# Patient Record
Sex: MALE | Race: WHITE | Employment: OTHER | URBAN - METROPOLITAN AREA
[De-identification: names, ages, dates, MRNs, and addresses within clinical notes are randomized per-mention and may not be internally consistent; named-entity substitution may affect disease eponyms.]

---

## 2018-03-24 RX ORDER — CIPROFLOXACIN 500 MG/1
500 TABLET, FILM COATED ORAL 2 TIMES DAILY
Qty: 20 TABLET | Refills: 0 | Status: SHIPPED | OUTPATIENT
Start: 2018-03-24 | End: 2018-04-03

## 2018-06-14 ENCOUNTER — HOSPITAL ENCOUNTER (OUTPATIENT)
Age: 78
Discharge: HOME | End: 2018-06-16
Payer: MEDICARE

## 2018-06-14 DIAGNOSIS — E11.9 WELL CONTROLLED TYPE 2 DIABETES MELLITUS (HCC): ICD-10-CM

## 2018-06-14 DIAGNOSIS — E78.01 FAMILIAL HYPERCHOLESTEROLEMIA: ICD-10-CM

## 2018-06-14 LAB
ALBUMIN SERPL-MCNC: 4.4 G/DL (ref 3.5–5.2)
ALP BLD-CCNC: 101 U/L (ref 40–129)
ALT SERPL-CCNC: 13 U/L (ref 0–40)
ANION GAP SERPL CALCULATED.3IONS-SCNC: 21 MMOL/L (ref 7–16)
AST SERPL-CCNC: 18 U/L (ref 0–39)
BILIRUB SERPL-MCNC: 0.7 MG/DL (ref 0–1.2)
BUN BLDV-MCNC: 19 MG/DL (ref 8–23)
CALCIUM SERPL-MCNC: 9.4 MG/DL (ref 8.6–10.2)
CHLORIDE BLD-SCNC: 98 MMOL/L (ref 98–107)
CHOLESTEROL, TOTAL: 170 MG/DL (ref 0–199)
CO2: 22 MMOL/L (ref 22–29)
CREAT SERPL-MCNC: 1.1 MG/DL (ref 0.7–1.2)
GFR AFRICAN AMERICAN: >60
GFR NON-AFRICAN AMERICAN: >60 ML/MIN/1.73
GLUCOSE BLD-MCNC: 123 MG/DL (ref 74–109)
HBA1C MFR BLD: 6.8 % (ref 4.8–5.9)
HDLC SERPL-MCNC: 38 MG/DL
LDL CHOLESTEROL CALCULATED: 95 MG/DL (ref 0–99)
POTASSIUM SERPL-SCNC: 4.7 MMOL/L (ref 3.5–5)
SODIUM BLD-SCNC: 141 MMOL/L (ref 132–146)
TOTAL PROTEIN: 7.1 G/DL (ref 6.4–8.3)
TRIGL SERPL-MCNC: 186 MG/DL (ref 0–149)
VLDLC SERPL CALC-MCNC: 37 MG/DL

## 2018-06-14 PROCEDURE — 83036 HEMOGLOBIN GLYCOSYLATED A1C: CPT

## 2018-06-14 PROCEDURE — 80061 LIPID PANEL: CPT

## 2018-06-14 PROCEDURE — 80053 COMPREHEN METABOLIC PANEL: CPT

## 2018-09-06 ENCOUNTER — HOSPITAL ENCOUNTER (OUTPATIENT)
Age: 78
Discharge: HOME | End: 2018-09-08
Payer: MEDICARE

## 2018-09-06 DIAGNOSIS — E11.9 TYPE 2 DIABETES MELLITUS WITHOUT COMPLICATION, WITHOUT LONG-TERM CURRENT USE OF INSULIN (HCC): ICD-10-CM

## 2018-09-06 DIAGNOSIS — E78.00 HYPERCHOLESTEROLEMIA: ICD-10-CM

## 2018-09-06 LAB
CHOLESTEROL, TOTAL: 173 MG/DL (ref 0–199)
HBA1C MFR BLD: 7 % (ref 4–5.6)
HDLC SERPL-MCNC: 42 MG/DL
LDL CHOLESTEROL CALCULATED: 94 MG/DL (ref 0–99)
TRIGL SERPL-MCNC: 183 MG/DL (ref 0–149)
VLDLC SERPL CALC-MCNC: 37 MG/DL

## 2018-09-06 PROCEDURE — 80061 LIPID PANEL: CPT

## 2018-09-06 PROCEDURE — 83036 HEMOGLOBIN GLYCOSYLATED A1C: CPT

## 2018-09-07 ENCOUNTER — HOSPITAL ENCOUNTER (OUTPATIENT)
Age: 78
Discharge: HOME | End: 2018-09-09
Payer: MEDICARE

## 2018-09-07 DIAGNOSIS — I10 ESSENTIAL HYPERTENSION: ICD-10-CM

## 2018-09-07 LAB
ALBUMIN SERPL-MCNC: 4.4 G/DL (ref 3.5–5.2)
ALP BLD-CCNC: 89 U/L (ref 40–129)
ALT SERPL-CCNC: 11 U/L (ref 0–40)
ANION GAP SERPL CALCULATED.3IONS-SCNC: 17 MMOL/L (ref 7–16)
AST SERPL-CCNC: 15 U/L (ref 0–39)
BILIRUB SERPL-MCNC: 0.6 MG/DL (ref 0–1.2)
BUN BLDV-MCNC: 24 MG/DL (ref 8–23)
CALCIUM SERPL-MCNC: 9.8 MG/DL (ref 8.6–10.2)
CHLORIDE BLD-SCNC: 102 MMOL/L (ref 98–107)
CO2: 22 MMOL/L (ref 22–29)
CREAT SERPL-MCNC: 1.2 MG/DL (ref 0.7–1.2)
GFR AFRICAN AMERICAN: >60
GFR NON-AFRICAN AMERICAN: 58 ML/MIN/1.73
GLUCOSE BLD-MCNC: 134 MG/DL (ref 74–109)
POTASSIUM SERPL-SCNC: 4.3 MMOL/L (ref 3.5–5)
SODIUM BLD-SCNC: 141 MMOL/L (ref 132–146)
TOTAL PROTEIN: 7.2 G/DL (ref 6.4–8.3)

## 2018-09-07 PROCEDURE — 80053 COMPREHEN METABOLIC PANEL: CPT

## 2018-12-06 ENCOUNTER — HOSPITAL ENCOUNTER (OUTPATIENT)
Age: 78
Discharge: HOME | End: 2018-12-06
Payer: MEDICARE

## 2018-12-06 DIAGNOSIS — E78.00 HYPERCHOLESTEROLEMIA: ICD-10-CM

## 2018-12-06 DIAGNOSIS — E11.9 TYPE 2 DIABETES MELLITUS WITHOUT COMPLICATION, WITHOUT LONG-TERM CURRENT USE OF INSULIN (HCC): ICD-10-CM

## 2018-12-06 LAB
ALBUMIN SERPL-MCNC: 4.4 G/DL (ref 3.5–5.2)
ALP BLD-CCNC: 94 U/L (ref 40–129)
ALT SERPL-CCNC: 16 U/L (ref 0–40)
ANION GAP SERPL CALCULATED.3IONS-SCNC: 13 MMOL/L (ref 7–16)
AST SERPL-CCNC: 12 U/L (ref 0–39)
BILIRUB SERPL-MCNC: 0.5 MG/DL (ref 0–1.2)
BUN BLDV-MCNC: 22 MG/DL (ref 8–23)
CALCIUM SERPL-MCNC: 9.6 MG/DL (ref 8.6–10.2)
CHLORIDE BLD-SCNC: 102 MMOL/L (ref 98–107)
CHOLESTEROL, TOTAL: 167 MG/DL (ref 0–199)
CO2: 27 MMOL/L (ref 22–29)
CREAT SERPL-MCNC: 1.2 MG/DL (ref 0.7–1.2)
GFR AFRICAN AMERICAN: >60
GFR NON-AFRICAN AMERICAN: 58 ML/MIN/1.73
GLUCOSE BLD-MCNC: 149 MG/DL (ref 74–99)
HBA1C MFR BLD: 7 % (ref 4–5.6)
HDLC SERPL-MCNC: 42 MG/DL
LDL CHOLESTEROL CALCULATED: 92 MG/DL (ref 0–99)
POTASSIUM SERPL-SCNC: 4.5 MMOL/L (ref 3.5–5)
SODIUM BLD-SCNC: 142 MMOL/L (ref 132–146)
TOTAL PROTEIN: 7.3 G/DL (ref 6.4–8.3)
TRIGL SERPL-MCNC: 163 MG/DL (ref 0–149)
VLDLC SERPL CALC-MCNC: 33 MG/DL

## 2018-12-06 PROCEDURE — 36415 COLL VENOUS BLD VENIPUNCTURE: CPT

## 2018-12-06 PROCEDURE — 80061 LIPID PANEL: CPT

## 2018-12-06 PROCEDURE — 83036 HEMOGLOBIN GLYCOSYLATED A1C: CPT

## 2018-12-06 PROCEDURE — 80053 COMPREHEN METABOLIC PANEL: CPT

## 2019-03-07 ENCOUNTER — HOSPITAL ENCOUNTER (OUTPATIENT)
Age: 79
Discharge: HOME | End: 2019-03-07
Payer: MEDICARE

## 2019-03-07 DIAGNOSIS — I10 ESSENTIAL HYPERTENSION: ICD-10-CM

## 2019-03-07 DIAGNOSIS — E11.9 TYPE 2 DIABETES MELLITUS WITHOUT COMPLICATION, WITHOUT LONG-TERM CURRENT USE OF INSULIN (HCC): ICD-10-CM

## 2019-03-07 DIAGNOSIS — E78.00 HYPERCHOLESTEROLEMIA: ICD-10-CM

## 2019-03-07 LAB
ALBUMIN SERPL-MCNC: 4.5 G/DL (ref 3.5–5.2)
ALP BLD-CCNC: 93 U/L (ref 40–129)
ALT SERPL-CCNC: 11 U/L (ref 0–40)
ANION GAP SERPL CALCULATED.3IONS-SCNC: 12 MMOL/L (ref 7–16)
AST SERPL-CCNC: 10 U/L (ref 0–39)
BASOPHILS ABSOLUTE: 0.06 E9/L (ref 0–0.2)
BASOPHILS RELATIVE PERCENT: 1.1 % (ref 0–2)
BILIRUB SERPL-MCNC: 0.7 MG/DL (ref 0–1.2)
BUN BLDV-MCNC: 24 MG/DL (ref 8–23)
CALCIUM SERPL-MCNC: 9.9 MG/DL (ref 8.6–10.2)
CHLORIDE BLD-SCNC: 103 MMOL/L (ref 98–107)
CHOLESTEROL, TOTAL: 175 MG/DL (ref 0–199)
CO2: 28 MMOL/L (ref 22–29)
CREAT SERPL-MCNC: 1.1 MG/DL (ref 0.7–1.2)
EOSINOPHILS ABSOLUTE: 0.24 E9/L (ref 0.05–0.5)
EOSINOPHILS RELATIVE PERCENT: 4.3 % (ref 0–6)
GFR AFRICAN AMERICAN: >60
GFR NON-AFRICAN AMERICAN: >60 ML/MIN/1.73
GLUCOSE BLD-MCNC: 169 MG/DL (ref 74–99)
HBA1C MFR BLD: 6.9 % (ref 4–5.6)
HCT VFR BLD CALC: 45.1 % (ref 37–54)
HDLC SERPL-MCNC: 39 MG/DL
HEMOGLOBIN: 15 G/DL (ref 12.5–16.5)
IMMATURE GRANULOCYTES #: 0.03 E9/L
IMMATURE GRANULOCYTES %: 0.5 % (ref 0–5)
LDL CHOLESTEROL CALCULATED: 92 MG/DL (ref 0–99)
LYMPHOCYTES ABSOLUTE: 1.37 E9/L (ref 1.5–4)
LYMPHOCYTES RELATIVE PERCENT: 24.4 % (ref 20–42)
MCH RBC QN AUTO: 30.6 PG (ref 26–35)
MCHC RBC AUTO-ENTMCNC: 33.3 % (ref 32–34.5)
MCV RBC AUTO: 92 FL (ref 80–99.9)
MONOCYTES ABSOLUTE: 0.5 E9/L (ref 0.1–0.95)
MONOCYTES RELATIVE PERCENT: 8.9 % (ref 2–12)
NEUTROPHILS ABSOLUTE: 3.42 E9/L (ref 1.8–7.3)
NEUTROPHILS RELATIVE PERCENT: 60.8 % (ref 43–80)
PDW BLD-RTO: 13.2 FL (ref 11.5–15)
PLATELET # BLD: 174 E9/L (ref 130–450)
PMV BLD AUTO: 10.7 FL (ref 7–12)
POTASSIUM SERPL-SCNC: 4.5 MMOL/L (ref 3.5–5)
RBC # BLD: 4.9 E12/L (ref 3.8–5.8)
SODIUM BLD-SCNC: 143 MMOL/L (ref 132–146)
TOTAL PROTEIN: 7.5 G/DL (ref 6.4–8.3)
TRIGL SERPL-MCNC: 221 MG/DL (ref 0–149)
VLDLC SERPL CALC-MCNC: 44 MG/DL
WBC # BLD: 5.6 E9/L (ref 4.5–11.5)

## 2019-03-07 PROCEDURE — 80053 COMPREHEN METABOLIC PANEL: CPT

## 2019-03-07 PROCEDURE — 85025 COMPLETE CBC W/AUTO DIFF WBC: CPT

## 2019-03-07 PROCEDURE — 83036 HEMOGLOBIN GLYCOSYLATED A1C: CPT

## 2019-03-07 PROCEDURE — 80061 LIPID PANEL: CPT

## 2019-03-07 PROCEDURE — 36415 COLL VENOUS BLD VENIPUNCTURE: CPT

## 2019-03-28 ENCOUNTER — HOSPITAL ENCOUNTER (OUTPATIENT)
Age: 79
Discharge: HOME | End: 2019-03-28
Payer: MEDICARE

## 2019-03-28 LAB
C-REACTIVE PROTEIN: 0.1 MG/DL (ref 0–0.4)
HCT VFR BLD CALC: 45.2 % (ref 37–54)
HEMOGLOBIN: 14.9 G/DL (ref 12.5–16.5)
MCH RBC QN AUTO: 30.6 PG (ref 26–35)
MCHC RBC AUTO-ENTMCNC: 33 % (ref 32–34.5)
MCV RBC AUTO: 92.8 FL (ref 80–99.9)
PDW BLD-RTO: 13.2 FL (ref 11.5–15)
PLATELET # BLD: 185 E9/L (ref 130–450)
PMV BLD AUTO: 10.6 FL (ref 7–12)
RBC # BLD: 4.87 E12/L (ref 3.8–5.8)
SEDIMENTATION RATE, ERYTHROCYTE: 6 MM/HR (ref 0–15)
WBC # BLD: 6.4 E9/L (ref 4.5–11.5)

## 2019-03-28 PROCEDURE — 36415 COLL VENOUS BLD VENIPUNCTURE: CPT

## 2019-03-28 PROCEDURE — 86140 C-REACTIVE PROTEIN: CPT

## 2019-03-28 PROCEDURE — 85027 COMPLETE CBC AUTOMATED: CPT

## 2019-03-28 PROCEDURE — 85651 RBC SED RATE NONAUTOMATED: CPT

## 2019-10-08 ENCOUNTER — HOSPITAL ENCOUNTER (OUTPATIENT)
Age: 79
Discharge: HOME | End: 2019-10-10
Payer: MEDICARE

## 2019-10-08 LAB — PROSTATE SPECIFIC ANTIGEN: 0.83 NG/ML (ref 0–4)

## 2019-10-08 PROCEDURE — G0103 PSA SCREENING: HCPCS

## 2020-01-01 ENCOUNTER — HOSPITAL ENCOUNTER (OUTPATIENT)
Age: 80
Setting detail: OBSERVATION
Discharge: HOME | End: 2020-12-23
Attending: EMERGENCY MEDICINE | Admitting: INTERNAL MEDICINE
Payer: MEDICARE

## 2020-01-01 ENCOUNTER — APPOINTMENT (OUTPATIENT)
Dept: GENERAL RADIOLOGY | Age: 80
End: 2020-01-01
Payer: MEDICARE

## 2020-01-01 ENCOUNTER — HOSPITAL ENCOUNTER (EMERGENCY)
Age: 80
Discharge: HOME | End: 2020-11-11
Payer: MEDICARE

## 2020-01-01 ENCOUNTER — APPOINTMENT (OUTPATIENT)
Dept: CT IMAGING | Age: 80
End: 2020-01-01
Payer: MEDICARE

## 2020-01-01 ENCOUNTER — TELEPHONE (OUTPATIENT)
Dept: CARDIOLOGY CLINIC | Age: 80
End: 2020-01-01

## 2020-01-01 VITALS
DIASTOLIC BLOOD PRESSURE: 70 MMHG | TEMPERATURE: 97.7 F | OXYGEN SATURATION: 93 % | RESPIRATION RATE: 18 BRPM | HEIGHT: 69 IN | BODY MASS INDEX: 33.77 KG/M2 | HEART RATE: 68 BPM | SYSTOLIC BLOOD PRESSURE: 140 MMHG | WEIGHT: 228 LBS

## 2020-01-01 VITALS
RESPIRATION RATE: 16 BRPM | WEIGHT: 241.8 LBS | DIASTOLIC BLOOD PRESSURE: 68 MMHG | TEMPERATURE: 97.7 F | BODY MASS INDEX: 35.81 KG/M2 | HEIGHT: 69 IN | HEART RATE: 74 BPM | OXYGEN SATURATION: 96 % | SYSTOLIC BLOOD PRESSURE: 158 MMHG

## 2020-01-01 DIAGNOSIS — E11.9 TYPE 2 DIABETES MELLITUS WITHOUT COMPLICATION, WITHOUT LONG-TERM CURRENT USE OF INSULIN (HCC): ICD-10-CM

## 2020-01-01 DIAGNOSIS — E78.00 HYPERCHOLESTEROLEMIA: ICD-10-CM

## 2020-01-01 LAB
ALBUMIN SERPL-MCNC: 3.6 G/DL (ref 3.5–5.2)
ALBUMIN SERPL-MCNC: 3.9 G/DL (ref 3.5–5.2)
ALBUMIN SERPL-MCNC: 4.2 G/DL (ref 3.5–5.2)
ALBUMIN SERPL-MCNC: 4.2 G/DL (ref 3.5–5.2)
ALP BLD-CCNC: 77 U/L (ref 40–129)
ALP BLD-CCNC: 82 U/L (ref 40–129)
ALP BLD-CCNC: 88 U/L (ref 40–129)
ALP BLD-CCNC: 93 U/L (ref 40–129)
ALT SERPL-CCNC: 10 U/L (ref 0–40)
ALT SERPL-CCNC: 10 U/L (ref 0–40)
ALT SERPL-CCNC: 12 U/L (ref 0–40)
ALT SERPL-CCNC: 14 U/L (ref 0–40)
ANION GAP SERPL CALCULATED.3IONS-SCNC: 11 MMOL/L (ref 7–16)
ANION GAP SERPL CALCULATED.3IONS-SCNC: 15 MMOL/L (ref 7–16)
ANION GAP SERPL CALCULATED.3IONS-SCNC: 16 MMOL/L (ref 7–16)
ANION GAP SERPL CALCULATED.3IONS-SCNC: 9 MMOL/L (ref 7–16)
ANION GAP SERPL CALCULATED.3IONS-SCNC: 9 MMOL/L (ref 7–16)
AST SERPL-CCNC: 13 U/L (ref 0–39)
AST SERPL-CCNC: 14 U/L (ref 0–39)
AST SERPL-CCNC: 14 U/L (ref 0–39)
AST SERPL-CCNC: 25 U/L (ref 0–39)
BACTERIA: ABNORMAL /HPF
BACTERIA: ABNORMAL /HPF
BASOPHILS ABSOLUTE: 0.02 E9/L (ref 0–0.2)
BASOPHILS ABSOLUTE: 0.03 E9/L (ref 0–0.2)
BASOPHILS ABSOLUTE: 0.04 E9/L (ref 0–0.2)
BASOPHILS RELATIVE PERCENT: 0.2 % (ref 0–2)
BASOPHILS RELATIVE PERCENT: 0.3 % (ref 0–2)
BASOPHILS RELATIVE PERCENT: 0.7 % (ref 0–2)
BILIRUB SERPL-MCNC: 0.5 MG/DL (ref 0–1.2)
BILIRUB SERPL-MCNC: 0.5 MG/DL (ref 0–1.2)
BILIRUB SERPL-MCNC: 0.6 MG/DL (ref 0–1.2)
BILIRUB SERPL-MCNC: 0.7 MG/DL (ref 0–1.2)
BILIRUBIN URINE: NEGATIVE
BILIRUBIN URINE: NEGATIVE
BLOOD, URINE: ABNORMAL
BLOOD, URINE: ABNORMAL
BUN BLDV-MCNC: 13 MG/DL (ref 8–23)
BUN BLDV-MCNC: 16 MG/DL (ref 8–23)
BUN BLDV-MCNC: 16 MG/DL (ref 8–23)
BUN BLDV-MCNC: 17 MG/DL (ref 8–23)
BUN BLDV-MCNC: 18 MG/DL (ref 8–23)
C DIFF TOXIN/ANTIGEN: NORMAL
CALCIUM SERPL-MCNC: 9 MG/DL (ref 8.6–10.2)
CALCIUM SERPL-MCNC: 9.1 MG/DL (ref 8.6–10.2)
CALCIUM SERPL-MCNC: 9.4 MG/DL (ref 8.6–10.2)
CALCIUM SERPL-MCNC: 9.5 MG/DL (ref 8.6–10.2)
CALCIUM SERPL-MCNC: 9.6 MG/DL (ref 8.6–10.2)
CHLORIDE BLD-SCNC: 100 MMOL/L (ref 98–107)
CHLORIDE BLD-SCNC: 101 MMOL/L (ref 98–107)
CHLORIDE BLD-SCNC: 101 MMOL/L (ref 98–107)
CHLORIDE BLD-SCNC: 103 MMOL/L (ref 98–107)
CHLORIDE BLD-SCNC: 99 MMOL/L (ref 98–107)
CHOLESTEROL, TOTAL: 147 MG/DL (ref 0–199)
CLARITY: CLEAR
CLARITY: CLEAR
CO2: 22 MMOL/L (ref 22–29)
CO2: 24 MMOL/L (ref 22–29)
CO2: 25 MMOL/L (ref 22–29)
CO2: 25 MMOL/L (ref 22–29)
CO2: 27 MMOL/L (ref 22–29)
COLOR: YELLOW
COLOR: YELLOW
CREAT SERPL-MCNC: 1 MG/DL (ref 0.7–1.2)
CREAT SERPL-MCNC: 1.1 MG/DL (ref 0.7–1.2)
CREAT SERPL-MCNC: 1.2 MG/DL (ref 0.7–1.2)
CREAT SERPL-MCNC: 1.3 MG/DL (ref 0.7–1.2)
CREAT SERPL-MCNC: 1.4 MG/DL (ref 0.7–1.2)
EKG ATRIAL RATE: 60 BPM
EKG P AXIS: 88 DEGREES
EKG P-R INTERVAL: 160 MS
EKG Q-T INTERVAL: 414 MS
EKG QRS DURATION: 154 MS
EKG QTC CALCULATION (BAZETT): 423 MS
EKG R AXIS: -39 DEGREES
EKG T AXIS: -2 DEGREES
EKG VENTRICULAR RATE: 63 BPM
EOSINOPHILS ABSOLUTE: 0 E9/L (ref 0.05–0.5)
EOSINOPHILS ABSOLUTE: 0.01 E9/L (ref 0.05–0.5)
EOSINOPHILS ABSOLUTE: 0.15 E9/L (ref 0.05–0.5)
EOSINOPHILS RELATIVE PERCENT: 0 % (ref 0–6)
EOSINOPHILS RELATIVE PERCENT: 0.1 % (ref 0–6)
EOSINOPHILS RELATIVE PERCENT: 2.5 % (ref 0–6)
EPITHELIAL CELLS, UA: ABNORMAL /HPF
GFR AFRICAN AMERICAN: 59
GFR AFRICAN AMERICAN: >60
GFR NON-AFRICAN AMERICAN: 49 ML/MIN/1.73
GFR NON-AFRICAN AMERICAN: 53 ML/MIN/1.73
GFR NON-AFRICAN AMERICAN: 58 ML/MIN/1.73
GFR NON-AFRICAN AMERICAN: >60 ML/MIN/1.73
GFR NON-AFRICAN AMERICAN: >60 ML/MIN/1.73
GLUCOSE BLD-MCNC: 121 MG/DL (ref 74–99)
GLUCOSE BLD-MCNC: 146 MG/DL (ref 74–99)
GLUCOSE BLD-MCNC: 151 MG/DL (ref 74–99)
GLUCOSE BLD-MCNC: 160 MG/DL (ref 74–99)
GLUCOSE BLD-MCNC: 168 MG/DL (ref 74–99)
GLUCOSE URINE: 500 MG/DL
GLUCOSE URINE: >=1000 MG/DL
HBA1C MFR BLD: 7 % (ref 4–5.6)
HCT VFR BLD CALC: 40 % (ref 37–54)
HCT VFR BLD CALC: 40.4 % (ref 37–54)
HCT VFR BLD CALC: 42.9 % (ref 37–54)
HDLC SERPL-MCNC: 37 MG/DL
HEMOGLOBIN: 13.2 G/DL (ref 12.5–16.5)
HEMOGLOBIN: 13.5 G/DL (ref 12.5–16.5)
HEMOGLOBIN: 14.9 G/DL (ref 12.5–16.5)
IMMATURE GRANULOCYTES #: 0.01 E9/L
IMMATURE GRANULOCYTES #: 0.04 E9/L
IMMATURE GRANULOCYTES #: 0.05 E9/L
IMMATURE GRANULOCYTES %: 0.2 % (ref 0–5)
IMMATURE GRANULOCYTES %: 0.4 % (ref 0–5)
IMMATURE GRANULOCYTES %: 0.5 % (ref 0–5)
KETONES, URINE: 15 MG/DL
KETONES, URINE: ABNORMAL MG/DL
LACTIC ACID: 1.5 MMOL/L (ref 0.5–2.2)
LACTIC ACID: 2 MMOL/L (ref 0.5–2.2)
LDL CHOLESTEROL CALCULATED: 75 MG/DL (ref 0–99)
LEUKOCYTE ESTERASE, URINE: NEGATIVE
LEUKOCYTE ESTERASE, URINE: NEGATIVE
LIPASE: 14 U/L (ref 13–60)
LV EF: 63 %
LVEF MODALITY: NORMAL
LYMPHOCYTES ABSOLUTE: 0.7 E9/L (ref 1.5–4)
LYMPHOCYTES ABSOLUTE: 0.75 E9/L (ref 1.5–4)
LYMPHOCYTES ABSOLUTE: 1.51 E9/L (ref 1.5–4)
LYMPHOCYTES RELATIVE PERCENT: 25 % (ref 20–42)
LYMPHOCYTES RELATIVE PERCENT: 7 % (ref 20–42)
LYMPHOCYTES RELATIVE PERCENT: 7.8 % (ref 20–42)
MAGNESIUM: 1.7 MG/DL (ref 1.6–2.6)
MCH RBC QN AUTO: 31.4 PG (ref 26–35)
MCH RBC QN AUTO: 32 PG (ref 26–35)
MCH RBC QN AUTO: 32.6 PG (ref 26–35)
MCHC RBC AUTO-ENTMCNC: 32.7 % (ref 32–34.5)
MCHC RBC AUTO-ENTMCNC: 33.8 % (ref 32–34.5)
MCHC RBC AUTO-ENTMCNC: 34.7 % (ref 32–34.5)
MCV RBC AUTO: 93.9 FL (ref 80–99.9)
MCV RBC AUTO: 94.8 FL (ref 80–99.9)
MCV RBC AUTO: 96.2 FL (ref 80–99.9)
METER GLUCOSE: 121 MG/DL (ref 74–99)
METER GLUCOSE: 122 MG/DL (ref 74–99)
METER GLUCOSE: 126 MG/DL (ref 74–99)
METER GLUCOSE: 157 MG/DL (ref 74–99)
METER GLUCOSE: 163 MG/DL (ref 74–99)
METER GLUCOSE: 192 MG/DL (ref 74–99)
MONOCYTES ABSOLUTE: 0.52 E9/L (ref 0.1–0.95)
MONOCYTES ABSOLUTE: 0.6 E9/L (ref 0.1–0.95)
MONOCYTES ABSOLUTE: 0.62 E9/L (ref 0.1–0.95)
MONOCYTES RELATIVE PERCENT: 5.4 % (ref 2–12)
MONOCYTES RELATIVE PERCENT: 6.2 % (ref 2–12)
MONOCYTES RELATIVE PERCENT: 9.9 % (ref 2–12)
NEUTROPHILS ABSOLUTE: 3.74 E9/L (ref 1.8–7.3)
NEUTROPHILS ABSOLUTE: 8.28 E9/L (ref 1.8–7.3)
NEUTROPHILS ABSOLUTE: 8.58 E9/L (ref 1.8–7.3)
NEUTROPHILS RELATIVE PERCENT: 61.7 % (ref 43–80)
NEUTROPHILS RELATIVE PERCENT: 85.9 % (ref 43–80)
NEUTROPHILS RELATIVE PERCENT: 86.2 % (ref 43–80)
NITRITE, URINE: NEGATIVE
NITRITE, URINE: NEGATIVE
PDW BLD-RTO: 13.3 FL (ref 11.5–15)
PDW BLD-RTO: 14.1 FL (ref 11.5–15)
PDW BLD-RTO: 14.2 FL (ref 11.5–15)
PH UA: 5 (ref 5–9)
PH UA: 5 (ref 5–9)
PLATELET # BLD: 154 E9/L (ref 130–450)
PLATELET # BLD: 158 E9/L (ref 130–450)
PLATELET # BLD: 174 E9/L (ref 130–450)
PMV BLD AUTO: 10.2 FL (ref 7–12)
PMV BLD AUTO: 10.6 FL (ref 7–12)
PMV BLD AUTO: 10.9 FL (ref 7–12)
POTASSIUM REFLEX MAGNESIUM: 4.2 MMOL/L (ref 3.5–5)
POTASSIUM SERPL-SCNC: 3.8 MMOL/L (ref 3.5–5)
POTASSIUM SERPL-SCNC: 4 MMOL/L (ref 3.5–5)
PRO-BNP: 849 PG/ML (ref 0–450)
PROTEIN UA: ABNORMAL MG/DL
PROTEIN UA: NEGATIVE MG/DL
RBC # BLD: 4.2 E12/L (ref 3.8–5.8)
RBC # BLD: 4.22 E12/L (ref 3.8–5.8)
RBC # BLD: 4.57 E12/L (ref 3.8–5.8)
RBC UA: ABNORMAL /HPF (ref 0–2)
RBC UA: ABNORMAL /HPF (ref 0–2)
SARS-COV-2, NAAT: NOT DETECTED
SODIUM BLD-SCNC: 133 MMOL/L (ref 132–146)
SODIUM BLD-SCNC: 135 MMOL/L (ref 132–146)
SODIUM BLD-SCNC: 138 MMOL/L (ref 132–146)
SODIUM BLD-SCNC: 140 MMOL/L (ref 132–146)
SODIUM BLD-SCNC: 141 MMOL/L (ref 132–146)
SPECIFIC GRAVITY UA: 1.01 (ref 1–1.03)
SPECIFIC GRAVITY UA: >=1.03 (ref 1–1.03)
TOTAL PROTEIN: 6.8 G/DL (ref 6.4–8.3)
TOTAL PROTEIN: 7 G/DL (ref 6.4–8.3)
TOTAL PROTEIN: 7.1 G/DL (ref 6.4–8.3)
TOTAL PROTEIN: 7.3 G/DL (ref 6.4–8.3)
TRIGL SERPL-MCNC: 173 MG/DL (ref 0–149)
TROPONIN: 0.03 NG/ML (ref 0–0.03)
TROPONIN: 0.03 NG/ML (ref 0–0.03)
URINE CULTURE, ROUTINE: NORMAL
UROBILINOGEN, URINE: 0.2 E.U./DL
UROBILINOGEN, URINE: 0.2 E.U./DL
VLDLC SERPL CALC-MCNC: 35 MG/DL
WBC # BLD: 10 E9/L (ref 4.5–11.5)
WBC # BLD: 6.1 E9/L (ref 4.5–11.5)
WBC # BLD: 9.6 E9/L (ref 4.5–11.5)
WBC UA: ABNORMAL /HPF (ref 0–5)
WBC UA: ABNORMAL /HPF (ref 0–5)

## 2020-01-01 PROCEDURE — 96372 THER/PROPH/DIAG INJ SC/IM: CPT

## 2020-01-01 PROCEDURE — 87324 CLOSTRIDIUM AG IA: CPT

## 2020-01-01 PROCEDURE — 80053 COMPREHEN METABOLIC PANEL: CPT

## 2020-01-01 PROCEDURE — 83880 ASSAY OF NATRIURETIC PEPTIDE: CPT

## 2020-01-01 PROCEDURE — 97161 PT EVAL LOW COMPLEX 20 MIN: CPT

## 2020-01-01 PROCEDURE — 99285 EMERGENCY DEPT VISIT HI MDM: CPT

## 2020-01-01 PROCEDURE — 87449 NOS EACH ORGANISM AG IA: CPT

## 2020-01-01 PROCEDURE — 83605 ASSAY OF LACTIC ACID: CPT

## 2020-01-01 PROCEDURE — 6370000000 HC RX 637 (ALT 250 FOR IP): Performed by: INTERNAL MEDICINE

## 2020-01-01 PROCEDURE — 2580000003 HC RX 258: Performed by: INTERNAL MEDICINE

## 2020-01-01 PROCEDURE — G0378 HOSPITAL OBSERVATION PER HR: HCPCS

## 2020-01-01 PROCEDURE — 84484 ASSAY OF TROPONIN QUANT: CPT

## 2020-01-01 PROCEDURE — 96360 HYDRATION IV INFUSION INIT: CPT

## 2020-01-01 PROCEDURE — 74177 CT ABD & PELVIS W/CONTRAST: CPT

## 2020-01-01 PROCEDURE — 97165 OT EVAL LOW COMPLEX 30 MIN: CPT

## 2020-01-01 PROCEDURE — 93005 ELECTROCARDIOGRAM TRACING: CPT | Performed by: PHYSICIAN ASSISTANT

## 2020-01-01 PROCEDURE — 6360000004 HC RX CONTRAST MEDICATION: Performed by: PHYSICIAN ASSISTANT

## 2020-01-01 PROCEDURE — 82962 GLUCOSE BLOOD TEST: CPT

## 2020-01-01 PROCEDURE — 85025 COMPLETE CBC W/AUTO DIFF WBC: CPT

## 2020-01-01 PROCEDURE — U0002 COVID-19 LAB TEST NON-CDC: HCPCS

## 2020-01-01 PROCEDURE — 2580000003 HC RX 258: Performed by: EMERGENCY MEDICINE

## 2020-01-01 PROCEDURE — 81001 URINALYSIS AUTO W/SCOPE: CPT

## 2020-01-01 PROCEDURE — 6360000002 HC RX W HCPCS: Performed by: INTERNAL MEDICINE

## 2020-01-01 PROCEDURE — 99204 OFFICE O/P NEW MOD 45 MIN: CPT | Performed by: INTERNAL MEDICINE

## 2020-01-01 PROCEDURE — 93010 ELECTROCARDIOGRAM REPORT: CPT | Performed by: INTERNAL MEDICINE

## 2020-01-01 PROCEDURE — 83735 ASSAY OF MAGNESIUM: CPT

## 2020-01-01 PROCEDURE — 36415 COLL VENOUS BLD VENIPUNCTURE: CPT

## 2020-01-01 PROCEDURE — 6370000000 HC RX 637 (ALT 250 FOR IP): Performed by: PHYSICIAN ASSISTANT

## 2020-01-01 PROCEDURE — 93306 TTE W/DOPPLER COMPLETE: CPT

## 2020-01-01 PROCEDURE — 99283 EMERGENCY DEPT VISIT LOW MDM: CPT

## 2020-01-01 PROCEDURE — 2580000003 HC RX 258: Performed by: PHYSICIAN ASSISTANT

## 2020-01-01 PROCEDURE — APPSS60 APP SPLIT SHARED TIME 46-60 MINUTES: Performed by: PHYSICIAN ASSISTANT

## 2020-01-01 PROCEDURE — 71045 X-RAY EXAM CHEST 1 VIEW: CPT

## 2020-01-01 PROCEDURE — 83690 ASSAY OF LIPASE: CPT

## 2020-01-01 PROCEDURE — 87088 URINE BACTERIA CULTURE: CPT

## 2020-01-01 PROCEDURE — 6360000004 HC RX CONTRAST MEDICATION: Performed by: RADIOLOGY

## 2020-01-01 RX ORDER — METRONIDAZOLE 500 MG/1
500 TABLET ORAL ONCE
Status: COMPLETED | OUTPATIENT
Start: 2020-01-01 | End: 2020-01-01

## 2020-01-01 RX ORDER — ACETAMINOPHEN 325 MG/1
650 TABLET ORAL EVERY 6 HOURS PRN
Status: DISCONTINUED | OUTPATIENT
Start: 2020-01-01 | End: 2020-01-01 | Stop reason: HOSPADM

## 2020-01-01 RX ORDER — AMLODIPINE BESYLATE 5 MG/1
5 TABLET ORAL DAILY
Status: DISCONTINUED | OUTPATIENT
Start: 2020-01-01 | End: 2020-01-01 | Stop reason: HOSPADM

## 2020-01-01 RX ORDER — ASPIRIN 81 MG/1
81 TABLET ORAL NIGHTLY
Status: DISCONTINUED | OUTPATIENT
Start: 2020-01-01 | End: 2020-01-01 | Stop reason: HOSPADM

## 2020-01-01 RX ORDER — LATANOPROST 50 UG/ML
1 SOLUTION/ DROPS OPHTHALMIC NIGHTLY
Status: DISCONTINUED | OUTPATIENT
Start: 2020-01-01 | End: 2020-01-01 | Stop reason: HOSPADM

## 2020-01-01 RX ORDER — ACETAMINOPHEN 650 MG/1
650 SUPPOSITORY RECTAL EVERY 6 HOURS PRN
Status: DISCONTINUED | OUTPATIENT
Start: 2020-01-01 | End: 2020-01-01 | Stop reason: HOSPADM

## 2020-01-01 RX ORDER — NICOTINE POLACRILEX 4 MG
15 LOZENGE BUCCAL PRN
Status: DISCONTINUED | OUTPATIENT
Start: 2020-01-01 | End: 2020-01-01 | Stop reason: HOSPADM

## 2020-01-01 RX ORDER — SODIUM CHLORIDE 0.9 % (FLUSH) 0.9 %
10 SYRINGE (ML) INJECTION PRN
Status: DISCONTINUED | OUTPATIENT
Start: 2020-01-01 | End: 2020-01-01 | Stop reason: HOSPADM

## 2020-01-01 RX ORDER — SODIUM CHLORIDE 9 MG/ML
INJECTION, SOLUTION INTRAVENOUS CONTINUOUS
Status: DISCONTINUED | OUTPATIENT
Start: 2020-01-01 | End: 2020-01-01 | Stop reason: HOSPADM

## 2020-01-01 RX ORDER — PANTOPRAZOLE SODIUM 40 MG/1
40 TABLET, DELAYED RELEASE ORAL
Status: DISCONTINUED | OUTPATIENT
Start: 2020-01-01 | End: 2020-01-01 | Stop reason: HOSPADM

## 2020-01-01 RX ORDER — FAMOTIDINE 20 MG/1
20 TABLET, FILM COATED ORAL 2 TIMES DAILY
Status: DISCONTINUED | OUTPATIENT
Start: 2020-01-01 | End: 2020-01-01

## 2020-01-01 RX ORDER — 0.9 % SODIUM CHLORIDE 0.9 %
1000 INTRAVENOUS SOLUTION INTRAVENOUS ONCE
Status: COMPLETED | OUTPATIENT
Start: 2020-01-01 | End: 2020-01-01

## 2020-01-01 RX ORDER — ATORVASTATIN CALCIUM 40 MG/1
40 TABLET, FILM COATED ORAL DAILY
Status: DISCONTINUED | OUTPATIENT
Start: 2020-01-01 | End: 2020-01-01 | Stop reason: HOSPADM

## 2020-01-01 RX ORDER — POTASSIUM CHLORIDE 20 MEQ/1
40 TABLET, EXTENDED RELEASE ORAL PRN
Status: DISCONTINUED | OUTPATIENT
Start: 2020-01-01 | End: 2020-01-01 | Stop reason: HOSPADM

## 2020-01-01 RX ORDER — OXYBUTYNIN CHLORIDE 15 MG/1
15 TABLET, EXTENDED RELEASE ORAL EVERY OTHER DAY
Status: DISCONTINUED | OUTPATIENT
Start: 2020-01-01 | End: 2020-01-01

## 2020-01-01 RX ORDER — DEXTROSE MONOHYDRATE 25 G/50ML
12.5 INJECTION, SOLUTION INTRAVENOUS PRN
Status: DISCONTINUED | OUTPATIENT
Start: 2020-01-01 | End: 2020-01-01 | Stop reason: HOSPADM

## 2020-01-01 RX ORDER — CEFDINIR 300 MG/1
300 CAPSULE ORAL ONCE
Status: COMPLETED | OUTPATIENT
Start: 2020-01-01 | End: 2020-01-01

## 2020-01-01 RX ORDER — SODIUM CHLORIDE 0.9 % (FLUSH) 0.9 %
10 SYRINGE (ML) INJECTION EVERY 12 HOURS SCHEDULED
Status: DISCONTINUED | OUTPATIENT
Start: 2020-01-01 | End: 2020-01-01 | Stop reason: HOSPADM

## 2020-01-01 RX ORDER — POTASSIUM CHLORIDE 7.45 MG/ML
10 INJECTION INTRAVENOUS PRN
Status: DISCONTINUED | OUTPATIENT
Start: 2020-01-01 | End: 2020-01-01 | Stop reason: HOSPADM

## 2020-01-01 RX ORDER — CEFDINIR 300 MG/1
300 CAPSULE ORAL 2 TIMES DAILY
Qty: 20 CAPSULE | Refills: 0 | Status: SHIPPED | OUTPATIENT
Start: 2020-01-01 | End: 2020-01-01

## 2020-01-01 RX ORDER — METRONIDAZOLE 500 MG/1
500 TABLET ORAL 3 TIMES DAILY
Qty: 30 TABLET | Refills: 0 | Status: SHIPPED | OUTPATIENT
Start: 2020-01-01 | End: 2020-01-01

## 2020-01-01 RX ORDER — DEXTROSE MONOHYDRATE 50 MG/ML
100 INJECTION, SOLUTION INTRAVENOUS PRN
Status: DISCONTINUED | OUTPATIENT
Start: 2020-01-01 | End: 2020-01-01 | Stop reason: HOSPADM

## 2020-01-01 RX ORDER — METOPROLOL TARTRATE 50 MG/1
50 TABLET, FILM COATED ORAL DAILY
Status: DISCONTINUED | OUTPATIENT
Start: 2020-01-01 | End: 2020-01-01 | Stop reason: HOSPADM

## 2020-01-01 RX ADMIN — PANTOPRAZOLE SODIUM 40 MG: 40 TABLET, DELAYED RELEASE ORAL at 05:51

## 2020-01-01 RX ADMIN — SODIUM CHLORIDE 1000 ML: 9 INJECTION, SOLUTION INTRAVENOUS at 19:08

## 2020-01-01 RX ADMIN — PERFLUTREN 1.65 MG: 6.52 INJECTION, SUSPENSION INTRAVENOUS at 14:57

## 2020-01-01 RX ADMIN — ENOXAPARIN SODIUM 40 MG: 40 INJECTION SUBCUTANEOUS at 07:34

## 2020-01-01 RX ADMIN — SODIUM CHLORIDE, PRESERVATIVE FREE 10 ML: 5 INJECTION INTRAVENOUS at 20:27

## 2020-01-01 RX ADMIN — METOPROLOL TARTRATE 50 MG: 50 TABLET, FILM COATED ORAL at 09:15

## 2020-01-01 RX ADMIN — AMLODIPINE BESYLATE 5 MG: 5 TABLET ORAL at 13:54

## 2020-01-01 RX ADMIN — INSULIN LISPRO 1 UNITS: 100 INJECTION, SOLUTION INTRAVENOUS; SUBCUTANEOUS at 10:52

## 2020-01-01 RX ADMIN — PANTOPRAZOLE SODIUM 40 MG: 40 TABLET, DELAYED RELEASE ORAL at 09:15

## 2020-01-01 RX ADMIN — SODIUM CHLORIDE 1000 ML: 9 INJECTION, SOLUTION INTRAVENOUS at 05:08

## 2020-01-01 RX ADMIN — ENOXAPARIN SODIUM 40 MG: 40 INJECTION SUBCUTANEOUS at 09:15

## 2020-01-01 RX ADMIN — CEFDINIR 300 MG: 300 CAPSULE ORAL at 21:09

## 2020-01-01 RX ADMIN — METRONIDAZOLE 500 MG: 500 TABLET ORAL at 21:09

## 2020-01-01 RX ADMIN — ATORVASTATIN CALCIUM 40 MG: 40 TABLET, FILM COATED ORAL at 20:27

## 2020-01-01 RX ADMIN — INSULIN LISPRO 1 UNITS: 100 INJECTION, SOLUTION INTRAVENOUS; SUBCUTANEOUS at 11:42

## 2020-01-01 RX ADMIN — SODIUM CHLORIDE: 9 INJECTION, SOLUTION INTRAVENOUS at 18:48

## 2020-01-01 RX ADMIN — IOPAMIDOL 100 ML: 755 INJECTION, SOLUTION INTRAVENOUS at 20:22

## 2020-01-01 RX ADMIN — ASPIRIN 81 MG: 81 TABLET, COATED ORAL at 20:27

## 2020-01-01 RX ADMIN — LATANOPROST 1 DROP: 50 SOLUTION OPHTHALMIC at 20:27

## 2020-01-01 RX ADMIN — SODIUM CHLORIDE, PRESERVATIVE FREE 10 ML: 5 INJECTION INTRAVENOUS at 09:15

## 2020-01-01 RX ADMIN — METOPROLOL TARTRATE 50 MG: 50 TABLET, FILM COATED ORAL at 07:34

## 2020-01-01 ASSESSMENT — PAIN SCALES - GENERAL
PAINLEVEL_OUTOF10: 0
PAINLEVEL_OUTOF10: 4
PAINLEVEL_OUTOF10: 0
PAINLEVEL_OUTOF10: 0

## 2020-01-01 ASSESSMENT — ENCOUNTER SYMPTOMS
FACIAL SWELLING: 0
COUGH: 0
WHEEZING: 0
PHOTOPHOBIA: 0
ABDOMINAL DISTENTION: 0
SHORTNESS OF BREATH: 0
SHORTNESS OF BREATH: 0
ABDOMINAL PAIN: 0
VOMITING: 0
BLOOD IN STOOL: 0
APNEA: 0
TROUBLE SWALLOWING: 0
RHINORRHEA: 0
NAUSEA: 0
NAUSEA: 0
VOMITING: 0
DIARRHEA: 0
COUGH: 0
BACK PAIN: 0
CONSTIPATION: 0
DIARRHEA: 1
CHEST TIGHTNESS: 0
COLOR CHANGE: 0

## 2020-01-01 ASSESSMENT — PAIN DESCRIPTION - ORIENTATION: ORIENTATION: RIGHT

## 2020-01-01 ASSESSMENT — PAIN DESCRIPTION - PAIN TYPE: TYPE: ACUTE PAIN

## 2020-01-01 ASSESSMENT — PAIN DESCRIPTION - DESCRIPTORS: DESCRIPTORS: DISCOMFORT

## 2020-01-01 ASSESSMENT — PAIN DESCRIPTION - LOCATION: LOCATION: FLANK

## 2020-01-20 ENCOUNTER — APPOINTMENT (OUTPATIENT)
Dept: CT IMAGING | Age: 80
End: 2020-01-20
Payer: MEDICARE

## 2020-01-20 ENCOUNTER — HOSPITAL ENCOUNTER (EMERGENCY)
Age: 80
Discharge: HOME | End: 2020-01-20
Attending: FAMILY MEDICINE
Payer: MEDICARE

## 2020-01-20 VITALS
OXYGEN SATURATION: 99 % | BODY MASS INDEX: 39.25 KG/M2 | HEIGHT: 69 IN | RESPIRATION RATE: 16 BRPM | WEIGHT: 265 LBS | SYSTOLIC BLOOD PRESSURE: 170 MMHG | TEMPERATURE: 97.5 F | HEART RATE: 60 BPM | DIASTOLIC BLOOD PRESSURE: 100 MMHG

## 2020-01-20 PROCEDURE — 72131 CT LUMBAR SPINE W/O DYE: CPT

## 2020-01-20 PROCEDURE — 99284 EMERGENCY DEPT VISIT MOD MDM: CPT

## 2020-01-20 PROCEDURE — 6370000000 HC RX 637 (ALT 250 FOR IP): Performed by: FAMILY MEDICINE

## 2020-01-20 PROCEDURE — 73700 CT LOWER EXTREMITY W/O DYE: CPT

## 2020-01-20 RX ORDER — IBUPROFEN 600 MG/1
600 TABLET ORAL ONCE
Status: COMPLETED | OUTPATIENT
Start: 2020-01-20 | End: 2020-01-20

## 2020-01-20 RX ADMIN — IBUPROFEN 600 MG: 600 TABLET, FILM COATED ORAL at 13:06

## 2020-01-20 ASSESSMENT — PAIN DESCRIPTION - ORIENTATION: ORIENTATION: LOWER

## 2020-01-20 ASSESSMENT — PAIN SCALES - GENERAL
PAINLEVEL_OUTOF10: 0
PAINLEVEL_OUTOF10: 0
PAINLEVEL_OUTOF10: 3
PAINLEVEL_OUTOF10: 5

## 2020-01-20 ASSESSMENT — PAIN DESCRIPTION - PAIN TYPE: TYPE: ACUTE PAIN

## 2020-01-20 ASSESSMENT — PAIN DESCRIPTION - DESCRIPTORS: DESCRIPTORS: ACHING

## 2020-01-20 ASSESSMENT — PAIN DESCRIPTION - LOCATION: LOCATION: BACK

## 2020-01-20 NOTE — ED PROVIDER NOTES
Department of Emergency Medicine   ED  Provider Note  Admit Date/RoomTime: 1/20/2020 11:38 AM  ED Room: 02/02 1/20/20  3:49 PM    History of Present Illness:   Chris Chowdary is a 78 y.o. male presenting to the ED for a fall yesterday. Patient reports he slipped on ice yesterday. Complaining of left hip pain. Patient denies hitting her head or any loss of consciousness. Patient also reporting lower back pain. Patient has no neurologic dysfunction. Patient reports taking Aleve yesterday which helped with his pain. Patient reports current pain is a 5 out of 10 aching in nature in his left hip and lower back. Review of Systems:   Pertinent positives and negatives are stated within HPI, all other systems reviewed and are negative.          --------------------------------------------- PAST HISTORY ---------------------------------------------  Past Medical History:  has a past medical history of Arthritis, BPH (benign prostatic hypertrophy), CAD (coronary artery disease), Cervical spondylosis, Constipation, CTS (carpal tunnel syndrome), Diabetes mellitus ([facility] [address] DM type 2 (diabetes mellitus, type 2) ([facility] [address] Exogenous obesity, Glaucoma, History of EKG, Hypercholesterolemia, Hypertension, OA (osteoarthritis), Partial blindness, Polyp of colon, PONV (postoperative nausea and vomiting), Preoperative clearance, Vitamin B12 deficiency, Vitamin D deficiency, and Wears glasses. Past Surgical History:  has a past surgical history that includes Diagnostic Cardiac Cath Lab Procedure (1990 approximately); eye surgery (Bilateral); Total shoulder arthroplasty (Left, 2000); Cataract removal with implant (Bilateral); Total knee arthroplasty (Right, 11/30/2015); Colonoscopy (10/31/2013); and joint replacement (Right, 12/08/2015). Social History:  reports that he quit smoking about 39 years ago. His smoking use included cigars. He started smoking about 66 years ago.  He has a 120.00 pack-year smoking history. He quit smokeless tobacco use about 4 years ago. He reports that he does not drink alcohol or use drugs. Family History: family history includes Heart Disease in his mother; Heart Disease (age of onset: 52) in his father. Unless otherwise noted, family history is non contributory    The patients home medications have been reviewed. Allergies: Ace inhibitors; Anesthetics, amide; and Vicodin [hydrocodone-acetaminophen]    -------------------------------------------------- RESULTS -------------------------------------------------  All laboratory and radiology results have been personally reviewed by myself   LABS:  No results found for this visit on 01/20/20. RADIOLOGY:  Interpreted by Radiologist.  [address]   Final Result   No evidence of acute skeletal trauma. The hip joint is in alignment   with mild degenerative change, and there is moderately advanced   tricompartmental degenerative changes of the knee with a small joint   effusion and small popliteal cyst.      No thigh soft tissue trauma is identified. CT LUMBAR SPINE WO CONTRAST   Final Result   Findings compatible with degenerative changes   L1 fracture          ------------------------- NURSING NOTES AND VITALS REVIEWED ---------------------------   The nursing notes within the ED encounter and vital signs as below have been reviewed.    BP (!) 170/100   Pulse 60   Temp 97.5 °F (36.4 °C) (Oral)   Resp 16   Ht 5' 9\" (1.753 m)   Wt 265 lb (120.2 kg)   SpO2 99%   BMI 39.13 kg/m²   Oxygen Saturation Interpretation: Normal      ---------------------------------------------------PHYSICAL EXAM--------------------------------------    Constitutional/General: Alert and oriented x3, well appearing, non toxic in NAD  Head: Normocephalic and atraumatic  Eyes: PERRL, EOMI, conjunctiva normal, sclera non icteric  Mouth: Oropharynx clear, handling secretions, no trismus, no asymmetry of the posterior oropharynx or uvular edema  Neck: Supple, full ROM, non tender to palpation in the midline, no stridor, no crepitus, no meningeal signs  Respiratory: Lungs clear to auscultation bilaterally, no wheezes, rales, or rhonchi. Not in respiratory distress  Cardiovascular:  Regular rate. Regular rhythm. No murmurs, gallops, or rubs. 2+ distal pulses  Chest: No chest wall tenderness  GI:  Abdomen Soft, Non tender, Non distended. +BS. No organomegaly, no palpable masses,  No rebound, guarding, or rigidity. Musculoskeletal: Moves all extremities x 4. Warm and well perfused, no clubbing, cyanosis, or edema. Capillary refill <3 seconds. No midline tenderness cervical, lumbar, thoracic spine. Patient has good range of motion of the left hip. Integument: skin warm and dry. No rashes. Lymphatic: no lymphadenopathy noted  Neurologic: GCS 15, no focal deficits, symmetric strength 5/5 in the upper and lower extremities bilaterally  Psychiatric: Normal Affect      ------------------------------ ED COURSE/MEDICAL DECISION MAKING----------------------  Medications   ibuprofen (ADVIL;MOTRIN) tablet 600 mg (600 mg Oral Given 1/20/20 1306)         Medical Decision Making:   Patient is hemodynamically stable. Patient's pain improved with the medication given here today. Patient reported he wanted to leave multiple times as he walked over to the main desk. Patient with findings of a fracture of L1. Patient with a transverse fracture. Informed patient and wife to use ibuprofen PRN for pain. Informed patient and wife and if any worsening symptoms go to the ER. Patient is currently neurologically intact. He has no neuro muscular dysfunction. He denies bowel bladder incontinence. He denies any saddle anesthesia. To follow-up with PCP in 2 to 3 days. Inform patient if any worsening symptoms go to the ER and he reports he understands. Counseling:    The emergency provider has spoken with the patient and discussed todays results, in addition to

## 2020-02-05 PROBLEM — E66.01 MORBIDLY OBESE (HCC): Status: ACTIVE | Noted: 2020-02-05

## 2020-02-05 PROBLEM — J45.20 ASTHMATIC BRONCHITIS, MILD INTERMITTENT, UNCOMPLICATED: Status: ACTIVE | Noted: 2020-02-05

## 2020-06-13 ENCOUNTER — HOSPITAL ENCOUNTER (OUTPATIENT)
Age: 80
Setting detail: OBSERVATION
Discharge: HOME | End: 2020-06-14
Attending: EMERGENCY MEDICINE | Admitting: INTERNAL MEDICINE
Payer: MEDICARE

## 2020-06-13 ENCOUNTER — APPOINTMENT (OUTPATIENT)
Dept: GENERAL RADIOLOGY | Age: 80
End: 2020-06-13
Payer: MEDICARE

## 2020-06-13 PROBLEM — R07.9 CHEST PAIN: Status: ACTIVE | Noted: 2020-06-13

## 2020-06-13 LAB
ALBUMIN SERPL-MCNC: 3.9 G/DL (ref 3.5–5.2)
ALP BLD-CCNC: 112 U/L (ref 40–129)
ALT SERPL-CCNC: 15 U/L (ref 0–40)
ANION GAP SERPL CALCULATED.3IONS-SCNC: 13 MMOL/L (ref 7–16)
AST SERPL-CCNC: 14 U/L (ref 0–39)
BASOPHILS ABSOLUTE: 0.03 E9/L (ref 0–0.2)
BASOPHILS RELATIVE PERCENT: 0.5 % (ref 0–2)
BILIRUB SERPL-MCNC: 0.4 MG/DL (ref 0–1.2)
BUN BLDV-MCNC: 21 MG/DL (ref 8–23)
CALCIUM SERPL-MCNC: 9.4 MG/DL (ref 8.6–10.2)
CHLORIDE BLD-SCNC: 102 MMOL/L (ref 98–107)
CO2: 26 MMOL/L (ref 22–29)
CREAT SERPL-MCNC: 1.3 MG/DL (ref 0.7–1.2)
EKG ATRIAL RATE: 55 BPM
EKG P AXIS: 78 DEGREES
EKG P-R INTERVAL: 188 MS
EKG Q-T INTERVAL: 460 MS
EKG QRS DURATION: 150 MS
EKG QTC CALCULATION (BAZETT): 440 MS
EKG R AXIS: -7 DEGREES
EKG T AXIS: 19 DEGREES
EKG VENTRICULAR RATE: 55 BPM
EOSINOPHILS ABSOLUTE: 0.38 E9/L (ref 0.05–0.5)
EOSINOPHILS RELATIVE PERCENT: 6 % (ref 0–6)
GFR AFRICAN AMERICAN: >60
GFR NON-AFRICAN AMERICAN: 53 ML/MIN/1.73
GLUCOSE BLD-MCNC: 196 MG/DL (ref 74–99)
HCT VFR BLD CALC: 40.5 % (ref 37–54)
HEMOGLOBIN: 13.3 G/DL (ref 12.5–16.5)
IMMATURE GRANULOCYTES #: 0.02 E9/L
IMMATURE GRANULOCYTES %: 0.3 % (ref 0–5)
LYMPHOCYTES ABSOLUTE: 1.64 E9/L (ref 1.5–4)
LYMPHOCYTES RELATIVE PERCENT: 25.8 % (ref 20–42)
MCH RBC QN AUTO: 31.1 PG (ref 26–35)
MCHC RBC AUTO-ENTMCNC: 32.8 % (ref 32–34.5)
MCV RBC AUTO: 94.8 FL (ref 80–99.9)
METER GLUCOSE: 114 MG/DL (ref 74–99)
METER GLUCOSE: 223 MG/DL (ref 74–99)
MONOCYTES ABSOLUTE: 0.7 E9/L (ref 0.1–0.95)
MONOCYTES RELATIVE PERCENT: 11 % (ref 2–12)
NEUTROPHILS ABSOLUTE: 3.58 E9/L (ref 1.8–7.3)
NEUTROPHILS RELATIVE PERCENT: 56.4 % (ref 43–80)
PDW BLD-RTO: 13.3 FL (ref 11.5–15)
PLATELET # BLD: 193 E9/L (ref 130–450)
PMV BLD AUTO: 10.5 FL (ref 7–12)
POTASSIUM SERPL-SCNC: 4.8 MMOL/L (ref 3.5–5)
PRO-BNP: 197 PG/ML (ref 0–450)
RBC # BLD: 4.27 E12/L (ref 3.8–5.8)
SODIUM BLD-SCNC: 141 MMOL/L (ref 132–146)
TOTAL PROTEIN: 7.1 G/DL (ref 6.4–8.3)
TROPONIN: <0.01 NG/ML (ref 0–0.03)
TROPONIN: <0.01 NG/ML (ref 0–0.03)
WBC # BLD: 6.4 E9/L (ref 4.5–11.5)

## 2020-06-13 PROCEDURE — 71045 X-RAY EXAM CHEST 1 VIEW: CPT

## 2020-06-13 PROCEDURE — 82962 GLUCOSE BLOOD TEST: CPT

## 2020-06-13 PROCEDURE — G0378 HOSPITAL OBSERVATION PER HR: HCPCS

## 2020-06-13 PROCEDURE — 84484 ASSAY OF TROPONIN QUANT: CPT

## 2020-06-13 PROCEDURE — 93005 ELECTROCARDIOGRAM TRACING: CPT | Performed by: EMERGENCY MEDICINE

## 2020-06-13 PROCEDURE — 6370000000 HC RX 637 (ALT 250 FOR IP): Performed by: EMERGENCY MEDICINE

## 2020-06-13 PROCEDURE — 99285 EMERGENCY DEPT VISIT HI MDM: CPT

## 2020-06-13 PROCEDURE — 36415 COLL VENOUS BLD VENIPUNCTURE: CPT

## 2020-06-13 PROCEDURE — 80053 COMPREHEN METABOLIC PANEL: CPT

## 2020-06-13 PROCEDURE — 85025 COMPLETE CBC W/AUTO DIFF WBC: CPT

## 2020-06-13 PROCEDURE — 83880 ASSAY OF NATRIURETIC PEPTIDE: CPT

## 2020-06-13 PROCEDURE — 6370000000 HC RX 637 (ALT 250 FOR IP): Performed by: INTERNAL MEDICINE

## 2020-06-13 RX ORDER — LOSARTAN POTASSIUM 25 MG/1
25 TABLET ORAL DAILY
Status: ON HOLD | COMMUNITY
End: 2020-06-14 | Stop reason: HOSPADM

## 2020-06-13 RX ORDER — ACETAMINOPHEN 325 MG/1
650 TABLET ORAL EVERY 4 HOURS PRN
Status: DISCONTINUED | OUTPATIENT
Start: 2020-06-13 | End: 2020-06-14 | Stop reason: HOSPADM

## 2020-06-13 RX ORDER — DIMENHYDRINATE 50 MG
100 TABLET ORAL NIGHTLY
Status: DISCONTINUED | OUTPATIENT
Start: 2020-06-13 | End: 2020-06-13 | Stop reason: CLARIF

## 2020-06-13 RX ORDER — ERGOCALCIFEROL 1.25 MG/1
50000 CAPSULE ORAL WEEKLY
Status: DISCONTINUED | OUTPATIENT
Start: 2020-06-13 | End: 2020-06-14 | Stop reason: HOSPADM

## 2020-06-13 RX ORDER — ASCORBIC ACID 500 MG
500 TABLET ORAL DAILY
Status: DISCONTINUED | OUTPATIENT
Start: 2020-06-13 | End: 2020-06-14 | Stop reason: HOSPADM

## 2020-06-13 RX ORDER — NICOTINE POLACRILEX 4 MG
15 LOZENGE BUCCAL PRN
Status: DISCONTINUED | OUTPATIENT
Start: 2020-06-13 | End: 2020-06-14 | Stop reason: HOSPADM

## 2020-06-13 RX ORDER — AMLODIPINE BESYLATE 5 MG/1
5 TABLET ORAL DAILY
Status: DISCONTINUED | OUTPATIENT
Start: 2020-06-13 | End: 2020-06-14 | Stop reason: HOSPADM

## 2020-06-13 RX ORDER — VITAMIN E 268 MG
400 CAPSULE ORAL DAILY
Status: DISCONTINUED | OUTPATIENT
Start: 2020-06-13 | End: 2020-06-14 | Stop reason: HOSPADM

## 2020-06-13 RX ORDER — LISINOPRIL 10 MG/1
1 TABLET ORAL DAILY
Status: DISCONTINUED | OUTPATIENT
Start: 2020-06-13 | End: 2020-06-14 | Stop reason: HOSPADM

## 2020-06-13 RX ORDER — VALSARTAN 80 MG/1
80 TABLET ORAL DAILY
Status: DISCONTINUED | OUTPATIENT
Start: 2020-06-13 | End: 2020-06-14 | Stop reason: HOSPADM

## 2020-06-13 RX ORDER — PANTOPRAZOLE SODIUM 40 MG/1
40 TABLET, DELAYED RELEASE ORAL
Status: DISCONTINUED | OUTPATIENT
Start: 2020-06-14 | End: 2020-06-14 | Stop reason: HOSPADM

## 2020-06-13 RX ORDER — LANOLIN ALCOHOL/MO/W.PET/CERES
1000 CREAM (GRAM) TOPICAL DAILY
Status: DISCONTINUED | OUTPATIENT
Start: 2020-06-13 | End: 2020-06-14 | Stop reason: HOSPADM

## 2020-06-13 RX ORDER — METOPROLOL TARTRATE 50 MG/1
50 TABLET, FILM COATED ORAL DAILY
Status: DISCONTINUED | OUTPATIENT
Start: 2020-06-13 | End: 2020-06-14 | Stop reason: HOSPADM

## 2020-06-13 RX ORDER — ASPIRIN 81 MG/1
81 TABLET ORAL NIGHTLY
Status: DISCONTINUED | OUTPATIENT
Start: 2020-06-13 | End: 2020-06-14 | Stop reason: HOSPADM

## 2020-06-13 RX ORDER — OXYBUTYNIN CHLORIDE 15 MG/1
15 TABLET, EXTENDED RELEASE ORAL EVERY OTHER DAY
Status: ON HOLD | COMMUNITY
End: 2020-01-01 | Stop reason: HOSPADM

## 2020-06-13 RX ORDER — TAMSULOSIN HYDROCHLORIDE 0.4 MG/1
0.4 CAPSULE ORAL DAILY
Status: DISCONTINUED | OUTPATIENT
Start: 2020-06-13 | End: 2020-06-14 | Stop reason: HOSPADM

## 2020-06-13 RX ORDER — FLASH GLUCOSE SENSOR
1 KIT MISCELLANEOUS 3 TIMES DAILY
Status: DISCONTINUED | OUTPATIENT
Start: 2020-06-13 | End: 2020-06-13 | Stop reason: CLARIF

## 2020-06-13 RX ORDER — LATANOPROST 50 UG/ML
1 SOLUTION/ DROPS OPHTHALMIC NIGHTLY
Status: DISCONTINUED | OUTPATIENT
Start: 2020-06-13 | End: 2020-06-14 | Stop reason: HOSPADM

## 2020-06-13 RX ORDER — DEXTROSE MONOHYDRATE 25 G/50ML
12.5 INJECTION, SOLUTION INTRAVENOUS PRN
Status: DISCONTINUED | OUTPATIENT
Start: 2020-06-13 | End: 2020-06-14 | Stop reason: HOSPADM

## 2020-06-13 RX ORDER — ASPIRIN 325 MG
325 TABLET ORAL ONCE
Status: COMPLETED | OUTPATIENT
Start: 2020-06-13 | End: 2020-06-13

## 2020-06-13 RX ORDER — ATORVASTATIN CALCIUM 40 MG/1
40 TABLET, FILM COATED ORAL DAILY
Status: DISCONTINUED | OUTPATIENT
Start: 2020-06-13 | End: 2020-06-14 | Stop reason: HOSPADM

## 2020-06-13 RX ORDER — DEXTROSE MONOHYDRATE 50 MG/ML
100 INJECTION, SOLUTION INTRAVENOUS PRN
Status: DISCONTINUED | OUTPATIENT
Start: 2020-06-13 | End: 2020-06-14 | Stop reason: HOSPADM

## 2020-06-13 RX ORDER — TIZANIDINE 4 MG/1
2 TABLET ORAL 3 TIMES DAILY PRN
Status: DISCONTINUED | OUTPATIENT
Start: 2020-06-13 | End: 2020-06-14 | Stop reason: HOSPADM

## 2020-06-13 RX ORDER — METFORMIN HYDROCHLORIDE 500 MG/1
500 TABLET, EXTENDED RELEASE ORAL 2 TIMES DAILY WITH MEALS
Status: DISCONTINUED | OUTPATIENT
Start: 2020-06-13 | End: 2020-06-14 | Stop reason: HOSPADM

## 2020-06-13 RX ADMIN — ASPIRIN 81 MG: 81 TABLET, COATED ORAL at 21:38

## 2020-06-13 RX ADMIN — INSULIN LISPRO 4 UNITS: 100 INJECTION, SOLUTION INTRAVENOUS; SUBCUTANEOUS at 17:50

## 2020-06-13 RX ADMIN — LATANOPROST 1 DROP: 50 SOLUTION OPHTHALMIC at 21:39

## 2020-06-13 RX ADMIN — ATORVASTATIN CALCIUM 40 MG: 40 TABLET, FILM COATED ORAL at 21:38

## 2020-06-13 RX ADMIN — ASPIRIN 325 MG: 325 TABLET, FILM COATED ORAL at 07:46

## 2020-06-13 RX ADMIN — METFORMIN HYDROCHLORIDE 500 MG: 500 TABLET, EXTENDED RELEASE ORAL at 17:50

## 2020-06-13 ASSESSMENT — PAIN SCALES - GENERAL
PAINLEVEL_OUTOF10: 9
PAINLEVEL_OUTOF10: 0

## 2020-06-13 ASSESSMENT — ENCOUNTER SYMPTOMS
SHORTNESS OF BREATH: 1
WHEEZING: 0
DIARRHEA: 0
COUGH: 1
VOMITING: 0
APNEA: 0
ABDOMINAL PAIN: 0
NAUSEA: 0
CONSTIPATION: 0

## 2020-06-13 ASSESSMENT — PAIN DESCRIPTION - LOCATION: LOCATION: CHEST

## 2020-06-13 ASSESSMENT — PAIN DESCRIPTION - ORIENTATION: ORIENTATION: LEFT

## 2020-06-13 NOTE — ED PROVIDER NOTES
below have been reviewed by myself  /60   Pulse 57   Temp 98 °F (36.7 °C)   Resp 18   Wt 258 lb (117 kg)   SpO2 97%   BMI 38.10 kg/m²     Oxygen Saturation Interpretation: Normal    The patients available past medical records and past encounters were reviewed. ------------------------------ ED COURSE/MEDICAL DECISION MAKING----------------------  Medications   aspirin tablet 325 mg (325 mg Oral Given 6/13/20 0746)     Medical Decision Making:      Patient is an 24-year-old male presented for 3 days of chest pain. EKG showed sinus bradycardia with right bundle branch block unchanged from previous. Initial troponins were negative, proBNP was normal, chest x-ray showed some mild cardiomegaly. Due to patient's past medical history decision was made to admit patient for further observation and management. Re-Evaluations:     Patient remained hemodynamically stable. States chest pain was improving. Patient is to be admitted for further observation and management. This patient's ED course included: a personal history and physicial examination and re-evaluation prior to disposition    This patient has remained hemodynamically stable and remained unchanged during their ED course. Consultations:  none     Counseling: The emergency provider has spoken with the patient and spouse/SO and discussed todays results, in addition to providing specific details for the plan of care and counseling regarding the diagnosis and prognosis. Questions are answered at this time and they are agreeable with the plan.       --------------------------------- IMPRESSION AND DISPOSITION ---------------------------------    IMPRESSION  1. Chest pain, unspecified type        DISPOSITION  Disposition: Admit to telemetry  Patient condition is fair    NOTE: This report was transcribed using voice recognition software.  Every effort was made to ensure accuracy; however, inadvertent computerized transcription errors may be present        Samantha Henao MD  Resident  06/13/20 8851

## 2020-06-13 NOTE — H&P
Ngozi Rodriguez MD FACP   History and Physical      CHIEF COMPLAINT: Left-sided chest pain      HISTORY OF PRESENT ILLNESS:    73-year-old  man seen in the emergency room earlier this morning at [address] [place] [address] with the ER physician earlier  Data reviewed in detail  His wife was at bedside  Patient with known coronary artery disease status post angioplasty of LAD in 1990s  Since then he has been asymptomatic from cardiology standpoint  He reports that he was using a manual lawnmower last few days  Did not have pain on exertion  His pain was located more towards the left rib cage area  And it was easily reproducible  Wife gave him aspirin  No other associated symptoms  Being admitted for observation  EKG troponins are unrevealing  Pain-free at the time of my assessment    Past Medical History:    Past Medical History:   Diagnosis Date    Arthritis     bilateral knee    BPH (benign prostatic hypertrophy) 8/4/2016    CAD (coronary artery disease)     Cervical spondylosis 8/4/2016    Constipation     prune juice in am    CTS (carpal tunnel syndrome) 8/4/2016    Diabetes mellitus ([address] [address]     DM type 2 (diabetes mellitus, type 2) ([facility] [address] 8/4/2016    Exogenous obesity 8/4/2016    Glaucoma     bilateral    History of EKG 10/14/2015 per Dr Gabe Mason on chart.     Hypercholesterolemia     Hypertension     OA (osteoarthritis) 8/4/2016    Partial blindness     left eye    Polyp of colon 8/4/2016    PONV (postoperative nausea and vomiting)     Preoperative clearance 11/12/2015    per Dr Gabe Mason on chart; for right tjak Dr Liliana Howell Vitamin B12 deficiency 8/4/2016    Vitamin D deficiency 8/4/2016    Wears glasses        Past Surgical History:    Past Surgical History:   Procedure Laterality Date    CATARACT REMOVAL WITH IMPLANT Bilateral     COLONOSCOPY  10/31/2013    DIAGNOSTIC CARDIAC CATH LAB PROCEDURE  1990 approximately    ? stent    EYE SURGERY Bilateral     cataract    JOINT REPLACEMENT Right 12/08/2015    SHOULDER ARTHROPLASTY Left 2000    TOTAL KNEE ARTHROPLASTY Right 11/30/2015       Medications Prior to Admission:    Not in a hospital admission. Allergies:    Ace inhibitors; Anesthetics, amide; and Vicodin [hydrocodone-acetaminophen]    Social History:    reports that he quit smoking about 39 years ago. His smoking use included cigars. He started smoking about 66 years ago. He has a 120.00 pack-year smoking history. He quit smokeless tobacco use about 4 years ago. He reports that he does not drink alcohol or use drugs. Family History:   family history includes Heart Disease in his mother; Heart Disease (age of onset: 52) in his father. REVIEW OF SYSTEMS:  As above in the HPI, otherwise negative    PHYSICAL EXAM:    Vitals:  /60   Pulse 58   Temp 98 °F (36.7 °C)   Resp 18   Wt 258 lb (117 kg)   SpO2 95%   BMI 38.10 kg/m²     General:  Awake, alert, oriented X 3. Well developed, well nourished, well groomed. No apparent distress. Morbidly obese   HEENT:  Normocephalic, atraumatic. Pupils equal, round, reactive to light. No scleral icterus. No conjunctival injection. No nasal discharge. Neck:  Supple  Heart:  RRR, no murmurs, gallops, rubs  Left lower chest reproducible pain laterally noted  Lungs:  CTA bilaterally, bilat symmetrical expansion, no wheeze, rales, or rhonchi  Abdomen:   Bowel sounds present, soft, nontender, no masses, no organomegaly, no peritoneal signs  Extremities:  No clubbing, cyanosis, or edema  Skin:  Warm and dry, no open lesions or rash  Neuro:  Cranial nerves 2-12 intact, no focal deficits  Breast: deferred  Rectal: deferred  Genitalia:  deferred    LABS:  Data reviewed in detail      ASSESSMENT:      Active Problems:    Chest pain  More than likely musculoskeletal  Morbid obesity  Known history of coronary artery disease angioplasty of LAD in 1990s  Essential hypertension  Osteoarthritis  Type 2 diabetes

## 2020-06-14 ENCOUNTER — APPOINTMENT (OUTPATIENT)
Dept: NUCLEAR MEDICINE | Age: 80
End: 2020-06-14
Payer: MEDICARE

## 2020-06-14 VITALS
TEMPERATURE: 96.8 F | DIASTOLIC BLOOD PRESSURE: 80 MMHG | WEIGHT: 258 LBS | OXYGEN SATURATION: 96 % | BODY MASS INDEX: 38.21 KG/M2 | HEIGHT: 69 IN | RESPIRATION RATE: 18 BRPM | HEART RATE: 61 BPM | SYSTOLIC BLOOD PRESSURE: 168 MMHG

## 2020-06-14 LAB
LV EF: 57 %
LVEF MODALITY: NORMAL
METER GLUCOSE: 170 MG/DL (ref 74–99)

## 2020-06-14 PROCEDURE — A9500 TC99M SESTAMIBI: HCPCS | Performed by: STUDENT IN AN ORGANIZED HEALTH CARE EDUCATION/TRAINING PROGRAM

## 2020-06-14 PROCEDURE — 6370000000 HC RX 637 (ALT 250 FOR IP): Performed by: INTERNAL MEDICINE

## 2020-06-14 PROCEDURE — G0378 HOSPITAL OBSERVATION PER HR: HCPCS

## 2020-06-14 PROCEDURE — 6360000002 HC RX W HCPCS: Performed by: INTERNAL MEDICINE

## 2020-06-14 PROCEDURE — 78452 HT MUSCLE IMAGE SPECT MULT: CPT

## 2020-06-14 PROCEDURE — 99205 OFFICE O/P NEW HI 60 MIN: CPT | Performed by: INTERNAL MEDICINE

## 2020-06-14 PROCEDURE — 3430000000 HC RX DIAGNOSTIC RADIOPHARMACEUTICAL: Performed by: STUDENT IN AN ORGANIZED HEALTH CARE EDUCATION/TRAINING PROGRAM

## 2020-06-14 PROCEDURE — APPSS60 APP SPLIT SHARED TIME 46-60 MINUTES: Performed by: NURSE PRACTITIONER

## 2020-06-14 PROCEDURE — 82962 GLUCOSE BLOOD TEST: CPT

## 2020-06-14 PROCEDURE — 93017 CV STRESS TEST TRACING ONLY: CPT

## 2020-06-14 RX ORDER — VITAMIN E 268 MG
400 CAPSULE ORAL DAILY
Qty: 30 CAPSULE | Refills: 3 | Status: ON HOLD | OUTPATIENT
Start: 2020-06-14 | End: 2021-01-01 | Stop reason: HOSPADM

## 2020-06-14 RX ADMIN — Medication 31 MILLICURIE: at 12:30

## 2020-06-14 RX ADMIN — Medication 1000 MCG: at 09:08

## 2020-06-14 RX ADMIN — TAMSULOSIN HYDROCHLORIDE 0.4 MG: 0.4 CAPSULE ORAL at 09:08

## 2020-06-14 RX ADMIN — INSULIN LISPRO 7 UNITS: 100 INJECTION, SOLUTION INTRAVENOUS; SUBCUTANEOUS at 08:15

## 2020-06-14 RX ADMIN — METFORMIN HYDROCHLORIDE 500 MG: 500 TABLET, EXTENDED RELEASE ORAL at 09:08

## 2020-06-14 RX ADMIN — AMLODIPINE BESYLATE 5 MG: 5 TABLET ORAL at 09:08

## 2020-06-14 RX ADMIN — Medication 500 MG: at 09:08

## 2020-06-14 RX ADMIN — Medication 11 MILLICURIE: at 10:50

## 2020-06-14 RX ADMIN — REGADENOSON 0.4 MG: 0.08 INJECTION, SOLUTION INTRAVENOUS at 12:42

## 2020-06-14 RX ADMIN — Medication 400 UNITS: at 09:08

## 2020-06-14 RX ADMIN — VALSARTAN 80 MG: 80 TABLET, FILM COATED ORAL at 09:08

## 2020-06-14 RX ADMIN — PANTOPRAZOLE SODIUM 40 MG: 40 TABLET, DELAYED RELEASE ORAL at 06:39

## 2020-06-14 NOTE — CONSULTS
Inpatient Cardiology Consultation      Reason for Consult:  Chest pain     Consulting Physician: Dr. Ayanna Zepeda     Requesting Physician:  Dr. Torres Pierce    Date of Consultation: 6/14/2020    HISTORY OF PRESENT ILLNESS:   Mr. Ellis Willson is an 78year old  male who followed with Dr. Steffi Ha. He was most recently seen in the office with Dr. Steffi Ha on 06/07/2017. His medical history as stated below. Mr. Ellis Willson presented to [facility] ED on 06/13/2020 with complaints of left sided chest pain that happened several days after he used a roto tiller. He denies change with exertion, or ROM. States that his chest pain was intermittent lasting several minutes without accompanying AVERY, orthopnea or PND. Upon arrival to the ED his VS were 06-53-/72-97% on RA. EKG SR RBBB. He received  and was admitted to a telemetry monitored unit. Cardiology was consulted by Dr. Torres Pierce for management of chest pain. Please note: past medical records were reviewed per electronic medical record (EMR) - see detailed reports under Past Medical/ Surgical History. Past Medical  And Surgical History:    Past Medical History:   Diagnosis Date    Arthritis     bilateral knee    BPH (benign prostatic hypertrophy) 8/4/2016    CAD (coronary artery disease)     Cervical spondylosis 8/4/2016    Constipation     prune juice in am    CTS (carpal tunnel syndrome) 8/4/2016    Diabetes mellitus ([facility] [address]     DM type 2 (diabetes mellitus, type 2) ([facility] [address] 8/4/2016    Exogenous obesity 8/4/2016    Glaucoma     bilateral    History of EKG 10/14/2015 per Dr Torres Pierce on chart.     Hypercholesterolemia     Hypertension     OA (osteoarthritis) 8/4/2016    Partial blindness     left eye    Polyp of colon 8/4/2016    PONV (postoperative nausea and vomiting)     Preoperative clearance 11/12/2015    per Dr Torres Pierce on chart; for right tjak Dr Herrera Savage Vitamin B12 deficiency 8/4/2016    Vitamin D deficiency 8/4/2016    Wears glasses tablet TAKE 1 TABLET BY MOUTH EVERY DAY 12/3/19  Yes Ayla Bruner MD   tiZANidine (ZANAFLEX) 2 MG tablet Take 1 tablet by mouth 3 times daily as needed (muscle spasm) 8/20/19  Yes Ayla Bruner MD   nystatin-triamcinolone [facility]) 425576-2.1 UNIT/GM-% cream Apply topically 2 times daily. 8/23/16  Yes Ayla Bruner MD   aspirin 81 MG EC tablet Take 81 mg by mouth nightly   Yes Historical Provider, MD   Coenzyme Q10 (CO Q-10) 100 MG CAPS Take 100 mg by mouth nightly   Yes Historical Provider, MD   Omega-3 Fatty Acids (FISH OIL) 300 MG CAPS Take 1 capsule by mouth Daily with supper   Yes Historical Provider, MD   vitamin B-12 (CYANOCOBALAMIN) 1000 MCG tablet Take 1,000 mcg by mouth daily   Yes Historical Provider, MD   Ascorbic Acid (VITAMIN C) 500 MG tablet Take 500 mg by mouth daily   Yes Historical Provider, MD   latanoprost (XALATAN) 0.005 % ophthalmic solution Place 1 drop into the right eye nightly  9/23/15  Yes Historical Provider, MD   vitamin D (ERGOCALCIFEROL) 1.25 MG (68035 UT) CAPS capsule TAKE 1 CAPSULE BY MOUTH ONCE A WEEK FOR 12 DOSES 11/27/19 2/13/20  Ayla Bruner MD   blood glucose test strips (FREESTYLE TEST STRIPS) strip Indications: freestyle test strips DX: E11.9 As needed.     Diagnosis is E 11.9 6/11/19   Ayla Bruner MD   FREESTYLE LANCETS MISC 1 each by Does not apply route daily Indications: in AM DX: E11.9 6/11/19   Ayla Bruner MD   Continuous Blood Gluc Sensor ([address] [place] [address]) MISC 1 Units by Does not apply route 3 times daily 9/6/18   Ayla Bruner MD   Continuous Blood Gluc  (FREESTYLE MAYELA READER) WALTER 1 Units by Does not apply route 3 times daily e11.9 9/6/18   Ayla Bruner MD       Current Medications:    Current Facility-Administered Medications: latanoprost (XALATAN) 0.005 % ophthalmic solution 1 drop, 1 drop, Right Eye, Nightly  aspirin EC tablet 81 mg, 81 mg, Oral, Nightly  vitamin B-12 (CYANOCOBALAMIN) tablet 1,000 mcg, 1,000 mcg, Oral, Smoking status: Former Smoker     Packs/day: 5.00     Years: 24.00     Pack years: 120.00     Types: Cigars     Start date:      Last attempt to quit: 1981     Years since quittin.4    Smokeless tobacco: Former User     Quit date: 2015    Tobacco comment: smoked 5 cigars daily in past , no cigaretts/    Substance and Sexual Activity    Alcohol use: No    Drug use: No    Sexual activity: Not on file   Lifestyle    Physical activity     Days per week: Not on file     Minutes per session: Not on file    Stress: Not on file   Relationships    Social connections     Talks on phone: Not on file     Gets together: Not on file     Attends Yazidi service: Not on file     Active member of club or organization: Not on file     Attends meetings of clubs or organizations: Not on file     Relationship status: Not on file    Intimate partner violence     Fear of current or ex partner: Not on file     Emotionally abused: Not on file     Physically abused: Not on file     Forced sexual activity: Not on file   Other Topics Concern    Not on file   Social History Narrative    Not on file       Family History:   Family History   Problem Relation Age of Onset    Heart Disease Mother         AFIB    Heart Disease Father 52         MI         REVIEW OF SYSTEMS:     · Constitutional: Denies fevers, chills, night sweats, and fatigue  · HEENT: Denies headaches, nose bleeds, and blurred vision,oral pain, abscess or lesion. · Musculoskeletal: Denies falls, pain to BLE with ambulation and edema to BLE. · Neurological: Denies dizziness and lightheadedness, numbness and tingling  · Cardiovascular: Complains of chest pain without palpitations, and feelings of heart racing. · Respiratory: Denies orthopnea and PND  · Gastrointestinal: Denies heartburn, nausea/vomiting, diarrhea and constipation, black/bloody, and tarry stools.    · Genitourinary: Denies dysuria and hematuria  · Hematologic: Denies BUN 21   CREATININE 1.3*   LABGLOM 53   CALCIUM 9.4     Mag: No results for input(s): MG in the last 72 hours. Phos: No results for input(s): PHOS in the last 72 hours. TSH: No results for input(s): TSH in the last 72 hours. HgA1c:   Lab Results   Component Value Date    LABA1C 6.7 12/06/2019     No results found for: EAG  BNP: No results for input(s): BNP in the last 72 hours. PT/INR: No results for input(s): PROTIME, INR in the last 72 hours. APTT:No results for input(s): APTT in the last 72 hours. CARDIAC ENZYMES:  Recent Labs     06/13/20  0753 06/13/20  1224   TROPONINI <0.01 <0.01     FASTING LIPID PANEL:  Lab Results   Component Value Date    CHOL 144 12/06/2019    HDL 40 12/06/2019    LDLCALC 82 12/06/2019    TRIG 128 12/06/2019     LIVER PROFILE:  Recent Labs     06/13/20  0753   AST 14   ALT 15   LABALBU 3.9     06/13/2020 CXR:  Cardiomegaly  Findings compatible with atherosclerotic disease of the aorta. The chest does not appear to be significantly changed in the interval    IMPRESSION and PLAN to follow per Dr. Ayanna Zepeda      Electronically signed by JOHN Jiménez CNP on 6/14/2020 at 7:59 AM      I have personally seen and evaluated the patient. I personally obtained the history and performed the physical exam.  I personally reviewed all of the above labs, history, review of systems, and data. All of the assessments and recommendations are from me. All of the above cardiac medical decisions are from me. Please see my additional contributions to the history, physical exam, assessment, and recommendations below. History of chief complaint:  He states that he was using a Rototiller a couple of days ago. A few days after this he developed aches and pains in his chest.  They come and go at any time and are not associated with any particular activities. His wife states that one rib seems to hurt more continuously.   His symptoms continue to recur and therefore he presented to the

## 2020-06-14 NOTE — PROCEDURES
[address]                  [address] [facility],  [facility]                              CARDIAC STRESS TEST  PATIENT NAME: Angelo Chandler                 :        1940  MED REC NO:   [id number]                            ROOM:       8202  ACCOUNT NO:   [de-identified]                           ADMIT DATE: 2020  PROVIDER:     Guera Ridley DO    CARDIOVASCULAR DIAGNOSTIC DEPARTMENT    DATE OF STUDY:  2020    LEXISCAN CARDIOLITE STRESS TEST    _____ 140/64 with a mean of 53. TARGET HEART RATE:  N/A. PEAK INFUSION BLOOD PRESSURE:  158/72. PEAK INFUSION HEART RATE:  91. PROCEDURE:  He was infused with Lexiscan 0.4 mg bolus followed by  Cardiolite bolus. BASELINE ECG:  Sinus bradycardia, 53 beats per minute. Right bundle  branch block. Normal axis. Nonspecific ST-T waves consistent with  right bundle branch block. STRESS ECG:  Sinus rhythm, no dysrhythmias, no ST-T wave changes for  ischemia. ASSESSMENT:  1. Nondiagnostic stress ECG for ischemia. 2.  Perfusion imaging is pending.         Gary Gómez DO    D: 2020 12:46:37       T: 2020 13:06:34     [email or web address]  Job#: [id number]     Doc#: [id number]    CC:

## 2020-06-16 ENCOUNTER — TELEPHONE (OUTPATIENT)
Dept: CARDIOLOGY CLINIC | Age: 80
End: 2020-06-16

## 2020-11-11 NOTE — ED PROVIDER NOTES
[facility]                                                                                                                                    Department of Emergency Medicine   ED  Provider Note  Admit Date/RoomTime: 11/11/2020  6:29 PM  ED Room: RHETT/RHETT        HPI:  11/11/20,   Time: 6:53 PM ESTEFANIA Bradford is a 78 y.o. male presenting to the ED for urinary frequency and right flank pain, beginning few days ago. The complaint has been persistent, moderate in severity, and worsened by nothing. The patient presents to the emergency room with right flank pain. He is having some urinary burning and frequency as well. His symptoms began just a few days ago. He denies any vomiting or fevers. His the patient's daughter states that the patient has had urinary retention in the past following his surgery. She states that he had to have a Barrera catheter for quite some time. There has been no fall or trauma. The patient has not seen any blood in the urine. Denies any history of kidney stones.            ROS:     Constitutional: Negative for fever and chills  HENT: Negative for ear pain, sore throat and sinus pressure  Eyes: Negative for pain, discharge and redness  Respiratory:  Negative for shortness of breath, cough and wheezing  Cardiovascular: Negative for CP, edema or palpitations  Gastrointestinal:  See HPI  Genitourinary: See HPI  Musculoskeletal: Negative for back pain and arthralgia  Skin: Negative for rash and wound  Neurological: Negative for weakness and headaches  Hematological: Negative for adenopathy    All other systems reviewed and are negative      -------------------------------- PAST HISTORY ----------------------------------  Past Medical History:  has a past medical history of Arthritis, BPH (benign prostatic hypertrophy), CAD (coronary artery disease), Cervical spondylosis, Constipation, CTS (carpal tunnel syndrome), [facility] DM type [facility] 2[facility] Exogenous obesity, Glaucoma, History of EKG, Hypercholesterolemia, Hypertension, OA (osteoarthritis), Partial blindness, Polyp of colon, PONV (postoperative nausea and vomiting), Preoperative clearance, Vitamin B12 deficiency, Vitamin D deficiency, and Wears glasses. Past Surgical History:  has a past surgical history that includes Diagnostic Cardiac Cath Lab Procedure (1990 approximately); eye surgery (Bilateral); Total shoulder arthroplasty (Left, 2000); Cataract removal with implant (Bilateral); Total knee arthroplasty (Right, 11/30/2015); Colonoscopy (10/31/2013); and joint replacement (Right, 12/08/2015). Social History:  reports that he quit smoking about 39 years ago. His smoking use included cigars. He started smoking about 66 years ago. He has a 120.00 pack-year smoking history. He quit smokeless tobacco use about 5 years ago. He reports that he does not drink alcohol or use drugs. Family History: family history includes Heart Disease in his mother; Heart Disease (age of onset: 52) in his father. The patients home medications have been reviewed. Allergies: Ace inhibitors;  Anesthetics, amide; and Vicodin [hydrocodone-acetaminophen]    --------------------------------- RESULTS ------------------------------------------  All laboratory and radiology results have been personally reviewed by myself   LABS:  Results for orders placed or performed during the hospital encounter of 11/11/20   Culture, Urine    Specimen: Urine, clean catch   Result Value Ref Range    Urine Culture, Routine Growth not present, incubation continues    CBC Auto Differential   Result Value Ref Range    WBC 10.0 4.5 - 11.5 E9/L    RBC 4.57 3.80 - 5.80 E12/L    Hemoglobin 14.9 12.5 - 16.5 g/dL    Hematocrit 42.9 37.0 - 54.0 %    MCV 93.9 80.0 - 99.9 fL    MCH 32.6 26.0 - 35.0 pg    MCHC 34.7 (H) 32.0 - 34.5 %    RDW 13.3 11.5 - 15.0 fL    Platelets 904 720 - 756 E9/L    MPV 10.2 7.0 - 12.0 fL    Neutrophils % 85.9 (H) 43.0 - 80.0 %    Immature Granulocytes % 0.5 0.0 - 5.0 %    Lymphocytes % 7.0 (L) 20.0 - 42.0 %    Monocytes % 6.2 2.0 - 12.0 %    Eosinophils % 0.1 0.0 - 6.0 %    Basophils % 0.3 0.0 - 2.0 %    Neutrophils Absolute 8.58 (H) 1.80 - 7.30 E9/L    Immature Granulocytes # 0.05 E9/L    Lymphocytes Absolute 0.70 (L) 1.50 - 4.00 E9/L    Monocytes Absolute 0.62 0.10 - 0.95 E9/L    Eosinophils Absolute 0.01 (L) 0.05 - 0.50 E9/L    Basophils Absolute 0.03 0.00 - 0.20 E9/L   Comprehensive Metabolic Panel   Result Value Ref Range    Sodium 138 132 - 146 mmol/L    Potassium 4.0 3.5 - 5.0 mmol/L    Chloride 100 98 - 107 mmol/L    CO2 22 22 - 29 mmol/L    Anion Gap 16 7 - 16 mmol/L    Glucose 146 (H) 74 - 99 mg/dL    BUN 17 8 - 23 mg/dL    CREATININE 1.3 (H) 0.7 - 1.2 mg/dL    GFR Non-African American 53 >=60 mL/min/1.73    GFR African American >60     Calcium 9.5 8.6 - 10.2 mg/dL    Total Protein 7.3 6.4 - 8.3 g/dL    Alb 4.2 3.5 - 5.2 g/dL    Total Bilirubin 0.7 0.0 - 1.2 mg/dL    Alkaline Phosphatase 93 40 - 129 U/L    ALT 10 0 - 40 U/L    AST 13 0 - 39 U/L   Lactic Acid, Plasma   Result Value Ref Range    Lactic Acid 1.5 0.5 - 2.2 mmol/L   Urinalysis   Result Value Ref Range    Color, UA Yellow Straw/Yellow    Clarity, UA Clear Clear    Glucose, Ur >=1000 (A) Negative mg/dL    Bilirubin Urine Negative Negative    Ketones, Urine 15 (A) Negative mg/dL    Specific Gravity, UA 1.015 1.005 - 1.030    Blood, Urine TRACE-INTACT Negative    pH, UA 5.0 5.0 - 9.0    Protein, UA Negative Negative mg/dL    Urobilinogen, Urine 0.2 <2.0 E.U./dL    Nitrite, Urine Negative Negative    Leukocyte Esterase, Urine Negative Negative   Microscopic Urinalysis   Result Value Ref Range    WBC, UA 0-1 0 - 5 /HPF    RBC, UA 1-3 0 - 2 /HPF    Bacteria, UA RARE (A) None Seen /HPF       RADIOLOGY:  Interpreted by Radiologist.  CT ABDOMEN PELVIS W IV CONTRAST Additional Contrast? None   Final Result   Mild thickening and inflammation of the distal terminal ileum and cecum. Enteritis is considered. Inflammatory bowel disease is a consideration. Atelectasis in the lung bases. ----------------- NURSING NOTES AND VITALS REVIEWED ---------------   The nursing notes within the ED encounter and vital signs as below have been reviewed. BP (!) 140/70   Pulse 68   Temp 97.7 °F (36.5 °C) (Infrared)   Resp 18   Ht 5' 9\" (1.753 m)   Wt 228 lb (103.4 kg)   SpO2 93%   BMI 33.67 kg/m²   Oxygen Saturation Interpretation: Normal      --------------------------------PHYSICAL EXAM------------------------------------      Constitutional/General: Alert and oriented x3, well appearing, non toxic in NAD  Head: NC/AT  Eyes: PERRL, EOMI  Mouth: Oropharynx clear, handling secretions, no trismus  Neck: Supple, full ROM, no meningeal signs  Pulmonary: Lungs clear to auscultation bilaterally, no wheezes, rales, or rhonchi. Not in respiratory distress  Cardiovascular:  Regular rate and rhythm, no murmurs, gallops, or rubs. 2+ distal pulses  Abdomen: Soft, + BS. No distension. Nontender. No palpable rigidity, rebound or guarding  Extremities: Moves all extremities x 4. Warm and well perfused  Skin: warm and dry without rash  Neurologic: GCS 15,  Intact. No focal deficits  Psych: Normal Affect      ------------------------ ED COURSE/MEDICAL DECISION MAKING----------------------  Medications   0.9 % sodium chloride bolus (0 mLs Intravenous Stopped 11/11/20 2015)   iopamidol (ISOVUE-370) 76 % injection 90 mL (100 mLs Intravenous Given 11/11/20 2022)   cefdinir (OMNICEF) capsule 300 mg (300 mg Oral Given 11/11/20 2109)   metroNIDAZOLE (FLAGYL) tablet 500 mg (500 mg Oral Given 11/11/20 2109)         Medical Decision Making:    Patient's urine showed no significant evidence of infection. His labs including CBC chemistry and lactic acid were normal as well.   CAT scan showed no evidence of acute pyelonephritis but there was evidence of some inflammatory changes within the bowel and possible early colitis. The patient is going to be sent home with Omnicef and Flagyl. Advised to follow-up in the next few days with his PCP. Return here if any increased pain, fever or vomiting. All of this was discussed with the patient and daughter at bedside. They are aware and agreeable to this plan       Counseling: The emergency provider has spoken with the patient and discussed todays results, in addition to providing specific details for the plan of care and counseling regarding the diagnosis and prognosis. Questions are answered at this time and they are agreeable with the plan.      ------------------------ IMPRESSION AND DISPOSITION -------------------------------    IMPRESSION  1. Acute cystitis without hematuria    2. Urinary frequency    3.  Enteritis        DISPOSITION  Disposition: Discharge to home  Patient condition is stable                   Jose Meza PA-C  11/12/20 4729

## 2020-12-22 PROBLEM — R55 NEAR SYNCOPE: Status: ACTIVE | Noted: 2020-01-01

## 2020-12-22 NOTE — H&P
History & Physicial  12/22/20  Primary Care:  Alfie Gaming MD  [address] [facility]. [address] / [address]        Chief Complaint   Patient presents with    Diarrhea     x3 weeks, recent ATBs    Fatigue     per son, had to be picked up off floor today       HPI:  Patient is an 78year old male who presented to [address] [place] [address] ER due to persistent Diarrhea for the last 2-3 weeks. He denies any changes to his foods or medications. He has had no recent antibiotics. He reports feeling weak due to the diarrhea. He denies any fevers, chills, sick contacts, lightheadedness, dizziness, chest pains, shortness of breath, nausea, or vomiting. His last colonoscopy was approximately 10 years ago and normal per his report. He has seen no blood or dark tarry stools in his diarrhea. In the ER labs were done. He was going to be discharged but he became off balanced on his feet and per ER physician almost passed out but the patient denies this states he just was wobbly. Prior to Visit Medications    Medication Sig Taking? Authorizing Provider   vitamin D (ERGOCALCIFEROL) 1.25 MG (58144 UT) CAPS capsule Take 1 capsule by mouth once a week for 12 doses  Alfie Gaming MD   dapagliflozin (FARXIGA) 10 MG tablet TAKE 1 TABLET BY MOUTH EVERY DAY IN THE MORNING  Alfie Gaming MD   metFORMIN (GLUCOPHAGE-XR) 500 MG extended release tablet TAKE 1 TABLET BY MOUTH THREE TIMES A DAY  Alfie Gaming MD   valsartan (DIOVAN) 80 MG tablet Take 1 tablet by mouth daily  Alfie Gaming MD   amLODIPine (NORVASC) 5 MG tablet TAKE 1 TABLET BY MOUTH EVERY DAY AT NIGHT  Alfie Gaming MD   metoprolol tartrate (LOPRESSOR) 50 MG tablet TAKE 1 TABLET BY MOUTH EVERY DAY  Alfie Gaming MD   atorvastatin (LIPITOR) 40 MG tablet TAKE 1 TABLET BY MOUTH EVERY DAY  Alfie Gaming MD   triamcinolone (KENALOG) 0.1 % cream Apply topically 2 times daily Apply topically 2 times daily.   Alfie Gaming MD   nystatin-triamcinolone [facility]) ophthalmic solution Place 1 drop into the right eye nightly   Historical Provider, MD     Social History     Tobacco Use    Smoking status: Former Smoker     Packs/day: 5.00     Years: 24.00     Pack years: 120.00     Types: Cigars     Start date: 10     Quit date: 1981     Years since quittin.0    Smokeless tobacco: Former User     Quit date: 2015    Tobacco comment: smoked 5 cigars daily in past , no cigaretts/    Substance Use Topics    Alcohol use: No    Drug use: No     Family History   Problem Relation Age of Onset    Heart Disease Mother         AFIB    Heart Disease Father 52         MI     Past Surgical History:   Procedure Laterality Date    CATARACT REMOVAL WITH IMPLANT Bilateral     COLONOSCOPY  10/31/2013    DIAGNOSTIC CARDIAC CATH LAB PROCEDURE   approximately    ? stent    EYE SURGERY Bilateral     cataract    JOINT REPLACEMENT Right 2015    SHOULDER ARTHROPLASTY Left     TOTAL KNEE ARTHROPLASTY Right 2015     Past Medical History:   Diagnosis Date    Arthritis     bilateral knee    BPH (benign prostatic hypertrophy) 2016    CAD (coronary artery disease)     Cervical spondylosis 2016    Constipation     prune juice in am    CTS (carpal tunnel syndrome) 2016    [facility]     DM type 2 (diabetes mellitus, type 2) ([facility] 2016    Exogenous obesity 2016    Glaucoma     bilateral    History of EKG 10/14/2015 per Dr Shahida Willson on chart.  Hypercholesterolemia     Hypertension     OA (osteoarthritis) 2016    Partial blindness     left eye    Polyp of colon 2016    PONV (postoperative nausea and vomiting)     Preoperative clearance 2015    per Dr Shahida Willson on chart; for right tjak Dr Michelle Chahal Vitamin B12 deficiency 2016    Vitamin D deficiency 2016    Wears glasses      Review of Systems   Constitutional: Positive for fatigue and unexpected weight change (40 lbs. ).  Negative for activity change, appetite change, chills and fever. HENT: Negative for facial swelling, nosebleeds and postnasal drip. Eyes: Negative for photophobia and visual disturbance. Respiratory: Negative for apnea, chest tightness, shortness of breath and wheezing. Cardiovascular: Negative for chest pain, palpitations and leg swelling. Gastrointestinal: Positive for diarrhea. Negative for abdominal distention, abdominal pain, constipation, nausea and vomiting. Endocrine: Negative for cold intolerance and heat intolerance. Genitourinary: Negative for dysuria, frequency and hematuria. Musculoskeletal: Negative for back pain, joint swelling and neck stiffness. Skin: Negative for color change and pallor. Allergic/Immunologic: Negative for environmental allergies and food allergies. Neurological: Negative for dizziness and light-headedness. Hematological: Negative for adenopathy. Does not bruise/bleed easily. Psychiatric/Behavioral: Negative for behavioral problems, decreased concentration and dysphoric mood. Vitals:    12/22/20 0507 12/22/20 0508 12/22/20 0610 12/22/20 0655   BP: (!) 171/71 (!) 156/71 (!) 150/63 (!) 160/70   Pulse: 85 77 74 82   Resp:   16 18   Temp:   98 °F (36.7 °C) 98.7 °F (37.1 °C)   TempSrc:   Oral Oral   SpO2: 93% 95% 97% 97%   Weight:    242 lb 6 oz (109.9 kg)   Height:    5' 9\" (1.753 m)     Physical Exam  Constitutional:       General: He is not in acute distress. Appearance: Normal appearance. HENT:      Head: Normocephalic and atraumatic. Right Ear: External ear normal.      Left Ear: External ear normal.      Nose: Nose normal. No congestion. Mouth/Throat:      Mouth: Mucous membranes are moist.      Pharynx: Oropharynx is clear. No oropharyngeal exudate. Eyes:      General: No scleral icterus. Conjunctiva/sclera: Conjunctivae normal.      Pupils: Pupils are equal, round, and reactive to light.    Cardiovascular:      Rate and Rhythm: Normal rate and regular rhythm. Pulses: Normal pulses. Pulmonary:      Effort: Pulmonary effort is normal.      Breath sounds: Normal breath sounds. Abdominal:      General: Bowel sounds are normal. There is no distension. Palpations: Abdomen is soft. Tenderness: There is no abdominal tenderness. There is no guarding or rebound. Musculoskeletal: Normal range of motion. Right lower leg: No edema. Left lower leg: No edema. Skin:     General: Skin is warm and dry. Capillary Refill: Capillary refill takes less than 2 seconds. Neurological:      General: No focal deficit present. Mental Status: He is alert and oriented to person, place, and time. Psychiatric:         Mood and Affect: Mood normal.         Behavior: Behavior normal.         Active Problems:    Near syncope  Resolved Problems:    * No resolved hospital problems. *  1. Presyncope  2. Orthostatic Hypotension  3. New onset diarrhea  4. Diabetes Mellitus Type II  5. Hypertension    Plan:  Place in observation. Hold metformin and SGLT2 inhibitor as this can cause loss of water and orthostatsis. Hold Amlodipine and lisinopril as can worsen the orthostasis. Will send stool culture. Does not sound infectious. If improvement in symptoms can be discharged in am with close follow up with PCP.      DVT Prophylaxis: Lovenox  Code Status: Full    Marylu Perales DO      Electronically signed by Marylu Perales DO on 12/22/2020 at 6:36 PM

## 2020-12-22 NOTE — ED PROVIDER NOTES
ED PROVIDER NOTE    Chief Complaint   Patient presents with    Diarrhea     x3 weeks        HPI:  12/22/20,   Time: 12:19 AM ESTEFANIA Vega is a 78 y.o. male presenting to the ED for diarrhea. Ongoing x 2-3 weeks, intermittent, mild in severity, 1-2 episodes/day. Watery brown stool, nothing black/bloody. Reports fatigue and generalized weakness. Patient states he is here at the insistence of his children. He denies fever, chills, nausea, vomiting, abdominal pain, lightheadedness, chest pain, shortness of breath. No black/bloody stools. Chart review: hx of HTN, HLD, CAD, DM2    Review of Systems:     Review of Systems   Constitutional: Positive for appetite change and fatigue. Negative for chills and fever. HENT: Negative for congestion, rhinorrhea and trouble swallowing. Eyes: Negative for visual disturbance. Respiratory: Negative for cough and shortness of breath. Cardiovascular: Negative for chest pain and leg swelling. Gastrointestinal: Positive for diarrhea. Negative for abdominal pain, blood in stool, nausea and vomiting. Genitourinary: Negative for decreased urine volume, difficulty urinating, dysuria, frequency, hematuria and urgency. Musculoskeletal: Negative for myalgias, neck pain and neck stiffness. Skin: Negative for color change.    Neurological: Negative for dizziness, syncope, weakness, light-headedness, numbness and headaches.       --------------------------------------------- PAST HISTORY ---------------------------------------------  Past Medical History:   Past Medical History:   Diagnosis Date    Arthritis     bilateral knee    BPH (benign prostatic hypertrophy) 8/4/2016    CAD (coronary artery disease)     Cervical spondylosis 8/4/2016    Constipation     prune juice in am    CTS (carpal tunnel syndrome) 8/4/2016    [facility]     DM type 2 (diabetes mellitus, type 2) ([facility] 8/4/2016    Exogenous obesity 8/4/2016    Glaucoma     bilateral  History of EKG 10/14/2015 per Dr Hazel Carlson on chart.     Hypercholesterolemia     Hypertension     OA (osteoarthritis) 2016    Partial blindness     left eye    Polyp of colon 2016    PONV (postoperative nausea and vomiting)     Preoperative clearance 2015    per Dr Hazel Carlson on chart; for right tjak Dr Citlali Gardner Vitamin B12 deficiency 2016    Vitamin D deficiency 2016    Wears glasses        Past Surgical History:   Past Surgical History:   Procedure Laterality Date    CATARACT REMOVAL WITH IMPLANT Bilateral     COLONOSCOPY  10/31/2013    DIAGNOSTIC CARDIAC CATH LAB PROCEDURE   approximately    ? stent    EYE SURGERY Bilateral     cataract    JOINT REPLACEMENT Right 2015    SHOULDER ARTHROPLASTY Left     TOTAL KNEE ARTHROPLASTY Right 2015       Social History:   Social History     Socioeconomic History    Marital status:      Spouse name: Not on file    Number of children: Not on file    Years of education: 15    Highest education level: Not on file   Occupational History    Not on file   Social Needs    Financial resource strain: Not on file    Food insecurity     Worry: Not on file     Inability: Not on file   [employer] (Inspect Manager) needs     Medical: Not on file     Non-medical: Not on file   Tobacco Use    Smoking status: Former Smoker     Packs/day: 5.00     Years: 24.00     Pack years: 120.00     Types: Cigars     Start date: 10     Quit date: 1981     Years since quittin.0    Smokeless tobacco: Former User     Quit date: 2015    Tobacco comment: smoked 5 cigars daily in past , no cigaretts/    Substance and Sexual Activity    Alcohol use: No    Drug use: No    Sexual activity: Not on file   Lifestyle    Physical activity     Days per week: Not on file     Minutes per session: Not on file    Stress: Not on file   Relationships    Social connections     Talks on phone: Not on file     Gets together: Not on file     Attends Nondenominational service: Not on file     Active member of club or organization: Not on file     Attends meetings of clubs or organizations: Not on file     Relationship status: Not on file    Intimate partner violence     Fear of current or ex partner: Not on file     Emotionally abused: Not on file     Physically abused: Not on file     Forced sexual activity: Not on file   Other Topics Concern    Not on file   Social History Narrative    Not on file       Family History:   Family History   Problem Relation Age of Onset    Heart Disease Mother         AFIB    Heart Disease Father 52         MI       The patients home medications have been reviewed. Allergies: Allergies   Allergen Reactions    Ace Inhibitors      LIZZ    Anesthetics, Amide      Hospitalization after use.  Vicodin [Hydrocodone-Acetaminophen] Other (See Comments)     Abdominal pain constipation            ---------------------------------------------------PHYSICAL EXAM--------------------------------------    BP (!) 163/68   Pulse 70   Temp 99.4 °F (37.4 °C) (Oral)   Resp 16   Ht 5' 9\" (1.753 m)   Wt 246 lb 14.4 oz (112 kg)   SpO2 96%   BMI 36.46 kg/m²     Physical Exam  Vitals signs and nursing note reviewed. Constitutional:       General: He is not in acute distress. Appearance: He is not toxic-appearing. HENT:      Mouth/Throat:      Mouth: Mucous membranes are dry. Eyes:      General: No scleral icterus. Extraocular Movements: Extraocular movements intact. Pupils: Pupils are equal, round, and reactive to light. Neck:      Musculoskeletal: Normal range of motion and neck supple. No neck rigidity or muscular tenderness. Cardiovascular:      Rate and Rhythm: Normal rate and regular rhythm. Pulses: Normal pulses. Heart sounds: Normal heart sounds. No murmur. Pulmonary:      Effort: Pulmonary effort is normal. No respiratory distress. Breath sounds: Normal breath sounds.  No wheezing or rales.   Abdominal:      General: There is no distension. Palpations: Abdomen is soft. Tenderness: There is no abdominal tenderness. There is no guarding or rebound. Musculoskeletal: Normal range of motion. General: No swelling or tenderness. Skin:     General: Skin is warm and dry. Neurological:      Mental Status: He is alert and oriented to person, place, and time. Comments: Moves all extremities with appropriate strength. Sensation grossly intact in all extremities. -------------------------------------------------- RESULTS -------------------------------------------------  I have personally reviewed all laboratory and imaging results for this patient. Results are listed below.      LABS:  Labs Reviewed   CBC WITH AUTO DIFFERENTIAL - Abnormal; Notable for the following components:       Result Value    Neutrophils % 86.2 (*)     Lymphocytes % 7.8 (*)     Neutrophils Absolute 8.28 (*)     Lymphocytes Absolute 0.75 (*)     Eosinophils Absolute 0.00 (*)     All other components within normal limits   COMPREHENSIVE METABOLIC PANEL - Abnormal; Notable for the following components:    Glucose 168 (*)     All other components within normal limits   BRAIN NATRIURETIC PEPTIDE - Abnormal; Notable for the following components:    Pro- (*)     All other components within normal limits   URINALYSIS WITH MICROSCOPIC - Abnormal; Notable for the following components:    Glucose, Ur 500 (*)     Ketones, Urine TRACE (*)     All other components within normal limits   POCT GLUCOSE - Abnormal; Notable for the following components:    Meter Glucose 122 (*)     All other components within normal limits   MAGNESIUM   LIPASE   TROPONIN   LACTIC ACID, PLASMA   COVID-19   COMPREHENSIVE METABOLIC PANEL W/ REFLEX TO MG FOR LOW K   CBC WITH AUTO DIFFERENTIAL   TROPONIN   TROPONIN   POCT GLUCOSE   POCT GLUCOSE   POCT GLUCOSE   POCT GLUCOSE           RADIOLOGY:  Interpreted personally and by Radiologist.  XR CHEST PORTABLE   Final Result   Cardiomegaly with no evidence of failure or acute infiltrates. Calcified granuloma in the right lung base. No other radiographic evidence of acute cardiopulmonary disease.              ------------------------- NURSING NOTES AND VITALS REVIEWED ---------------------------   The nursing notes within the ED encounter and vital signs as below have been reviewed by myself. BP (!) 163/68   Pulse 70   Temp 99.4 °F (37.4 °C) (Oral)   Resp 16   Ht 5' 9\" (1.753 m)   Wt 246 lb 14.4 oz (112 kg)   SpO2 96%   BMI 36.46 kg/m²   Oxygen Saturation Interpretation: Normal    The patients available past medical records and past encounters were reviewed.         ------------------------------ ED COURSE/MEDICAL DECISION MAKING----------------------  Medications   sodium chloride flush 0.9 % injection 10 mL (10 mLs Intravenous Given 12/22/20 0915)   sodium chloride flush 0.9 % injection 10 mL (has no administration in time range)   potassium chloride (KLOR-CON M) extended release tablet 40 mEq (has no administration in time range)     Or   potassium bicarb-citric acid (EFFER-K) effervescent tablet 40 mEq (has no administration in time range)     Or   potassium chloride 10 mEq/100 mL IVPB (Peripheral Line) (has no administration in time range)   enoxaparin (LOVENOX) injection 40 mg (40 mg Subcutaneous Given 12/22/20 0915)   acetaminophen (TYLENOL) tablet 650 mg (has no administration in time range)     Or   acetaminophen (TYLENOL) suppository 650 mg (has no administration in time range)   aspirin EC tablet 81 mg (has no administration in time range)   atorvastatin (LIPITOR) tablet 40 mg (has no administration in time range)   latanoprost (XALATAN) 0.005 % ophthalmic solution 1 drop (has no administration in time range)   metoprolol tartrate (LOPRESSOR) tablet 50 mg (50 mg Oral Given 12/22/20 0915)   mirabegron (MYRBETRIQ) extended release tablet 25 mg (has no administration in time range)   pantoprazole (PROTONIX) tablet 40 mg (40 mg Oral Given 20 0915)   insulin lispro (HUMALOG) injection vial 0-6 Units (0 Units Subcutaneous Not Given 20 0847)   insulin lispro (HUMALOG) injection vial 0-3 Units (has no administration in time range)   glucose (GLUTOSE) 40 % oral gel 15 g (has no administration in time range)   dextrose 50 % IV solution (has no administration in time range)   glucagon (rDNA) injection 1 mg (has no administration in time range)   dextrose 5 % solution (has no administration in time range)   0.9 % sodium chloride bolus (0 mLs Intravenous Stopped 20 0932)       Counseling: The emergency provider has spoken with the patient and discussed todays results, in addition to providing specific details for the plan of care and counseling regarding the diagnosis and prognosis. Questions are answered at this time and they are agreeable with the plan. ED Course/Medical Decision Makin y.o. male here with diarrhea. On initial evaluation denies any associated symptoms. Benign abdominal exam. Initial workup reassuring, however patient was later found to have fallen onto the ground after standing up to use the urinal. Witnessed by staff, no head trauma or LOC. Afterward, noted very unstable shuffling gait and patient appeared to be pale and transiently confused. Positive orthostatics. Treating w/ IV fluids. Suspect dehydration 2/2 diarrhea. Admitted in stable condition for further management.       --------------------------------- IMPRESSION AND DISPOSITION ---------------------------------    IMPRESSION  1. Near syncope    2. Dehydration        DISPOSITION  Disposition: Admit to telemetry  Patient condition is stable    NOTE: This report was transcribed using voice recognition software.  Every effort was made to ensure accuracy; however, inadvertent computerized transcription errors may be present    Ximena Gann MD  Attending Emergency Physician          David Laura Jovel MD  12/22/20 7299

## 2020-12-22 NOTE — PROGRESS NOTES
Pharmacy Note    Kamron Lazaro was ordered Omega-3 Fatty Acids (FISH OIL). As per the [address] [place] [address], herbals and certain dietary supplements will be discontinued.   The herbal or dietary supplement may be continued after discharge from the hospital.  JHONY Wood [facility] [place]  12/22/2020  6:05 AM

## 2020-12-22 NOTE — CARE COORDINATION
Spoke with pt by phone; lives independently at home with his wife; uses cane. No [facility]/HHC history. denies any home going needs, plan is home ; will follow. Shelly Ohm.

## 2020-12-22 NOTE — ED NOTES
SBAR faxed to [facility]. Christofer Jacome for confirmation of receipt.      Zeina Call RN  12/22/20 [id number]

## 2020-12-22 NOTE — PROGRESS NOTES
Fátima Torres was ordered mirabegron [facility]) which is a nonformulary medication. The patient has indicated that the home supply of this medication will be brought in to the hospital for inpatient use. If the medication has not been administered by 1400 on the following day from the time the order was placed, a pharmacist will follow-up with the nurse of the patient to assess the capability of the patient to bring in the medication. If it is determined that the patient cannot supply the medication and it is not available to be dispensed from the pharmacy, a call will be placed to the ordering provider to discuss alternative options.     Priscila Richards, [address]  12/22/2020  6:03 AM No

## 2020-12-22 NOTE — PROGRESS NOTES
Notified [facility] for transportation to [facility] for heart cath with Dr. Noni Jimenez.  Procedure is at 1330, P/U is at 1230.

## 2020-12-22 NOTE — ED NOTES
Son, Mable Cabrera, can be reached at [phone] (home) or [phone] (cell).      Cristel Parnell RN  12/22/20 0020

## 2020-12-22 NOTE — PROGRESS NOTES
Physical Therapy    Facility/Department: [address] MED SURG/TELE  Initial Assessment    NAME: Mery Salas  : 1940  MRN: [id number]    Date of Service: 2020       REQUIRES PT FOLLOW UP: Yes       Patient Diagnosis(es): The primary encounter diagnosis was Near syncope. A diagnosis of Dehydration was also pertinent to this visit. has a past medical history of Arthritis, BPH (benign prostatic hypertrophy), CAD (coronary artery disease), Cervical spondylosis, Constipation, CTS (carpal tunnel syndrome), Diabetes mellitus ([facility] DM type 2 (diabetes mellitus, type 2) ([facility] Exogenous obesity, Glaucoma, History of EKG, Hypercholesterolemia, Hypertension, OA (osteoarthritis), Partial blindness, Polyp of colon, PONV (postoperative nausea and vomiting), Preoperative clearance, Vitamin B12 deficiency, Vitamin D deficiency, and Wears glasses. has a past surgical history that includes Diagnostic Cardiac Cath Lab Procedure ( approximately); eye surgery (Bilateral); Total shoulder arthroplasty (Left, ); Cataract removal with implant (Bilateral); Total knee arthroplasty (Right, 2015); Colonoscopy (10/31/2013); and joint replacement (Right, 2015). Evaluating Therapist: Jayashree Clay, PT     Referring Provider:  Dr. Ginna Dinh     rec      Room #:  710    DIAGNOSIS: near syncope   Pt with diarrhea   PRECAUTIONS:  Falls     Social:  Pt lives with wife  in a  1  floor plan   steps and 1 rails to enter. Prior to admission pt walked with  Rupert Sullivan . Has std walker . Sleeps on couch      Initial Evaluation  Date:  2020 Treatment      Short Term/ Long Term   Goals   Was pt agreeable to Eval/treatment? Yes      Does pt have pain?  Chronic L shoulder and R knee pain      Bed Mobility  Rolling:  NT   Supine to sit:  NT   Sit to supine:    Scooting:  Independent in sit    independent    Transfers Sit to stand:  SBA   Stand to sit:  SBA   Stand pivot:  SBA    independent    Ambulation    100 and 15 feet with  ww  with SBA    150  feet with  AAD  with  Independent        Stair negotiation: ascended and descended NT    4  steps with  1 rail with  SBA    LE ROM  WFL     LE strength  4/5      AM- PAC RAW score  18/ 24            Pt is alert and Oriented x  3     Balance:  SBA with use of ww   Bed/Chair alarm: yes      ASSESSMENT  Pt displays functional ability as noted in the objective portion of this evaluation. Treatment/Education:     Mobility as above. Attempted gait with no AD/HHA to simulate cane use, but pt unsteady. No LOB with use of ww , but required cues for safe ww use, especially with turns    Pt educated on fall risk,  Hand placement with transfers       Patient response to education:   Pt verbalized understanding Pt demonstrated skill Pt requires further education in this area   x  x       Comments:  Pt left  In chair after session, with call light in reach. Rehab potential is Good for reaching above PT goals. Pts/ family goals   1. None stated     Patient and or family understand(s) diagnosis, prognosis, and plan of care. -  Yes     PLAN  PT care will be provided in accordance with the objectives noted above. Whenever appropriate, clear delegation orders will be provided for nursing staff. Exercises and functional mobility practice will be used as well as appropriate assistive devices or modalities to obtain goals. Patient and family education will also be administered as needed. PLAN OF CARE:    Current Treatment Recommendations     [x] Strengthening     [] ROM   [x] Balance Training   [x] Endurance Training   [x] Transfer Training   [x] Gait Training   [] Stair Training   [] Positioning   [x] Safety and Education Training   [x] Patient/Caregiver Education   [] HEP  [] Other       Frequency of treatments will be 2-5x/week x  7 days.     Time in: 1230    Time out: 1247       Evaluation Time includes thorough review of current medical information, gathering information on past medical history/social history and prior level of function, completion of standardized testing/informal observation of tasks, assessment of data and education on plan of care and goals.     CPT codes:  [x] Low Complexity PT evaluation 21133  [] Moderate Complexity PT evaluation 87636  [] High Complexity PT evaluation 37247  [] PT Re-evaluation 22919  [] Gait training 21016  minutes  [] Therapeutic activities 96318  minutes  [] Therapeutic exercises 45025  minutes  [] Neuromuscular reeducation 74036  minutes       Alexandro Recio number:  PT [id number]

## 2020-12-22 NOTE — ED NOTES
Bed: 20  Expected date:   Expected time:   Means of arrival:   Comments:  EMS     Tempie Mcburney, RN  12/35/20 8013

## 2020-12-22 NOTE — PROGRESS NOTES
Occupational Therapy  OCCUPATIONAL THERAPY INITIAL EVALUATION      Date:2020  Patient Name: Joshua Bowden  MRN: [id number]  : 1940  Room: [address] [place] [address]    Referring Provider: Art Zavala DO    Evaluating OT: Richy Seymour OTR/L [id number]    AM-PAC Daily Activity Raw Score:     Recommended Adaptive Equipment: TBD    Diagnosis: near syncope. Pt presents to ED from home with prolonged diarrhea. Pt states no episodes of syncope or falls at home. Pertinent Medical History: CAD, DM, OA, glaucoma, HTN  Precautions:  falls     Home Living: Pt lives with wife in a single story home. Bathroom setup: walk in shower with grab bar, standard commode     Prior Level of Function: Independent with ADLs, Independent with IADLs; completed functional mobility with SPC. Has standard walker. Driving: No. Wife drives. Pain Level: no reported pain    Cognition: A&O: 4/4. Pt is alert and oriented but easily confused   Problem solving:  fair   Judgement/safety:  fair     Functional Assessment:   Initial Eval Status  Date: 20 Treatment session:  Short Term Goals     Feeding Independent     Grooming SBA  Standing sink level for hand hygiene  Independent   UB Dressing Set up  Independent   LB Dressing Mod A  Donning B socks seated EOB  Mod I    Bathing Mod A  Mod I   Toileting Min A  Use of grab bar for support in transfer and to maintain balance  Able to manage rosalva care with min assist to ensure cleanliness  Mod I   Bed Mobility  Supine to sit: SBA     Functional Transfers STS: SBA  Mod I   Functional Mobility SBA with [employer]  Household distance  Cues for obstacle navigation d/t visual deficits  Mod I during ADLs   Balance Sitting: fair plus    Standing: fair at [employer]     Activity Tolerance Fair minus  standing deborah x6-7 min with fair plus balance during self care tasks             Treatment: Patient educated on techniques for completion of ADL, safe functional transfers and functional mobility.   Patient required cues for follow through with proper hand/foot placement, pacing, safety and technique in bed mobility, functional transfers, functional mobility, toileting, grooming and LB dressing in preparation for maximum independence in all self care tasks. Hand Dominance: Right []  Left []   Strength ROM Additional Info:    RUE  4-/5 WFL good  and FMC/dexterity noted during ADL tasks     LUE 4-/5 WFL good  and FMC/dexterity noted during ADL tasks         Hearing: WFL   Vision: glaucoma, pt has difficulty navigating obstacles in room/bathroom environment.  Able to identify and interact with objects within immediate environment  Sensation:  No c/o numbness or tingling   Tone: WFL   Edema: none                             Long Term Goal (1-3 wks): Pt will maximize functional performance in all self care tasks/functional transfers with good follow through of all trained techniques for safe transition to next level of care    Assessment of current deficits   Functional mobility [x]  ADLs [x] Strength [x]  Cognition []  Functional transfers  [x] IADLs [x] Safety Awareness [x]  Endurance [x]  Fine Motor Coordination [] Balance [x] Vision/perception [] Sensation []   Gross Motor Coordination [] ROM [] Delirium []                  Motor Control []    Plan of Care: 1-3 days/week for 1-2 weeks PRN   [x]ADL retraining/adaptive techniques and AE recommendations to increase functional independence within precautions                    [x]Energy conservation techniques to improve tolerance for ADL/IADLs  [x]Functional transfer/mobility training/DME recommendations for increased independence, safety and fall prevention         [x]Patient/family education to increase safety and functional independence during daily routine          [x]Environmental modifications for safe mobility and completion of ADLs                             []Cognitive retraining to improve problem solving skills & safe participation in ADLs/IADLs []Sensory re-education techniques to improve extremity awareness, maintain skin integrity and improve hand function                             []Visual/Perceptual retraining to improve body awareness and safety during transfers and ADLs  []Splinting/positioning needs to maintain joint/skin integrity and contracture prevention  [x]Therapeutic activity to improve functional performance during ADLs                                        [x]Therapeutic exercise to improve tolerance and functional strength for ADLs   [x]Balance retraining/tolerance tasks for facilitation of postural control with dynamic challenges during ADLs  []Neuromuscular re-education to facilitate righting/equilibrium reactions, midline orientation, scapular stability/mobility, normalize muscle tone and facilitate active functional movement                        []Delirium prevention/treatment    [x]Positioning to improve functional independence and decrease risk of skin breakdown  []Other:     Rehab Potential: Good for established goals     Patient/Family Goal: To get home. Patient and/or family were instructed on functional diagnosis, prognosis/goals and OT plan of care. Pt verbalized understanding. Upon arrival, patient supine in bed. At end of session, patient seated in reclining chair with call light and phone within reach, all lines and tubes intact. Pt would benefit from continued skilled OT to increase safety and independence with completion of ADL/IADL tasks for functional independence and quality of life.  Bed/chair alarm: ON    Low Evaluation [id number]  Time In: 1025   Time Out: 1043      Evaluation time includes thorough review of current medical information, gathering information on past medical history/social history and prior level of function, completion of standardized testing/informal observation of tasks, assessment of data, and development of POC/Goals    Christen Fu OTR/L  [id number]

## 2020-12-22 NOTE — CONSULTS
Inpatient Cardiology Consultation      Reason for Consult:  Presyncope     Consulting Physician: Dr Carlos Chen     Requesting Physician:  Angel Dao DO    Date of Consultation: 12/22/2020    HISTORY OF PRESENT ILLNESS:   Mr Lulu Davison is an 78-year-old male who previously follows with Dr Dorinda Martínez but more recently has established with Dr Devante Juares. Past medical history includes coronary artery disease s/p PTCA of the LCx 1994 with recent stress test 6/14/2020 normal myocardial perfusion, type 2 diabetes mellitus, hypertension, hyperlipidemia. Presented to [facility] 12/22/2020 with diarrhea x 2-3 weeks. VS: 99.4-73-16-96%-163/68  Labs: BC 9.6, H&H 13.5/40.0, platelets 478. K+ 3.8, BUN/SCR 16/1.2, glucose 2.0  Troponin 0.03, proBNP 849  COVID 19 negative   Urinalysis negative    He states that he has been having diarrhea for 2-3 weeks, watery without bloody residue. He denies any nausea or vomiting associated. He states that he is able to tolerate PO intake and has been drinking fluids at home. He has been ambulating his house without difficulty - according to the patient h is just slower than normal because he is \"old and partially blind. \" He denies any lightheadedness/dizziness, falls/LOC at home. He denies any chest pain, shortness of breath, orthopnea/PND. H denies fever, cough, or flu like symptoms. According to the ED note, patient was found to have fallen when getting up from the bed to use th urinal.     Please note: past medical records were reviewed per electronic medical record (EMR) - see detailed reports under Past Medical/ Surgical History. Past Medical and Surgical History:    1. Coronary artery disease  2. Cardiac catheterization 1994 (Dr. Eron Ron):  of RCA. 70% OM2, diffuse LAD disease and EF 55%. S/p PTCA of the LCX with 20% residual stenosis. 3. Lexiscan MPS 10/22/2015: Nonischemic. No WMA. EF 58%.    4. Lexiscan 6/14/2020 : Normal myocardial perfusion, ejection fraction and wall motion study.  5. Arthritis   6. Type 2 diabetes mellitus   7. Vitamin B12 deficiency   8. Macular Degeneration with partial blindness  9. Chronic RBBB  10. Hypertension  11. Hyperlipidemia  12. Osteoarthritis  13. S/p cataract removal, shoulder arthroplasty, total knee arthroplasty    Medications Prior to admit:  Prior to Admission medications    Medication Sig Start Date End Date Taking? Authorizing Provider   vitamin D (ERGOCALCIFEROL) 1.25 MG (31032 UT) CAPS capsule Take 1 capsule by mouth once a week for 12 doses 12/7/20 2/23/21  Jl Sousa MD   dapagliflozin (FARXIGA) 10 MG tablet TAKE 1 TABLET BY MOUTH EVERY DAY IN THE MORNING 12/4/20   Jl Sousa MD   metFORMIN (GLUCOPHAGE-XR) 500 MG extended release tablet TAKE 1 TABLET BY MOUTH THREE TIMES A DAY 12/4/20   Jl Sousa MD   valsartan (DIOVAN) 80 MG tablet Take 1 tablet by mouth daily 12/4/20 3/4/21  Jl Sousa MD   amLODIPine (NORVASC) 5 MG tablet TAKE 1 TABLET BY MOUTH EVERY DAY AT NIGHT 12/4/20   Jl Sousa MD   metoprolol tartrate (LOPRESSOR) 50 MG tablet TAKE 1 TABLET BY MOUTH EVERY DAY 12/4/20   Jl Sousa MD   atorvastatin (LIPITOR) 40 MG tablet TAKE 1 TABLET BY MOUTH EVERY DAY 12/4/20   Jl Sousa MD   triamcinolone (KENALOG) 0.1 % cream Apply topically 2 times daily Apply topically 2 times daily. 12/4/20   Jl Sousa MD   nystatin-triamcinolone [facility]) 984872-1.8 UNIT/GM-% cream Apply topically 2 times daily.  12/4/20   Jl Sousa MD   pantoprazole (PROTONIX) 40 MG tablet TAKE 1 TABLET BY MOUTH EVERY DAY BEFORE BREAKFAST 9/8/20   Jl Sousa MD   vitamin E 400 UNIT capsule Take 1 capsule by mouth daily 6/14/20   Jl Sousa MD   oxybutynin (DITROPAN XL) 15 MG extended release tablet Take 15 mg by mouth every other day    Historical Provider, MD   mirabegron (MYRBETRIQ) 25 MG TB24 Take 1 tablet by mouth three times a week Mon-Wed-& Friday 1/27/20   Jl Sousa MD   nystatin (MYCOSTATIN) 552316 UNIT/GM cream APPLY 3 TIMES A DAY 10/30/19   Historical Provider, MD   tamsulosin (FLOMAX) 0.4 MG capsule TAKE 1 CAPSULE BY MOUTH EVERYDAY AT BEDTIME 10/8/19   Historical Provider, MD   lisinopril (PRINIVIL;ZESTRIL) 10 MG tablet TAKE 1 TABLET BY MOUTH EVERY DAY 12/3/19   Raimundo Cassidy MD   tiZANidine (ZANAFLEX) 2 MG tablet Take 1 tablet by mouth 3 times daily as needed (muscle spasm) 8/20/19   Raimundo Cassidy MD   blood glucose test strips (FREESTYLE TEST STRIPS) strip Indications: freestyle test strips DX: E11.9 As needed.     Diagnosis is E 11.9 6/11/19   Raimundo Cassidy MD   FREESTYLE LANCETS MISC 1 each by Does not apply route daily Indications: in AM DX: E11.9 6/11/19   Raimundo Cassidy MD   Continuous Blood Gluc Sensor ([address] [place] [address]) MISC 1 Units by Does not apply route 3 times daily 9/6/18   Raimundo Cassidy MD   Continuous Blood Gluc  (FREESTYLE MAYELA READER) WALTER 1 Units by Does not apply route 3 times daily e11.9 9/6/18   Raimundo Cassidy MD   aspirin 81 MG EC tablet Take 81 mg by mouth nightly    Historical Provider, MD   Coenzyme Q10 (CO Q-10) 100 MG CAPS Take 100 mg by mouth nightly    Historical Provider, MD   Omega-3 Fatty Acids (FISH OIL) 300 MG CAPS Take 1 capsule by mouth Daily with supper    Historical Provider, MD   vitamin B-12 (CYANOCOBALAMIN) 1000 MCG tablet Take 1,000 mcg by mouth daily    Historical Provider, MD   Ascorbic Acid (VITAMIN C) 500 MG tablet Take 500 mg by mouth daily    Historical Provider, MD   latanoprost (XALATAN) 0.005 % ophthalmic solution Place 1 drop into the right eye nightly  9/23/15   Historical Provider, MD       Current Medications:    Current Facility-Administered Medications: sodium chloride flush 0.9 % injection 10 mL, 10 mL, Intravenous, 2 times per day  sodium chloride flush 0.9 % injection 10 mL, 10 mL, Intravenous, PRN  potassium chloride (KLOR-CON M) extended release tablet 40 mEq, 40 mEq, Oral, PRN **OR** potassium bicarb-citric acid (EFFER-K) effervescent tablet 40 mEq, 40 mEq, Oral, PRN **OR** potassium chloride 10 mEq/100 mL IVPB (Peripheral Line), 10 mEq, Intravenous, PRN  enoxaparin (LOVENOX) injection 40 mg, 40 mg, Subcutaneous, Daily  acetaminophen (TYLENOL) tablet 650 mg, 650 mg, Oral, Q6H PRN **OR** acetaminophen (TYLENOL) suppository 650 mg, 650 mg, Rectal, Q6H PRN  aspirin EC tablet 81 mg, 81 mg, Oral, Nightly  atorvastatin (LIPITOR) tablet 40 mg, 40 mg, Oral, Daily  latanoprost (XALATAN) 0.005 % ophthalmic solution 1 drop, 1 drop, Right Eye, Nightly  metoprolol tartrate (LOPRESSOR) tablet 50 mg, 50 mg, Oral, Daily  pantoprazole (PROTONIX) tablet 40 mg, 40 mg, Oral, QAM AC  insulin lispro (HUMALOG) injection vial 0-6 Units, 0-6 Units, Subcutaneous, TID WC  insulin lispro (HUMALOG) injection vial 0-3 Units, 0-3 Units, Subcutaneous, Nightly  glucose (GLUTOSE) 40 % oral gel 15 g, 15 g, Oral, PRN  dextrose 50 % IV solution, 12.5 g, Intravenous, PRN  glucagon (rDNA) injection 1 mg, 1 mg, Intramuscular, PRN  dextrose 5 % solution, 100 mL/hr, Intravenous, PRN    Allergies:  Ace inhibitors; Anesthetics, amide; and Vicodin [hydrocodone-acetaminophen]    Social History:   Lives in a two story home with his wife. Performs ADL independently without chest pain or shortness of breath. Has a walker for ambulation assistance when he goes out of the house. Does not require long term oxygen. Denies tobacco, alcohol or illicit drug use. Full code       Family History: This information was not obtained at this time as it is found noncontributory secondary to the patients advanced age. REVIEW OF SYSTEMS:     · Constitutional: Denies fevers, chills, night sweats, and fatigue  · HEENT: Denies headaches, nose bleeds, and blurred vision,oral pain, abscess or lesion. · Musculoskeletal: Denies falls, pain to BLE with ambulation and edema to BLE.   · Neurological: Denies dizziness and lightheadedness, numbness and tingling  · Cardiovascular: Denies chest pain, palpitations, and feelings of heart racing. · Respiratory: Denies orthopnea and PND, cough, AVERY  · Gastrointestinal: Denies heartburn, nausea/vomiting, and constipation, black/bloody, and tarry stools. + diarrhea   · Genitourinary: Denies dysuria and hematuria  · Hematologic: Denies excessive bruising or bleeding  · Lymphatic: Denies lumps and bumps to neck, axilla, breast, and groin      PHYSICAL EXAM:   BP (!) 160/70   Pulse 82   Temp 98.7 °F (37.1 °C) (Oral)   Resp 18   Ht 5' 9\" (1.753 m)   Wt 242 lb 6 oz (109.9 kg)   SpO2 97%   BMI 35.79 kg/m²   CONST:  Well developed,  male who appears his stated age. Awake, alert, cooperative, no apparent distress  HEENT:   Head- Normocephalic, atraumatic   Eyes- Conjunctivae pink, anicteric  Throat- Oral mucosa pink and moist  Neck-  No stridor, trachea midline, no jugular venous distention. CHEST: Chest symmetrical and non-tender to palpation. RESPIRATORY: Lung sounds clear throughout fields bilaterally. No wheeze or rhonchi noted. CARDIOVASCULAR:     No carotid bruit noted bilaterally   Heart Ausculation- Regular rate and rhythm, no murmur. PV: No lower extremity edema. No varicosities. ABDOMEN: Soft, non-tender to light palpation. Bowel sounds present. MS: Good muscle strength and tone. No atrophy or abnormal movements. : Deferred   SKIN: Warm and dry no statis dermatitis or ulcers   NEURO / PSYCH: Oriented to person, place and time. Speech clear and appropriate. Follows all commands.  Pleasant affect     DATA:    ECG: none performed   Tele strips:  SR HR 60s     Diagnostic:      Intake/Output Summary (Last 24 hours) at 12/22/2020 1229  Last data filed at 12/22/2020 0800  Gross per 24 hour   Intake --   Output 200 ml   Net -200 ml       Labs:   CBC:   Recent Labs     12/22/20 0119   WBC 9.6   HGB 13.5   HCT 40.0        BMP:   Recent Labs     12/22/20 0119      K 3.8   CO2 25   BUN 16   CREATININE 1.2   LABGLOM 58   CALCIUM

## 2020-12-23 PROBLEM — R55 NEAR SYNCOPE: Status: RESOLVED | Noted: 2020-01-01 | Resolved: 2020-01-01

## 2020-12-23 NOTE — CARE COORDINATION
Ivf; c-diff negative; plan is home ; no needs. labs ok; plan is home ; no needs. [place].

## 2020-12-23 NOTE — PROGRESS NOTES
Progress Note  Date:2020       [facility]:[id number]  Patient Name:Jacob Kowalski [id number]     YOB: 1940     Age:79 y.o. Patient seen for follow up of Presyncope. Patient denies any further episodes of diarrhea overnight. Patient denies any lightheadedness, dizziness, chest pains, shortness of breath, nausea, or vomiting. Subjective    Subjective:  Symptoms:  No shortness of breath, cough, chest pain, headache, chest pressure or diarrhea. Diet:  No nausea or vomiting. Review of Systems   Respiratory: Negative for cough and shortness of breath. Cardiovascular: Negative for chest pain. Gastrointestinal: Negative for diarrhea, nausea and vomiting. Objective         Vitals Last 24 Hours:  TEMPERATURE:  Temp  Av.1 °F (36.7 °C)  Min: 97.7 °F (36.5 °C)  Max: 98.7 °F (37.1 °C)  RESPIRATIONS RANGE: Resp  Av.3  Min: 16  Max: 18  PULSE OXIMETRY RANGE: SpO2  Av.3 %  Min: 95 %  Max: 97 %  PULSE RANGE: Pulse  Av.7  Min: 62  Max: 82  BLOOD PRESSURE RANGE: Systolic (27EPH), FVF:178 , Min:150 , OXT:208   ; Diastolic (76FTU), PYV:70, Min:63, Max:74    I/O (24Hr): Intake/Output Summary (Last 24 hours) at 2020 0541  Last data filed at 2020 0800  Gross per 24 hour   Intake --   Output 200 ml   Net -200 ml     Objective:  General Appearance:  Comfortable, well-appearing and in no acute distress. Vital signs: (most recent): Blood pressure (!) 178/77, pulse 74, temperature 97.7 °F (36.5 °C), temperature source Axillary, resp. rate 16, height 5' 9\" (1.753 m), weight 241 lb 12.8 oz (109.7 kg), SpO2 96 %. Lungs:  Normal effort and normal respiratory rate. Breath sounds clear to auscultation. No rales or rhonchi. Heart: Normal rate. Regular rhythm. S1 normal and S2 normal.  No friction rub. Abdomen: Abdomen is soft and non-distended. Bowel sounds are normal.   There is no abdominal tenderness. There is no rebound tenderness. There is no guarding. Extremities: Normal range of motion. There is no dependent edema. Skin:  Warm and dry. Labs/Imaging/Diagnostics    Labs:  CBC:  Recent Labs     12/22/20 0119 12/23/20  0400   WBC 9.6 6.1   RBC 4.22 4.20   HGB 13.5 13.2   HCT 40.0 40.4   MCV 94.8 96.2   RDW 14.1 14.2    158     CHEMISTRIES:  Recent Labs     12/22/20 0119 12/23/20  0400    135   K 3.8 4.2   CL 99 101   CO2 25 25   BUN 16 13   CREATININE 1.2 1.1   GLUCOSE 168* 121*   MG 1.7  --      PT/INR:No results for input(s): PROTIME, INR in the last 72 hours. APTT:No results for input(s): APTT in the last 72 hours. LIVER PROFILE:  Recent Labs     12/22/20 0119 12/23/20  0400   AST 14 25   ALT 10 12   BILITOT 0.5 0.5   ALKPHOS 88 82       Imaging Last 24 Hours:  Echo Complete    Result Date: 12/22/2020  Transthoracic Echocardiography Report (TTE)  Demographics   Patient Name   [facility]      Gender            Male                 [address] [facility] Record [id number]       Room Number       7014  Number   Account #      [de-identified]      Procedure Date    12/22/2020   Corporate ID                  Ordering          Aquilino Bess MD                                Physician   Accession      [id number]     Referring         Woody Garzon MD  Number                        Physician   Date of Birth  1940     Sonographer       Daniel Dallas   Age            78 year(s)     Interpreting      [address] [place] [address]                                Physician         Physician Cardiology                                                  Aquilino Bess MD                                 Any Other  Procedure Type of Study   TTE procedure:Echo Complete W/Doppler & Color Flow. Procedure Date Date: 12/22/2020 Start: 02:37 PM Study Location: Portable Technical Quality: Poor visualization due to body habitus. Indications:LV function and Valvular heart disease. Patient Status: Routine Contrast Medium: Definity.  Height: 69 inches Weight: 242 pounds BSA: 2.24 m^2 BMI: 35.74 kg/m^2 HR: 61 bpm BP: 145/65 mmHg  Findings   Left Ventricle  Normal left ventricle size and systolic function. Ejection fraction is visually estimated at 60-65%. No regional wall motion abnormalities seen. Normal left ventricle wall thickness. Indeterminate diastolic function. Right Ventricle  Normal right ventricular size and function. TAPSE 21 mm. Left Atrium  The left atrium is moderately dilated. Right Atrium  Mildly enlarged right atrium size. Mitral Valve  Mild mitral annular calcification. No evidence of mitral valve stenosis. No systolic mitral regurgitation noted. Tricuspid Valve  The tricuspid valve was not well visualized. Unable to estimate PA systolic pressure. Aortic Valve  The aortic valve appears mildly sclerotic. The aortic valve is trileaflet. There is mild aortic stenosis with valve area of 1.9 sq cm. No evidence of aortic valve regurgitation. Pulmonic Valve  The pulmonic valve was not well visualized. Physiologic and/or trace pulmonic regurgitation present. No evidence of pulmonic valve stenosis. Pericardial Effusion  No evidence for hemodynamically significant pericardial effusion. Pleural Effusion  No evidence of pleural effusion. Aorta  Normal aortic root and ascending aorta. Miscellaneous  Inferior Vena Cava not well visualized. Conclusions   Summary  Normal left ventricle size and systolic function. Ejection fraction is visually estimated at 60-65%. No regional wall motion abnormalities seen. Normal left ventricle wall thickness. Indeterminate diastolic function. The left atrium is moderately dilated. Normal right ventricular size and function. TAPSE 21 mm. No systolic mitral regurgitation noted. There is mild aortic stenosis with valve area of 1.9 sq cm. Unable to estimate PA systolic pressure. No evidence for hemodynamically significant pericardial effusion. No previous echo for comparison.   Technically difficult examination due to body habitus. Definity echo  contrast was used to delineate endocardial borders. Signature   ----------------------------------------------------------------  Electronically signed by Princess Stovall MD(Interpreting  physician) on 12/22/2020 06:24 PM  ----------------------------------------------------------------  M-Mode/2D Measurements & Calculations   LV Diastolic   LV Systolic Dimension: 3.4   AV Cusp Separation: 1.5 cmLA  Dimension: 5   cm                           Dimension: 4.9 cmAO Root  cm             LV Volume Diastolic: 517.4   Dimension: 3.5 cm  LV FS:32 %     ml  LV PW          LV Volume Systolic: 21.2 ml  Diastolic: 1   LV EDV/LV EDV Index: 117.2  cm             ml/52 ml/m^2LV ESV/LV ESV    RV Diastolic Dimension: 3.3 cm  LV PW          Index: 47.7 TY/56BC/ m^2  Systolic: 1.3  EF Calculated: 59.3 %        LA/Aorta: 1.4  cm             LV Mass Index: 76 l/min*m^2  Ascending Aorta: 3 cm  Septum                                      LA volume/Index: 95 ml  Diastolic: 0.9                              /42. 37ml/m^2  cm             LVOT: 2 cm                   RA Area: 24.9 cm^2  Septum  Systolic: 1.3  cm  CO: 5.14 l/min  CI: 1.8  l/m*m^2  LV Mass:  169.92 g  Doppler Measurements & Calculations   MV Peak E-Wave: 0.68 AV Peak Velocity: 1.67 m/s LVOT Peak Velocity: 0.91  m/s                  AV Peak Gradient: 11.14    m/s  MV Peak A-Wave: 0.83 mmHg                       LVOT Mean Velocity: 0.66  m/s                  AV Mean Velocity: 1.26 m/s m/s  MV E/A Ratio: 0.82   AV Mean Gradient: 6.8 mmHg LVOT Peak Gradient: 3.3  MV Peak Gradient:    AV VTI: 34.2 cm            mmHgLVOT Mean Gradient: 2  2.7 mmHg             AV Area (Continuity):1.94  mmHg  MV Mean Gradient:    cm^2  0.8 mmHg  MV Mean Velocity:    LVOT VTI: 21.1 cm  0.41 m/s             IVRT: 60 msec  MV Deceleration  Time: 202.1 msec     Pulm.  Vein A Reversal      PV Peak Velocity: 0.76 m/s  MV P1/2t: 57.7 msec  Duration:106.1 msec PV Peak Gradient: 2.33  MVA by PHT:3.81 cm^2 Pulm. Vein A Reversal      mmHg  MV Area              Velocity:0.21 m/s          PV Mean Velocity: 0.52 m/s  (continuity): 2.5                               PV Mean Gradient: 1.3 mmHg  cm^2  MV E' Septal  Velocity: 0.07 m/s  MV E' Lateral  Velocity: 9 m/s  [email or web address] [id number]    Xr Chest Portable    Result Date: 12/22/2020  EXAMINATION: ONE XRAY VIEW OF THE CHEST 12/22/2020 12:15 am COMPARISON: June 13 HISTORY: ORDERING SYSTEM PROVIDED HISTORY: fatigue TECHNOLOGIST PROVIDED HISTORY: Reason for exam:->fatigue FINDINGS: Cardiac silhouette is enlarged and unchanged. No airspace consolidation or pleural effusions. Pulmonary vasculature within normal limits. Calcified granuloma in the right lung base. Partially visualized left humeral prosthesis. Cardiomegaly with no evidence of failure or acute infiltrates. Calcified granuloma in the right lung base. No other radiographic evidence of acute cardiopulmonary disease. Assessment//Plan           Hospital Problems           Last Modified POA    Near syncope 12/22/2020 Yes        Assessment & Plan  1. Presyncope   2. Orthostatic Hypotension   3. New onset diarrhea   4. Diabetes Mellitus Type II   5. Hypertension    Diarrhea improved with holding medication. Await C. diff result.  Check Orthostatics this am. Likely discharge this pm.     Sierra Velazco DO      Electronically signed by Sierra Velazco DO on 12/23/20 at 5:41 AM EST

## 2020-12-23 NOTE — PROGRESS NOTES
INPATIENT CARDIOLOGY FOLLOW-UP    Name: Raffy Dennis    Age: 78 y.o. Date of Admission: 12/22/2020 12:07 AM    Date of Service: 12/23/2020    Chief Complaint: Follow-up for near syncope/presyncope    Interim History:  No new overnight cardiac complaints. Currently with no complaints of CP, SOB, palpitations, dizziness, or lightheadedness. SR on telemetry. Review of Systems:   Cardiac: As per HPI  General: No fever, chills  Pulmonary: As per HPI  HEENT: No visual disturbances, difficult swallowing  GI: No nausea, vomiting  Endocrine: No thyroid disease or DM  Musculoskeletal: DOWELL x 4, no focal motor deficits  Skin: Intact, no rashes  Neuro/Psych: No headache or seizures    Problem List:  Patient Active Problem List   Diagnosis    Hypertension    Hypercholesterolemia    CAD (coronary artery disease)    Benign prostatic hyperplasia    CTS (carpal tunnel syndrome)    Cervical spondylosis    DM type 2 (diabetes mellitus, type 2) (HCC)    Exogenous obesity    OA (osteoarthritis)    Polyp of colon    Vitamin B12 deficiency    Vitamin D deficiency    Morbidly obese (HCC)    Asthmatic bronchitis, mild intermittent, uncomplicated    Chest pain    Near syncope       Allergies: Allergies   Allergen Reactions    Ace Inhibitors      LIZZ    Anesthetics, Amide      Hospitalization after use.     Vicodin [Hydrocodone-Acetaminophen] Other (See Comments)     Abdominal pain constipation        Current Medications:  Current Facility-Administered Medications   Medication Dose Route Frequency Provider Last Rate Last Admin    sodium chloride flush 0.9 % injection 10 mL  10 mL Intravenous 2 times per day Ro Ramires DO   10 mL at 12/22/20 2027    sodium chloride flush 0.9 % injection 10 mL  10 mL Intravenous PRN Ro Ramires, DO        potassium chloride (KLOR-CON M) extended release tablet 40 mEq  40 mEq Oral PRN Ro Ramires DO        Or    potassium bicarb-citric acid (EFFER-K) effervescent tablet 40 mEq  40 mEq Oral PRN Ro E Ike, DO        Or    potassium chloride 10 mEq/100 mL IVPB (Peripheral Line)  10 mEq Intravenous PRN Ro E Ike, DO        enoxaparin (LOVENOX) injection 40 mg  40 mg Subcutaneous Daily Ro E Ike, DO   40 mg at 12/23/20 0734    acetaminophen (TYLENOL) tablet 650 mg  650 mg Oral Q6H PRN Ro E Ike, DO        Or    acetaminophen (TYLENOL) suppository 650 mg  650 mg Rectal Q6H PRN Ro E Ike, DO        aspirin EC tablet 81 mg  81 mg Oral Nightly Ro LAO Ike, DO   81 mg at 12/22/20 2027    atorvastatin (LIPITOR) tablet 40 mg  40 mg Oral Daily Ro LAO Ike, DO   40 mg at 12/22/20 2027    latanoprost (XALATAN) 0.005 % ophthalmic solution 1 drop  1 drop Right Eye Nightly Ro LAO Ike, DO   1 drop at 12/22/20 2027    metoprolol tartrate (LOPRESSOR) tablet 50 mg  50 mg Oral Daily Ro LAO Ike, DO   50 mg at 12/23/20 0734    pantoprazole (PROTONIX) tablet 40 mg  40 mg Oral QAM AC Ro LAO Ike, DO   40 mg at 12/23/20 0551    insulin lispro (HUMALOG) injection vial 0-6 Units  0-6 Units Subcutaneous TID WC Ro LAO Ike, DO   1 Units at 12/23/20 1052    insulin lispro (HUMALOG) injection vial 0-3 Units  0-3 Units Subcutaneous Nightly Ro LAO Ike, DO        glucose (GLUTOSE) 40 % oral gel 15 g  15 g Oral PRN Ro LAO Ike, DO        dextrose 50 % IV solution  12.5 g Intravenous PRN Ro E Ike, DO        glucagon (rDNA) injection 1 mg  1 mg Intramuscular PRN Ro LAO Ike, DO        dextrose 5 % solution  100 mL/hr Intravenous PRN Ro LAO Ike, DO        0.9 % sodium chloride infusion   Intravenous Continuous Ro Ramires, DO 75 mL/hr at 12/22/20 1848 New Bag at 12/22/20 1848      dextrose      sodium chloride 75 mL/hr at 12/22/20 1848       Physical Exam:  BP (!) 178/77   Pulse 74   Temp 97.7 °F (36.5 °C) (Axillary)   Resp 16   Ht 5' 9\" (1.753 m)   Wt 241 lb 12.8 oz (109.7 kg) SpO2 96%   BMI 35.71 kg/m²   Wt Readings from Last 3 Encounters:   12/23/20 241 lb 12.8 oz (109.7 kg)   12/04/20 243 lb (110.2 kg)   11/11/20 228 lb (103.4 kg)     Appearance: Awake, alert, no acute respiratory distress  Skin: Intact, no rash  Head: Normocephalic, atraumatic  Eyes: EOMI, no conjunctival erythema  ENMT: No pharyngeal erythema, MMM, no rhinorrhea  Neck: Supple, no elevated JVP, no carotid bruits  Lungs: Clear to auscultation bilaterally. No wheezes, rales, or rhonchi. Cardiac: Regular rate and rhythm, +S1S2, no murmurs apparent  Abdomen: Soft, nontender, +bowel sounds  Extremities: Moves all extremities x 4, no lower extremity edema  Neurologic: No focal motor deficits apparent, normal mood and affect  Peripheral Pulses: Intact posterior tibial pulses bilaterally    Intake/Output:  No intake or output data in the 24 hours ending 12/23/20 1311  No intake/output data recorded.     Laboratory Tests:  Recent Labs     12/22/20 0119 12/23/20  0400    135   K 3.8 4.2   CL 99 101   CO2 25 25   BUN 16 13   CREATININE 1.2 1.1   GLUCOSE 168* 121*   CALCIUM 9.1 9.0     Lab Results   Component Value Date    MG 1.7 12/22/2020     Recent Labs     12/22/20  0119 12/23/20  0400   ALKPHOS 88 82   ALT 10 12   AST 14 25   PROT 7.1 6.8   BILITOT 0.5 0.5   LABALBU 3.9 3.6     Recent Labs     12/22/20  0119 12/23/20  0400   WBC 9.6 6.1   RBC 4.22 4.20   HGB 13.5 13.2   HCT 40.0 40.4   MCV 94.8 96.2   MCH 32.0 31.4   MCHC 33.8 32.7   RDW 14.1 14.2    158   MPV 10.6 10.9     Lab Results   Component Value Date    CKTOTAL 63 02/01/2013    CKMB 1.1 02/01/2013    TROPONINI 0.03 12/22/2020    TROPONINI 0.03 12/22/2020    TROPONINI <0.01 06/13/2020     Lab Results   Component Value Date    INR 1.0 09/06/2016    INR 1.1 12/28/2015    PROTIME 11.6 09/06/2016    PROTIME 12.1 12/28/2015     No results found for: TSHFT4, TSH  Lab Results   Component Value Date    LABA1C 7.0 (H) 12/04/2020     No results found for: EAG  Lab Results   Component Value Date    CHOL 147 12/04/2020    CHOL 146 06/15/2020    CHOL 144 12/06/2019     Lab Results   Component Value Date    TRIG 173 (H) 12/04/2020    TRIG 177 (H) 06/15/2020    TRIG 128 12/06/2019     Lab Results   Component Value Date    HDL 37 12/04/2020    HDL 33 (L) 06/15/2020    HDL 40 (L) 12/06/2019     Lab Results   Component Value Date    LDLCALC 75 12/04/2020    LDLCALC 86 06/15/2020    LDLCALC 82 12/06/2019     Lab Results   Component Value Date    LABVLDL 35 12/04/2020    LABVLDL 44 03/07/2019    LABVLDL 33 12/06/2018     Lab Results   Component Value Date    CHOLHDLRATIO 4.4 06/15/2020    CHOLHDLRATIO 3.6 12/06/2019    CHOLHDLRATIO 3.8 09/20/2019       Cardiac Tests:  Telemetry findings reviewed: SR at rate 80s, no new tachy/bradyarrhythmias      EKG: Normal sinus rhythm, inferior MI age undetermined, right bundle branch block, abnormal EKG.    Vitals and labs were reviewed: Blood pressure 178/77, with heart rate of 74  Repeat orthostatic blood pressures are normal.     Labs-BMP normal except for a glucose of 121 proBNP 848 troponins 0.03x2 liver function normal CBC normal.     Chest x-ray: Cardiomegaly with no evidence of failure or infiltrate     Lexiscan nuclear stress test-6/13/2020: Normal myocardial perfusion, normal wall motion, LVEF 57% with normal LV function. TTE-11/22/2020:  Normal left ventricle size and systolic function. Ejection fraction is visually estimated at 60-65%. No regional wall motion abnormalities seen. Normal left ventricle wall thickness. Indeterminate diastolic function. The left atrium is moderately dilated. Normal right ventricular size and function. TAPSE 21 mm. No systolic mitral regurgitation noted. There is mild aortic stenosis with valve area of 1.9 sq cm. Unable to estimate PA systolic pressure. No evidence for hemodynamically significant pericardial effusion. No previous echo for comparison.    Technically difficult

## 2020-12-23 NOTE — DISCHARGE SUMMARY
Discharge Summary     Patient ID:  Bhavna Colby  [id number]  78 y.o. 1940 male  Kaylin Hubbard MD        Admit date: 12/22/2020    Discharge date and time:  12/23/2020  1:58 PM      Activity level: As tolerated  Diet:Diabetic  Disposition:Home  Condition on Discharge: Stable    Admit Diagnoses:   Patient Active Problem List   Diagnosis    Hypertension    Hypercholesterolemia    CAD (coronary artery disease)    Benign prostatic hyperplasia    CTS (carpal tunnel syndrome)    Cervical spondylosis    DM type 2 [facility]diabetes mellitus[facility] type 2) ([facility])    Exogenous obesity    OA (osteoarthritis)    Polyp of colon    Vitamin B12 deficiency    Vitamin D deficiency    Morbidly obese (HCC)    Asthmatic bronchitis, mild intermittent, uncomplicated    Chest pain       Discharge Diagnoses: Active Problems:    * No active hospital problems. *  Resolved Problems:    Near syncope  1. Presyncope   2. Orthostatic Hypotension   3. New onset diarrhea   4. Diabetes Mellitus Type II   5. Hypertension      Consults:  IP CONSULT TO INTERNAL MEDICINE  IP CONSULT TO CARDIOLOGY    Hospital Course: Patient is an 78year old male who presented to the er due to 2 week history of diarrhea and generalized weakness. He had a questionable presyncopal episode in er. He was found to be orthostatic. He was seen by Cardiology. EKG and cardiac enzyme negative. He had an echo which was normal. His bp meds, metformin and SGLT2 inhibitor were held. Diarrhea resolved. C diff was negative. He was able to ambulate and feeling well prior to discharge. Patient discharged home in stable condition. To follow up with Dr. Naif Jones in 1 week. Discharge Exam:    General Appearance: Comfortable, well-appearing and in no acute distress. Vital signs: (most recent): Blood pressure (!) 178/77, pulse 74, temperature 97.7 °F (36.5 °C), temperature source Axillary, resp. rate 16, height 5' 9\" (1.753 m), weight 241 lb 12.8 oz (109.7 kg), SpO2 96 %. Lungs: Normal effort and normal respiratory rate. Breath sounds clear to auscultation. No rales or rhonchi. Heart: Normal rate. Regular rhythm. S1 normal and S2 normal. No friction rub. Abdomen: Abdomen is soft and non-distended. Bowel sounds are normal. There is no abdominal tenderness. There is no rebound tenderness. There is no guarding. Extremities: Normal range of motion. There is no dependent edema. Skin: Warm and dry. I/O last 3 completed shifts:  In: -   Out: 200 [Urine:200]  No intake/output data recorded. LABS:  Recent Labs     12/22/20  0119 12/23/20  0400    135   K 3.8 4.2   CL 99 101   CO2 25 25   BUN 16 13   CREATININE 1.2 1.1   GLUCOSE 168* 121*   CALCIUM 9.1 9.0       Recent Labs     12/22/20 0119 12/23/20  0400   WBC 9.6 6.1   RBC 4.22 4.20   HGB 13.5 13.2   HCT 40.0 40.4   MCV 94.8 96.2   MCH 32.0 31.4   MCHC 33.8 32.7   RDW 14.1 14.2    158   MPV 10.6 10.9       Recent Labs     12/23/20  1051   GLUMET 163*       Imaging:  Echo Complete    Result Date: 12/22/2020  Transthoracic Echocardiography Report (TTE)  Demographics   Patient Name   [facility]      Gender            Male                 Sharalyn Friday   Medical Record [id number]       Room Number       9885  Number   Account #      [de-identified]      Procedure Date    12/22/2020   Corporate ID                  Ordering          Rusty Betts MD                                Physician   Accession      [id number]     Referring         Maggie Fuentes MD  Number                        Physician   Date of Birth  1940     Sonographer       Do Michaelalvino   Age            78 year(s)     Interpreting      [address] [place] [address]                                Physician         Physician Cardiology                                                  Rusty Betts MD                                 Any Other  Procedure Type of Study   TTE procedure:Echo Complete W/Doppler & Color Flow.   Procedure Date Date: 12/22/2020 Start: 02:37 PM Study Location: Portable Technical Quality: Poor visualization due to body habitus. Indications:LV function and Valvular heart disease. Patient Status: Routine Contrast Medium: Definity. Height: 69 inches Weight: 242 pounds BSA: 2.24 m^2 BMI: 35.74 kg/m^2 HR: 61 bpm BP: 145/65 mmHg  Findings   Left Ventricle  Normal left ventricle size and systolic function. Ejection fraction is visually estimated at 60-65%. No regional wall motion abnormalities seen. Normal left ventricle wall thickness. Indeterminate diastolic function. Right Ventricle  Normal right ventricular size and function. TAPSE 21 mm. Left Atrium  The left atrium is moderately dilated. Right Atrium  Mildly enlarged right atrium size. Mitral Valve  Mild mitral annular calcification. No evidence of mitral valve stenosis. No systolic mitral regurgitation noted. Tricuspid Valve  The tricuspid valve was not well visualized. Unable to estimate PA systolic pressure. Aortic Valve  The aortic valve appears mildly sclerotic. The aortic valve is trileaflet. There is mild aortic stenosis with valve area of 1.9 sq cm. No evidence of aortic valve regurgitation. Pulmonic Valve  The pulmonic valve was not well visualized. Physiologic and/or trace pulmonic regurgitation present. No evidence of pulmonic valve stenosis. Pericardial Effusion  No evidence for hemodynamically significant pericardial effusion. Pleural Effusion  No evidence of pleural effusion. Aorta  Normal aortic root and ascending aorta. Miscellaneous  Inferior Vena Cava not well visualized. Conclusions   Summary  Normal left ventricle size and systolic function. Ejection fraction is visually estimated at 60-65%. No regional wall motion abnormalities seen. Normal left ventricle wall thickness. Indeterminate diastolic function. The left atrium is moderately dilated. Normal right ventricular size and function. TAPSE 21 mm.   No systolic mitral regurgitation noted.  There is mild aortic stenosis with valve area of 1.9 sq cm. Unable to estimate PA systolic pressure. No evidence for hemodynamically significant pericardial effusion. No previous echo for comparison. Technically difficult examination due to body habitus. Definity echo  contrast was used to delineate endocardial borders. Signature   ----------------------------------------------------------------  Electronically signed by Woody Toro MD(Interpreting  physician) on 12/22/2020 06:24 PM  ----------------------------------------------------------------  M-Mode/2D Measurements & Calculations   LV Diastolic   LV Systolic Dimension: 3.4   AV Cusp Separation: 1.5 cmLA  Dimension: 5   cm                           Dimension: 4.9 cmAO Root  cm             LV Volume Diastolic: 701.7   Dimension: 3.5 cm  LV FS:32 %     ml  LV PW          LV Volume Systolic: 92.8 ml  Diastolic: 1   LV EDV/LV EDV Index: 117.2  cm             ml/52 ml/m^2LV ESV/LV ESV    RV Diastolic Dimension: 3.3 cm  LV PW          Index: 47.7 HA/84BN/ m^2  Systolic: 1.3  EF Calculated: 59.3 %        LA/Aorta: 1.4  cm             LV Mass Index: 76 l/min*m^2  Ascending Aorta: 3 cm  Septum                                      LA volume/Index: 95 ml  Diastolic: 0.9                              /42. 37ml/m^2  cm             LVOT: 2 cm                   RA Area: 24.9 cm^2  Septum  Systolic: 1.3  cm  CO: 9.71 l/min  CI: 1.8  l/m*m^2  LV Mass:  169.92 g  Doppler Measurements & Calculations   MV Peak E-Wave: 0.68 AV Peak Velocity: 1.67 m/s LVOT Peak Velocity: 0.91  m/s                  AV Peak Gradient: 11.14    m/s  MV Peak A-Wave: 0.83 mmHg                       LVOT Mean Velocity: 0.66  m/s                  AV Mean Velocity: 1.26 m/s m/s  MV E/A Ratio: 0.82   AV Mean Gradient: 6.8 mmHg LVOT Peak Gradient: 3.3  MV Peak Gradient:    AV VTI: 34.2 cm            mmHgLVOT Mean Gradient: 2  2.7 mmHg             AV Area (Continuity):1.94  mmHg  MV Mean MOUTH EVERY DAY  Qty: 90 tablet, Refills: 1      atorvastatin (LIPITOR) 40 MG tablet TAKE 1 TABLET BY MOUTH EVERY DAY  Qty: 90 tablet, Refills: 1      pantoprazole (PROTONIX) 40 MG tablet TAKE 1 TABLET BY MOUTH EVERY DAY BEFORE BREAKFAST  Qty: 90 tablet, Refills: 1    Comments: PATIENT WOULD LIKE A REFILL. THAN YOU      vitamin E 400 UNIT capsule Take 1 capsule by mouth daily  Qty: 30 capsule, Refills: 3      mirabegron (MYRBETRIQ) 25 MG TB24 Take 1 tablet by mouth three times a week Mon-Wed-& Friday  Qty: 35 tablet, Refills: 0    Comments: Pt was given samples during wife's OV on 1/21/2020. Lot # : [id number]  Exp : December 2021      tamsulosin (FLOMAX) 0.4 MG capsule TAKE 1 CAPSULE BY MOUTH EVERYDAY AT BEDTIME  Refills: 3      blood glucose test strips (FREESTYLE TEST STRIPS) strip Indications: freestyle test strips DX: E11.9 As needed.     Diagnosis is E 11.9  Qty: 100 each, Refills: 2      FREESTYLE LANCETS MISC 1 each by Does not apply route daily Indications: in AM DX: E11.9  Qty: 100 each, Refills: 3      Continuous Blood Gluc Sensor ([address] [place] [address]) MISC 1 Units by Does not apply route 3 times daily  Qty: 1 each, Refills: 0      Continuous Blood Gluc  (FREESTYLE MAYELA READER) WALTER 1 Units by Does not apply route 3 times daily e11.9  Qty: 1 Device, Refills: 0      aspirin 81 MG EC tablet Take 81 mg by mouth nightly      Coenzyme Q10 (CO Q-10) 100 MG CAPS Take 100 mg by mouth nightly      Omega-3 Fatty Acids (FISH OIL) 300 MG CAPS Take 1 capsule by mouth Daily with supper      vitamin B-12 (CYANOCOBALAMIN) 1000 MCG tablet Take 1,000 mcg by mouth daily      Ascorbic Acid (VITAMIN C) 500 MG tablet Take 500 mg by mouth daily      latanoprost (XALATAN) 0.005 % ophthalmic solution Place 1 drop into the right eye nightly   Refills: 4         STOP taking these medications       metFORMIN (GLUCOPHAGE-XR) 500 MG extended release tablet Comments:   Reason for Stopping:         valsartan (DIOVAN) 80 MG tablet Comments:   Reason for Stopping:         triamcinolone (KENALOG) 0.1 % cream Comments:   Reason for Stopping:         nystatin-triamcinolone (MYCOLOG II) 245906-8.1 UNIT/GM-% cream Comments:   Reason for Stopping:         oxybutynin (DITROPAN XL) 15 MG extended release tablet Comments:   Reason for Stopping:         nystatin (MYCOSTATIN) 740206 UNIT/GM cream Comments:   Reason for Stopping:         lisinopril (PRINIVIL;ZESTRIL) 10 MG tablet Comments:   Reason for Stopping:         tiZANidine (ZANAFLEX) 2 MG tablet Comments:   Reason for Stopping:                 Note that more than 30 minutes was spent in preparing discharge papers, discussing discharge with patient, medication review, etc.      Signed:    Binu Lam DO    Electronically signed by Binu Lam DO on 12/23/2020 at 1:58 PM

## 2021-01-01 ENCOUNTER — CARE COORDINATION (OUTPATIENT)
Dept: CASE MANAGEMENT | Age: 81
End: 2021-01-01

## 2021-01-01 ENCOUNTER — CARE COORDINATION (OUTPATIENT)
Dept: CARE COORDINATION | Age: 81
End: 2021-01-01

## 2021-01-01 ENCOUNTER — APPOINTMENT (OUTPATIENT)
Dept: GENERAL RADIOLOGY | Age: 81
DRG: 812 | End: 2021-01-01
Payer: MEDICARE

## 2021-01-01 ENCOUNTER — TELEPHONE (OUTPATIENT)
Dept: CARDIOLOGY CLINIC | Age: 81
End: 2021-01-01

## 2021-01-01 ENCOUNTER — HOSPITAL ENCOUNTER (EMERGENCY)
Age: 81
Discharge: HOME | End: 2021-09-09
Attending: EMERGENCY MEDICINE
Payer: MEDICARE

## 2021-01-01 ENCOUNTER — HOSPITAL ENCOUNTER (INPATIENT)
Age: 81
LOS: 2 days | Discharge: NURSING, REHAB OR LONG-TERM CARE | DRG: 812 | End: 2021-08-12
Attending: EMERGENCY MEDICINE | Admitting: INTERNAL MEDICINE
Payer: MEDICARE

## 2021-01-01 ENCOUNTER — APPOINTMENT (OUTPATIENT)
Dept: CT IMAGING | Age: 81
DRG: 690 | End: 2021-01-01
Payer: MEDICARE

## 2021-01-01 ENCOUNTER — IMMUNIZATION (OUTPATIENT)
Dept: PRIMARY CARE CLINIC | Age: 81
End: 2021-01-01
Payer: MEDICARE

## 2021-01-01 ENCOUNTER — CLINICAL DOCUMENTATION (OUTPATIENT)
Dept: FAMILY MEDICINE CLINIC | Age: 81
End: 2021-01-01

## 2021-01-01 ENCOUNTER — APPOINTMENT (OUTPATIENT)
Dept: GENERAL RADIOLOGY | Age: 81
DRG: 280 | End: 2021-01-01
Payer: MEDICARE

## 2021-01-01 ENCOUNTER — APPOINTMENT (OUTPATIENT)
Dept: GENERAL RADIOLOGY | Age: 81
End: 2021-01-01
Payer: MEDICARE

## 2021-01-01 ENCOUNTER — HOSPITAL ENCOUNTER (EMERGENCY)
Age: 81
Discharge: HOME | DRG: 690 | End: 2021-06-15
Attending: EMERGENCY MEDICINE
Payer: MEDICARE

## 2021-01-01 ENCOUNTER — HOSPITAL ENCOUNTER (INPATIENT)
Age: 81
LOS: 12 days | Discharge: NURSING, REHAB OR LONG-TERM CARE | DRG: 280 | End: 2021-07-03
Attending: EMERGENCY MEDICINE | Admitting: INTERNAL MEDICINE
Payer: MEDICARE

## 2021-01-01 ENCOUNTER — APPOINTMENT (OUTPATIENT)
Dept: GENERAL RADIOLOGY | Age: 81
DRG: 291 | End: 2021-01-01
Payer: MEDICARE

## 2021-01-01 ENCOUNTER — APPOINTMENT (OUTPATIENT)
Dept: GENERAL RADIOLOGY | Age: 81
DRG: 690 | End: 2021-01-01
Payer: MEDICARE

## 2021-01-01 ENCOUNTER — HOSPITAL ENCOUNTER (INPATIENT)
Age: 81
LOS: 2 days | Discharge: HOME | DRG: 690 | End: 2021-06-17
Attending: EMERGENCY MEDICINE | Admitting: INTERNAL MEDICINE
Payer: MEDICARE

## 2021-01-01 ENCOUNTER — APPOINTMENT (OUTPATIENT)
Dept: CARDIAC CATH/INVASIVE PROCEDURES | Age: 81
DRG: 280 | End: 2021-01-01
Payer: MEDICARE

## 2021-01-01 ENCOUNTER — APPOINTMENT (OUTPATIENT)
Dept: CT IMAGING | Age: 81
DRG: 812 | End: 2021-01-01
Payer: MEDICARE

## 2021-01-01 ENCOUNTER — HOSPITAL ENCOUNTER (INPATIENT)
Age: 81
LOS: 2 days | Discharge: HOME | DRG: 291 | End: 2021-08-02
Attending: STUDENT IN AN ORGANIZED HEALTH CARE EDUCATION/TRAINING PROGRAM | Admitting: INTERNAL MEDICINE
Payer: MEDICARE

## 2021-01-01 ENCOUNTER — APPOINTMENT (OUTPATIENT)
Dept: CT IMAGING | Age: 81
End: 2021-01-01
Payer: MEDICARE

## 2021-01-01 VITALS
TEMPERATURE: 98.5 F | HEIGHT: 71 IN | HEART RATE: 80 BPM | DIASTOLIC BLOOD PRESSURE: 58 MMHG | SYSTOLIC BLOOD PRESSURE: 120 MMHG | RESPIRATION RATE: 20 BRPM | WEIGHT: 210 LBS | BODY MASS INDEX: 29.4 KG/M2 | OXYGEN SATURATION: 96 %

## 2021-01-01 VITALS
WEIGHT: 210 LBS | HEIGHT: 71 IN | HEART RATE: 88 BPM | BODY MASS INDEX: 29.4 KG/M2 | OXYGEN SATURATION: 95 % | DIASTOLIC BLOOD PRESSURE: 74 MMHG | RESPIRATION RATE: 20 BRPM | SYSTOLIC BLOOD PRESSURE: 154 MMHG | TEMPERATURE: 97.3 F

## 2021-01-01 VITALS
SYSTOLIC BLOOD PRESSURE: 59 MMHG | TEMPERATURE: 97.1 F | OXYGEN SATURATION: 97 % | WEIGHT: 209.5 LBS | BODY MASS INDEX: 30.94 KG/M2 | RESPIRATION RATE: 15 BRPM | HEART RATE: 73 BPM | DIASTOLIC BLOOD PRESSURE: 46 MMHG

## 2021-01-01 VITALS
RESPIRATION RATE: 20 BRPM | OXYGEN SATURATION: 97 % | WEIGHT: 236 LBS | BODY MASS INDEX: 34.96 KG/M2 | HEART RATE: 69 BPM | TEMPERATURE: 97.6 F | SYSTOLIC BLOOD PRESSURE: 122 MMHG | HEIGHT: 69 IN | DIASTOLIC BLOOD PRESSURE: 58 MMHG

## 2021-01-01 VITALS
HEIGHT: 69 IN | RESPIRATION RATE: 20 BRPM | SYSTOLIC BLOOD PRESSURE: 95 MMHG | TEMPERATURE: 98 F | DIASTOLIC BLOOD PRESSURE: 48 MMHG | BODY MASS INDEX: 34.07 KG/M2 | OXYGEN SATURATION: 93 % | WEIGHT: 230 LBS | HEART RATE: 70 BPM

## 2021-01-01 VITALS
WEIGHT: 231.6 LBS | TEMPERATURE: 98.6 F | HEIGHT: 69 IN | DIASTOLIC BLOOD PRESSURE: 79 MMHG | BODY MASS INDEX: 34.3 KG/M2 | HEART RATE: 69 BPM | RESPIRATION RATE: 19 BRPM | OXYGEN SATURATION: 90 % | SYSTOLIC BLOOD PRESSURE: 110 MMHG

## 2021-01-01 DIAGNOSIS — D64.9 ANEMIA REQUIRING TRANSFUSIONS: ICD-10-CM

## 2021-01-01 DIAGNOSIS — D64.9 ACUTE ON CHRONIC ANEMIA: ICD-10-CM
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
## 2021-01-01 DIAGNOSIS — D64.9 ANEMIA, UNSPECIFIED TYPE: ICD-10-CM

## 2021-01-01 DIAGNOSIS — N39.0 URINARY TRACT INFECTION WITH HEMATURIA, SITE UNSPECIFIED: Primary | ICD-10-CM

## 2021-01-01 DIAGNOSIS — I21.4 NSTEMI (NON-ST ELEVATED MYOCARDIAL INFARCTION) (HCC): ICD-10-CM

## 2021-01-01 DIAGNOSIS — R77.8 ELEVATED TROPONIN: ICD-10-CM

## 2021-01-01 DIAGNOSIS — I21.4 NSTEMI (NON-ST ELEVATED MYOCARDIAL INFARCTION) (HCC): Primary | ICD-10-CM

## 2021-01-01 DIAGNOSIS — I50.9 ACUTE ON CHRONIC CONGESTIVE HEART FAILURE, UNSPECIFIED HEART FAILURE TYPE (HCC): Primary | ICD-10-CM

## 2021-01-01 DIAGNOSIS — R53.1 GENERALIZED WEAKNESS: Primary | ICD-10-CM

## 2021-01-01 DIAGNOSIS — E11.9 TYPE 2 DIABETES MELLITUS WITHOUT COMPLICATION, WITHOUT LONG-TERM CURRENT USE OF INSULIN (HCC): ICD-10-CM

## 2021-01-01 DIAGNOSIS — R60.0 LOWER EXTREMITY EDEMA: ICD-10-CM

## 2021-01-01 DIAGNOSIS — E78.00 HYPERCHOLESTEROLEMIA: ICD-10-CM

## 2021-01-01 DIAGNOSIS — R31.9 URINARY TRACT INFECTION WITH HEMATURIA, SITE UNSPECIFIED: Primary | ICD-10-CM

## 2021-01-01 DIAGNOSIS — R79.89 ELEVATED BRAIN NATRIURETIC PEPTIDE (BNP) LEVEL: ICD-10-CM

## 2021-01-01 DIAGNOSIS — Z51.5 HOSPICE CARE: Primary | ICD-10-CM

## 2021-01-01 DIAGNOSIS — R06.02 SHORTNESS OF BREATH: ICD-10-CM

## 2021-01-01 LAB
ABO/RH: NORMAL
ACANTHOCYTES: ABNORMAL
ADENOVIRUS BY PCR: NOT DETECTED
ALBUMIN SERPL-MCNC: 2.8 G/DL (ref 3.5–5.2)
ALBUMIN SERPL-MCNC: 2.9 G/DL (ref 3.5–5.2)
ALBUMIN SERPL-MCNC: 2.9 G/DL (ref 3.5–5.2)
ALBUMIN SERPL-MCNC: 3 G/DL (ref 3.5–5.2)
ALBUMIN SERPL-MCNC: 3.1 G/DL (ref 3.5–5.2)
ALBUMIN SERPL-MCNC: 3.2 G/DL (ref 3.5–5.2)
ALBUMIN SERPL-MCNC: 3.3 G/DL (ref 3.5–5.2)
ALBUMIN SERPL-MCNC: 3.4 G/DL (ref 3.5–5.2)
ALBUMIN SERPL-MCNC: 3.5 G/DL (ref 3.5–5.2)
ALBUMIN SERPL-MCNC: 3.7 G/DL (ref 3.5–5.2)
ALBUMIN SERPL-MCNC: 3.8 G/DL (ref 3.5–5.2)
ALBUMIN SERPL-MCNC: 4.2 G/DL (ref 3.5–5.2)
ALBUMIN SERPL-MCNC: 4.3 G/DL (ref 3.5–5.2)
ALBUMIN SERPL-MCNC: 4.5 G/DL (ref 3.5–5.2)
ALP BLD-CCNC: 141 U/L (ref 40–129)
ALP BLD-CCNC: 143 U/L (ref 40–129)
ALP BLD-CCNC: 144 U/L (ref 40–129)
ALP BLD-CCNC: 149 U/L (ref 40–129)
ALP BLD-CCNC: 150 U/L (ref 40–129)
ALP BLD-CCNC: 150 U/L (ref 40–129)
ALP BLD-CCNC: 159 U/L (ref 40–129)
ALP BLD-CCNC: 159 U/L (ref 40–129)
ALP BLD-CCNC: 162 U/L (ref 40–129)
ALP BLD-CCNC: 162 U/L (ref 40–129)
ALP BLD-CCNC: 166 U/L (ref 40–129)
ALP BLD-CCNC: 169 U/L (ref 40–129)
ALP BLD-CCNC: 173 U/L (ref 40–129)
ALP BLD-CCNC: 178 U/L (ref 40–129)
ALP BLD-CCNC: 178 U/L (ref 40–129)
ALP BLD-CCNC: 186 U/L (ref 40–129)
ALP BLD-CCNC: 187 U/L (ref 40–129)
ALP BLD-CCNC: 215 U/L (ref 40–129)
ALP BLD-CCNC: 63 U/L (ref 40–129)
ALP BLD-CCNC: 73 U/L (ref 40–129)
ALP BLD-CCNC: 75 U/L (ref 40–129)
ALP BLD-CCNC: 77 U/L (ref 40–129)
ALP BLD-CCNC: 80 U/L (ref 40–129)
ALT SERPL-CCNC: 10 U/L (ref 0–40)
ALT SERPL-CCNC: 11 U/L (ref 0–40)
ALT SERPL-CCNC: 110 U/L (ref 0–40)
ALT SERPL-CCNC: 16 U/L (ref 0–40)
ALT SERPL-CCNC: 18 U/L (ref 0–40)
ALT SERPL-CCNC: 21 U/L (ref 0–40)
ALT SERPL-CCNC: 22 U/L (ref 0–40)
ALT SERPL-CCNC: 23 U/L (ref 0–40)
ALT SERPL-CCNC: 28 U/L (ref 0–40)
ALT SERPL-CCNC: 30 U/L (ref 0–40)
ALT SERPL-CCNC: 35 U/L (ref 0–40)
ALT SERPL-CCNC: 38 U/L (ref 0–40)
ALT SERPL-CCNC: 40 U/L (ref 0–40)
ALT SERPL-CCNC: 43 U/L (ref 0–40)
ALT SERPL-CCNC: 45 U/L (ref 0–40)
ALT SERPL-CCNC: 48 U/L (ref 0–40)
ALT SERPL-CCNC: 54 U/L (ref 0–40)
ALT SERPL-CCNC: 60 U/L (ref 0–40)
ALT SERPL-CCNC: 61 U/L (ref 0–40)
ALT SERPL-CCNC: 93 U/L (ref 0–40)
ALT SERPL-CCNC: 95 U/L (ref 0–40)
ANION GAP SERPL CALCULATED.3IONS-SCNC: 10 MMOL/L (ref 7–16)
ANION GAP SERPL CALCULATED.3IONS-SCNC: 10 MMOL/L (ref 7–16)
ANION GAP SERPL CALCULATED.3IONS-SCNC: 11 MMOL/L (ref 7–16)
ANION GAP SERPL CALCULATED.3IONS-SCNC: 12 MMOL/L (ref 7–16)
ANION GAP SERPL CALCULATED.3IONS-SCNC: 13 MMOL/L (ref 7–16)
ANION GAP SERPL CALCULATED.3IONS-SCNC: 14 MMOL/L (ref 7–16)
ANION GAP SERPL CALCULATED.3IONS-SCNC: 17 MMOL/L (ref 7–16)
ANION GAP SERPL CALCULATED.3IONS-SCNC: 18 MMOL/L (ref 7–16)
ANION GAP SERPL CALCULATED.3IONS-SCNC: 24 MMOL/L (ref 7–16)
ANION GAP SERPL CALCULATED.3IONS-SCNC: 9 MMOL/L (ref 7–16)
ANISOCYTOSIS: ABNORMAL
ANTIBODY SCREEN: NORMAL
APTT: 113.4 SEC (ref 24.5–35.1)
APTT: 28.3 SEC (ref 24.5–35.1)
APTT: 40.4 SEC (ref 24.5–35.1)
APTT: 40.8 SEC (ref 24.5–35.1)
APTT: 46.1 SEC (ref 24.5–35.1)
APTT: 49.4 SEC (ref 24.5–35.1)
APTT: 57.5 SEC (ref 24.5–35.1)
AST SERPL-CCNC: 11 U/L (ref 0–39)
AST SERPL-CCNC: 12 U/L (ref 0–39)
AST SERPL-CCNC: 13 U/L (ref 0–39)
AST SERPL-CCNC: 14 U/L (ref 0–39)
AST SERPL-CCNC: 14 U/L (ref 0–39)
AST SERPL-CCNC: 15 U/L (ref 0–39)
AST SERPL-CCNC: 16 U/L (ref 0–39)
AST SERPL-CCNC: 17 U/L (ref 0–39)
AST SERPL-CCNC: 18 U/L (ref 0–39)
AST SERPL-CCNC: 18 U/L (ref 0–39)
AST SERPL-CCNC: 19 U/L (ref 0–39)
AST SERPL-CCNC: 20 U/L (ref 0–39)
AST SERPL-CCNC: 28 U/L (ref 0–39)
AST SERPL-CCNC: 34 U/L (ref 0–39)
AST SERPL-CCNC: 35 U/L (ref 0–39)
AST SERPL-CCNC: 39 U/L (ref 0–39)
AST SERPL-CCNC: 53 U/L (ref 0–39)
AST SERPL-CCNC: 68 U/L (ref 0–39)
AST SERPL-CCNC: 73 U/L (ref 0–39)
AST SERPL-CCNC: 86 U/L (ref 0–39)
BACTERIA: ABNORMAL /HPF
BACTERIA: ABNORMAL /HPF
BASOPHILIC STIPPLING: ABNORMAL
BASOPHILS ABSOLUTE: 0 E9/L (ref 0–0.2)
BASOPHILS ABSOLUTE: 0.04 E9/L (ref 0–0.2)
BASOPHILS ABSOLUTE: 0.04 E9/L (ref 0–0.2)
BASOPHILS ABSOLUTE: 0.05 E9/L (ref 0–0.2)
BASOPHILS ABSOLUTE: 0.07 E9/L (ref 0–0.2)
BASOPHILS RELATIVE PERCENT: 0 % (ref 0–2)
BASOPHILS RELATIVE PERCENT: 0 % (ref 0–2)
BASOPHILS RELATIVE PERCENT: 0.1 % (ref 0–2)
BASOPHILS RELATIVE PERCENT: 0.2 % (ref 0–2)
BASOPHILS RELATIVE PERCENT: 0.3 % (ref 0–2)
BASOPHILS RELATIVE PERCENT: 0.3 % (ref 0–2)
BASOPHILS RELATIVE PERCENT: 0.4 % (ref 0–2)
BASOPHILS RELATIVE PERCENT: 0.5 % (ref 0–2)
BASOPHILS RELATIVE PERCENT: 0.9 % (ref 0–2)
BILIRUB SERPL-MCNC: 0.4 MG/DL (ref 0–1.2)
BILIRUB SERPL-MCNC: 0.6 MG/DL (ref 0–1.2)
BILIRUB SERPL-MCNC: 0.7 MG/DL (ref 0–1.2)
BILIRUB SERPL-MCNC: 0.8 MG/DL (ref 0–1.2)
BILIRUB SERPL-MCNC: 0.9 MG/DL (ref 0–1.2)
BILIRUB SERPL-MCNC: 1 MG/DL (ref 0–1.2)
BILIRUB SERPL-MCNC: 1.1 MG/DL (ref 0–1.2)
BILIRUB SERPL-MCNC: 1.2 MG/DL (ref 0–1.2)
BILIRUB SERPL-MCNC: 1.2 MG/DL (ref 0–1.2)
BILIRUB SERPL-MCNC: 1.3 MG/DL (ref 0–1.2)
BILIRUB SERPL-MCNC: 1.3 MG/DL (ref 0–1.2)
BILIRUB SERPL-MCNC: 1.4 MG/DL (ref 0–1.2)
BILIRUB SERPL-MCNC: 1.4 MG/DL (ref 0–1.2)
BILIRUBIN URINE: NEGATIVE
BLOOD BANK DISPENSE STATUS: NORMAL
BLOOD BANK PRODUCT CODE: NORMAL
BLOOD, URINE: ABNORMAL
BLOOD, URINE: ABNORMAL
BLOOD, URINE: NEGATIVE
BORDETELLA PARAPERTUSSIS BY PCR: NOT DETECTED
BORDETELLA PERTUSSIS BY PCR: NOT DETECTED
BPU ID: NORMAL
BUN BLDV-MCNC: 17 MG/DL (ref 6–23)
BUN BLDV-MCNC: 19 MG/DL (ref 6–23)
BUN BLDV-MCNC: 20 MG/DL (ref 6–23)
BUN BLDV-MCNC: 20 MG/DL (ref 8–23)
BUN BLDV-MCNC: 21 MG/DL (ref 6–23)
BUN BLDV-MCNC: 22 MG/DL (ref 6–23)
BUN BLDV-MCNC: 22 MG/DL (ref 6–23)
BUN BLDV-MCNC: 23 MG/DL (ref 6–23)
BUN BLDV-MCNC: 24 MG/DL (ref 6–23)
BUN BLDV-MCNC: 25 MG/DL (ref 6–23)
BUN BLDV-MCNC: 25 MG/DL (ref 6–23)
BUN BLDV-MCNC: 28 MG/DL (ref 6–23)
BUN BLDV-MCNC: 30 MG/DL (ref 6–23)
BUN BLDV-MCNC: 32 MG/DL (ref 6–23)
BUN BLDV-MCNC: 35 MG/DL (ref 6–23)
BUN BLDV-MCNC: 35 MG/DL (ref 6–23)
BUN BLDV-MCNC: 37 MG/DL (ref 6–23)
BUN BLDV-MCNC: 39 MG/DL (ref 6–23)
BUN BLDV-MCNC: 44 MG/DL (ref 6–23)
BUN BLDV-MCNC: 45 MG/DL (ref 6–23)
BUN BLDV-MCNC: 46 MG/DL (ref 6–23)
BURR CELLS: ABNORMAL
C-REACTIVE PROTEIN: 7.4 MG/DL (ref 0–0.4)
CALCIUM SERPL-MCNC: 7.9 MG/DL (ref 8.6–10.2)
CALCIUM SERPL-MCNC: 7.9 MG/DL (ref 8.6–10.2)
CALCIUM SERPL-MCNC: 8.2 MG/DL (ref 8.6–10.2)
CALCIUM SERPL-MCNC: 8.3 MG/DL (ref 8.6–10.2)
CALCIUM SERPL-MCNC: 8.4 MG/DL (ref 8.6–10.2)
CALCIUM SERPL-MCNC: 8.5 MG/DL (ref 8.6–10.2)
CALCIUM SERPL-MCNC: 8.6 MG/DL (ref 8.6–10.2)
CALCIUM SERPL-MCNC: 8.7 MG/DL (ref 8.6–10.2)
CALCIUM SERPL-MCNC: 8.7 MG/DL (ref 8.6–10.2)
CALCIUM SERPL-MCNC: 9 MG/DL (ref 8.6–10.2)
CALCIUM SERPL-MCNC: 9.1 MG/DL (ref 8.6–10.2)
CALCIUM SERPL-MCNC: 9.3 MG/DL (ref 8.6–10.2)
CALCIUM SERPL-MCNC: 9.5 MG/DL (ref 8.6–10.2)
CHLAMYDOPHILIA PNEUMONIAE BY PCR: NOT DETECTED
CHLORIDE BLD-SCNC: 100 MMOL/L (ref 98–107)
CHLORIDE BLD-SCNC: 100 MMOL/L (ref 98–107)
CHLORIDE BLD-SCNC: 101 MMOL/L (ref 98–107)
CHLORIDE BLD-SCNC: 102 MMOL/L (ref 98–107)
CHLORIDE BLD-SCNC: 103 MMOL/L (ref 98–107)
CHLORIDE BLD-SCNC: 94 MMOL/L (ref 98–107)
CHLORIDE BLD-SCNC: 95 MMOL/L (ref 98–107)
CHLORIDE BLD-SCNC: 95 MMOL/L (ref 98–107)
CHLORIDE BLD-SCNC: 96 MMOL/L (ref 98–107)
CHLORIDE BLD-SCNC: 97 MMOL/L (ref 98–107)
CHLORIDE BLD-SCNC: 98 MMOL/L (ref 98–107)
CHLORIDE BLD-SCNC: 99 MMOL/L (ref 98–107)
CHOLESTEROL, TOTAL: 144 MG/DL (ref 0–199)
CK MB: 2.2 NG/ML (ref 0–7.7)
CLARITY: ABNORMAL
CLARITY: CLEAR
CLARITY: CLEAR
CO2: 20 MMOL/L (ref 22–29)
CO2: 22 MMOL/L (ref 22–29)
CO2: 23 MMOL/L (ref 22–29)
CO2: 23 MMOL/L (ref 22–29)
CO2: 24 MMOL/L (ref 22–29)
CO2: 25 MMOL/L (ref 22–29)
CO2: 26 MMOL/L (ref 22–29)
CO2: 27 MMOL/L (ref 22–29)
CO2: 28 MMOL/L (ref 22–29)
CO2: 29 MMOL/L (ref 22–29)
CO2: 31 MMOL/L (ref 22–29)
COLOR: YELLOW
CORONAVIRUS 229E BY PCR: NOT DETECTED
CORONAVIRUS HKU1 BY PCR: NOT DETECTED
CORONAVIRUS NL63 BY PCR: NOT DETECTED
CORONAVIRUS OC43 BY PCR: NOT DETECTED
CREAT SERPL-MCNC: 0.9 MG/DL (ref 0.7–1.2)
CREAT SERPL-MCNC: 0.9 MG/DL (ref 0.7–1.2)
CREAT SERPL-MCNC: 1 MG/DL (ref 0.7–1.2)
CREAT SERPL-MCNC: 1.1 MG/DL (ref 0.7–1.2)
CREAT SERPL-MCNC: 1.1 MG/DL (ref 0.7–1.2)
CREAT SERPL-MCNC: 1.2 MG/DL (ref 0.7–1.2)
CREAT SERPL-MCNC: 1.3 MG/DL (ref 0.7–1.2)
CREAT SERPL-MCNC: 1.4 MG/DL (ref 0.7–1.2)
CREAT SERPL-MCNC: 1.5 MG/DL (ref 0.7–1.2)
CREAT SERPL-MCNC: 1.5 MG/DL (ref 0.7–1.2)
CREAT SERPL-MCNC: 1.6 MG/DL (ref 0.7–1.2)
D DIMER: 545 NG/ML DDU
DESCRIPTION BLOOD BANK: NORMAL
EKG ATRIAL RATE: 65 BPM
EKG ATRIAL RATE: 66 BPM
EKG ATRIAL RATE: 72 BPM
EKG ATRIAL RATE: 85 BPM
EKG ATRIAL RATE: 86 BPM
EKG ATRIAL RATE: 91 BPM
EKG ATRIAL RATE: 98 BPM
EKG P AXIS: 36 DEGREES
EKG P AXIS: 63 DEGREES
EKG P AXIS: 66 DEGREES
EKG P AXIS: 76 DEGREES
EKG P AXIS: 85 DEGREES
EKG P AXIS: 88 DEGREES
EKG P-R INTERVAL: 106 MS
EKG P-R INTERVAL: 108 MS
EKG P-R INTERVAL: 190 MS
EKG P-R INTERVAL: 190 MS
EKG P-R INTERVAL: 202 MS
EKG P-R INTERVAL: 220 MS
EKG Q-T INTERVAL: 382 MS
EKG Q-T INTERVAL: 384 MS
EKG Q-T INTERVAL: 410 MS
EKG Q-T INTERVAL: 420 MS
EKG Q-T INTERVAL: 430 MS
EKG Q-T INTERVAL: 456 MS
EKG Q-T INTERVAL: 474 MS
EKG QRS DURATION: 136 MS
EKG QRS DURATION: 138 MS
EKG QRS DURATION: 140 MS
EKG QRS DURATION: 146 MS
EKG QRS DURATION: 150 MS
EKG QTC CALCULATION (BAZETT): 450 MS
EKG QTC CALCULATION (BAZETT): 469 MS
EKG QTC CALCULATION (BAZETT): 474 MS
EKG QTC CALCULATION (BAZETT): 487 MS
EKG QTC CALCULATION (BAZETT): 490 MS
EKG QTC CALCULATION (BAZETT): 502 MS
EKG QTC CALCULATION (BAZETT): 519 MS
EKG R AXIS: -26 DEGREES
EKG R AXIS: -30 DEGREES
EKG R AXIS: -36 DEGREES
EKG R AXIS: -63 DEGREES
EKG R AXIS: 48 DEGREES
EKG R AXIS: 54 DEGREES
EKG R AXIS: 83 DEGREES
EKG T AXIS: 147 DEGREES
EKG T AXIS: 41 DEGREES
EKG T AXIS: 62 DEGREES
EKG T AXIS: 81 DEGREES
EKG T AXIS: 83 DEGREES
EKG T AXIS: 94 DEGREES
EKG T AXIS: 95 DEGREES
EKG VENTRICULAR RATE: 65 BPM
EKG VENTRICULAR RATE: 66 BPM
EKG VENTRICULAR RATE: 72 BPM
EKG VENTRICULAR RATE: 85 BPM
EKG VENTRICULAR RATE: 86 BPM
EKG VENTRICULAR RATE: 91 BPM
EKG VENTRICULAR RATE: 98 BPM
EOSINOPHILS ABSOLUTE: 0 E9/L (ref 0.05–0.5)
EOSINOPHILS ABSOLUTE: 0.03 E9/L (ref 0.05–0.5)
EOSINOPHILS ABSOLUTE: 0.04 E9/L (ref 0.05–0.5)
EOSINOPHILS RELATIVE PERCENT: 0 % (ref 0–6)
EOSINOPHILS RELATIVE PERCENT: 0.1 % (ref 0–6)
EOSINOPHILS RELATIVE PERCENT: 0.2 % (ref 0–6)
EOSINOPHILS RELATIVE PERCENT: 0.3 % (ref 0–6)
EOSINOPHILS RELATIVE PERCENT: 0.4 % (ref 0–6)
EOSINOPHILS RELATIVE PERCENT: 0.6 % (ref 0–6)
EOSINOPHILS RELATIVE PERCENT: 0.7 % (ref 0–6)
EOSINOPHILS RELATIVE PERCENT: 0.8 % (ref 0–6)
EOSINOPHILS RELATIVE PERCENT: 0.9 % (ref 0–6)
EOSINOPHILS RELATIVE PERCENT: 0.9 % (ref 0–6)
EOSINOPHILS RELATIVE PERCENT: 1 % (ref 0–6)
FERRITIN: 1263 NG/ML
FOLATE: 14.8 NG/ML (ref 4.8–24.2)
GFR AFRICAN AMERICAN: 50
GFR AFRICAN AMERICAN: 54
GFR AFRICAN AMERICAN: 54
GFR AFRICAN AMERICAN: 59
GFR AFRICAN AMERICAN: >60
GFR NON-AFRICAN AMERICAN: 42 ML/MIN/1.73
GFR NON-AFRICAN AMERICAN: 45 ML/MIN/1.73
GFR NON-AFRICAN AMERICAN: 45 ML/MIN/1.73
GFR NON-AFRICAN AMERICAN: 48 ML/MIN/1.73
GFR NON-AFRICAN AMERICAN: 53 ML/MIN/1.73
GFR NON-AFRICAN AMERICAN: 58 ML/MIN/1.73
GFR NON-AFRICAN AMERICAN: >60 ML/MIN/1.73
GLUCOSE BLD-MCNC: 104 MG/DL (ref 74–99)
GLUCOSE BLD-MCNC: 114 MG/DL (ref 74–99)
GLUCOSE BLD-MCNC: 117 MG/DL (ref 74–99)
GLUCOSE BLD-MCNC: 126 MG/DL (ref 74–99)
GLUCOSE BLD-MCNC: 134 MG/DL (ref 74–99)
GLUCOSE BLD-MCNC: 139 MG/DL (ref 74–99)
GLUCOSE BLD-MCNC: 141 MG/DL (ref 74–99)
GLUCOSE BLD-MCNC: 147 MG/DL (ref 74–99)
GLUCOSE BLD-MCNC: 147 MG/DL (ref 74–99)
GLUCOSE BLD-MCNC: 149 MG/DL (ref 74–99)
GLUCOSE BLD-MCNC: 150 MG/DL (ref 74–99)
GLUCOSE BLD-MCNC: 151 MG/DL (ref 74–99)
GLUCOSE BLD-MCNC: 156 MG/DL (ref 74–99)
GLUCOSE BLD-MCNC: 159 MG/DL (ref 74–99)
GLUCOSE BLD-MCNC: 161 MG/DL (ref 74–99)
GLUCOSE BLD-MCNC: 161 MG/DL (ref 74–99)
GLUCOSE BLD-MCNC: 162 MG/DL (ref 74–99)
GLUCOSE BLD-MCNC: 163 MG/DL (ref 74–99)
GLUCOSE BLD-MCNC: 178 MG/DL (ref 74–99)
GLUCOSE BLD-MCNC: 182 MG/DL (ref 74–99)
GLUCOSE BLD-MCNC: 186 MG/DL (ref 74–99)
GLUCOSE BLD-MCNC: 192 MG/DL (ref 74–99)
GLUCOSE BLD-MCNC: 204 MG/DL (ref 74–99)
GLUCOSE BLD-MCNC: 240 MG/DL (ref 74–99)
GLUCOSE BLD-MCNC: 95 MG/DL (ref 74–99)
GLUCOSE URINE: 500 MG/DL
GLUCOSE URINE: >=1000 MG/DL
GLUCOSE URINE: >=1000 MG/DL
HBA1C MFR BLD: 6.1 % (ref 4–5.6)
HBA1C MFR BLD: 6.7 % (ref 4–5.6)
HBA1C MFR BLD: 6.7 % (ref 4–5.6)
HCT VFR BLD CALC: 17.4 % (ref 37–54)
HCT VFR BLD CALC: 17.8 % (ref 37–54)
HCT VFR BLD CALC: 18.4 % (ref 37–54)
HCT VFR BLD CALC: 19.5 % (ref 37–54)
HCT VFR BLD CALC: 19.7 % (ref 37–54)
HCT VFR BLD CALC: 20.9 % (ref 37–54)
HCT VFR BLD CALC: 21.1 % (ref 37–54)
HCT VFR BLD CALC: 21.4 % (ref 37–54)
HCT VFR BLD CALC: 21.6 % (ref 37–54)
HCT VFR BLD CALC: 21.7 % (ref 37–54)
HCT VFR BLD CALC: 22.3 % (ref 37–54)
HCT VFR BLD CALC: 22.5 % (ref 37–54)
HCT VFR BLD CALC: 22.7 % (ref 37–54)
HCT VFR BLD CALC: 23.7 % (ref 37–54)
HCT VFR BLD CALC: 24.1 % (ref 37–54)
HCT VFR BLD CALC: 24.1 % (ref 37–54)
HCT VFR BLD CALC: 24.4 % (ref 37–54)
HCT VFR BLD CALC: 25.1 % (ref 37–54)
HCT VFR BLD CALC: 25.2 % (ref 37–54)
HCT VFR BLD CALC: 25.2 % (ref 37–54)
HCT VFR BLD CALC: 26.7 % (ref 37–54)
HCT VFR BLD CALC: 27.1 % (ref 37–54)
HCT VFR BLD CALC: 28.3 % (ref 37–54)
HCT VFR BLD CALC: 28.9 % (ref 37–54)
HCT VFR BLD CALC: 30.3 % (ref 37–54)
HCT VFR BLD CALC: 31 % (ref 37–54)
HDLC SERPL-MCNC: 36 MG/DL
HEMOGLOBIN: 10.6 G/DL (ref 12.5–16.5)
HEMOGLOBIN: 10.6 G/DL (ref 12.5–16.5)
HEMOGLOBIN: 5.7 G/DL (ref 12.5–16.5)
HEMOGLOBIN: 5.8 G/DL (ref 12.5–16.5)
HEMOGLOBIN: 6.1 G/DL (ref 12.5–16.5)
HEMOGLOBIN: 6.3 G/DL (ref 12.5–16.5)
HEMOGLOBIN: 6.4 G/DL (ref 12.5–16.5)
HEMOGLOBIN: 7 G/DL (ref 12.5–16.5)
HEMOGLOBIN: 7.1 G/DL (ref 12.5–16.5)
HEMOGLOBIN: 7.1 G/DL (ref 12.5–16.5)
HEMOGLOBIN: 7.2 G/DL (ref 12.5–16.5)
HEMOGLOBIN: 7.3 G/DL (ref 12.5–16.5)
HEMOGLOBIN: 7.5 G/DL (ref 12.5–16.5)
HEMOGLOBIN: 7.6 G/DL (ref 12.5–16.5)
HEMOGLOBIN: 7.7 G/DL (ref 12.5–16.5)
HEMOGLOBIN: 7.8 G/DL (ref 12.5–16.5)
HEMOGLOBIN: 7.8 G/DL (ref 12.5–16.5)
HEMOGLOBIN: 8 G/DL (ref 12.5–16.5)
HEMOGLOBIN: 8 G/DL (ref 12.5–16.5)
HEMOGLOBIN: 8.2 G/DL (ref 12.5–16.5)
HEMOGLOBIN: 8.4 G/DL (ref 12.5–16.5)
HEMOGLOBIN: 8.5 G/DL (ref 12.5–16.5)
HEMOGLOBIN: 9 G/DL (ref 12.5–16.5)
HEMOGLOBIN: 9.1 G/DL (ref 12.5–16.5)
HEMOGLOBIN: 9.7 G/DL (ref 12.5–16.5)
HEMOGLOBIN: 9.9 G/DL (ref 12.5–16.5)
HUMAN METAPNEUMOVIRUS BY PCR: NOT DETECTED
HUMAN RHINOVIRUS/ENTEROVIRUS BY PCR: DETECTED
HYPOCHROMIA: ABNORMAL
IMMATURE RETIC FRACT: 28.5 % (ref 2.3–13.4)
INFLUENZA A BY PCR: NOT DETECTED
INFLUENZA B BY PCR: NOT DETECTED
INR BLD: 1.1
IRON SATURATION: 42 % (ref 20–55)
IRON: 113 MCG/DL (ref 59–158)
KETONES, URINE: 15 MG/DL
KETONES, URINE: ABNORMAL MG/DL
KETONES, URINE: NEGATIVE MG/DL
LACTATE DEHYDROGENASE: 258 U/L (ref 135–225)
LACTATE DEHYDROGENASE: 507 U/L (ref 135–225)
LACTIC ACID, SEPSIS: 3.4 MMOL/L (ref 0.5–1.9)
LACTIC ACID, SEPSIS: 4.1 MMOL/L (ref 0.5–1.9)
LACTIC ACID: 2.3 MMOL/L (ref 0.5–2.2)
LACTIC ACID: 2.4 MMOL/L (ref 0.5–2.2)
LACTIC ACID: 5.4 MMOL/L (ref 0.5–2.2)
LDL CHOLESTEROL CALCULATED: 71 MG/DL (ref 0–99)
LEUKOCYTE ESTERASE, URINE: ABNORMAL
LEUKOCYTE ESTERASE, URINE: ABNORMAL
LEUKOCYTE ESTERASE, URINE: NEGATIVE
LYMPHOCYTES ABSOLUTE: 0.66 E9/L (ref 1.5–4)
LYMPHOCYTES ABSOLUTE: 0.74 E9/L (ref 1.5–4)
LYMPHOCYTES ABSOLUTE: 0.77 E9/L (ref 1.5–4)
LYMPHOCYTES ABSOLUTE: 0.97 E9/L (ref 1.5–4)
LYMPHOCYTES ABSOLUTE: 1.1 E9/L (ref 1.5–4)
LYMPHOCYTES ABSOLUTE: 1.1 E9/L (ref 1.5–4)
LYMPHOCYTES ABSOLUTE: 1.15 E9/L (ref 1.5–4)
LYMPHOCYTES ABSOLUTE: 1.16 E9/L (ref 1.5–4)
LYMPHOCYTES ABSOLUTE: 1.19 E9/L (ref 1.5–4)
LYMPHOCYTES ABSOLUTE: 1.23 E9/L (ref 1.5–4)
LYMPHOCYTES ABSOLUTE: 1.3 E9/L (ref 1.5–4)
LYMPHOCYTES ABSOLUTE: 1.32 E9/L (ref 1.5–4)
LYMPHOCYTES ABSOLUTE: 1.35 E9/L (ref 1.5–4)
LYMPHOCYTES ABSOLUTE: 1.35 E9/L (ref 1.5–4)
LYMPHOCYTES ABSOLUTE: 1.46 E9/L (ref 1.5–4)
LYMPHOCYTES ABSOLUTE: 1.76 E9/L (ref 1.5–4)
LYMPHOCYTES RELATIVE PERCENT: 11.3 % (ref 20–42)
LYMPHOCYTES RELATIVE PERCENT: 14 % (ref 20–42)
LYMPHOCYTES RELATIVE PERCENT: 22.6 % (ref 20–42)
LYMPHOCYTES RELATIVE PERCENT: 22.6 % (ref 20–42)
LYMPHOCYTES RELATIVE PERCENT: 24.3 % (ref 20–42)
LYMPHOCYTES RELATIVE PERCENT: 25.2 % (ref 20–42)
LYMPHOCYTES RELATIVE PERCENT: 25.2 % (ref 20–42)
LYMPHOCYTES RELATIVE PERCENT: 25.4 % (ref 20–42)
LYMPHOCYTES RELATIVE PERCENT: 27 % (ref 20–42)
LYMPHOCYTES RELATIVE PERCENT: 29.8 % (ref 20–42)
LYMPHOCYTES RELATIVE PERCENT: 30.4 % (ref 20–42)
LYMPHOCYTES RELATIVE PERCENT: 32.5 % (ref 20–42)
LYMPHOCYTES RELATIVE PERCENT: 35.1 % (ref 20–42)
LYMPHOCYTES RELATIVE PERCENT: 42.6 % (ref 20–42)
LYMPHOCYTES RELATIVE PERCENT: 43 % (ref 20–42)
LYMPHOCYTES RELATIVE PERCENT: 8.7 % (ref 20–42)
MACRO-OVALOCYTES: ABNORMAL
MAGNESIUM: 1.7 MG/DL (ref 1.6–2.6)
MAGNESIUM: 2 MG/DL (ref 1.6–2.6)
MCH RBC QN AUTO: 33.3 PG (ref 26–35)
MCH RBC QN AUTO: 33.7 PG (ref 26–35)
MCH RBC QN AUTO: 34 PG (ref 26–35)
MCH RBC QN AUTO: 34 PG (ref 26–35)
MCH RBC QN AUTO: 34.2 PG (ref 26–35)
MCH RBC QN AUTO: 34.2 PG (ref 26–35)
MCH RBC QN AUTO: 34.5 PG (ref 26–35)
MCH RBC QN AUTO: 35.2 PG (ref 26–35)
MCH RBC QN AUTO: 35.3 PG (ref 26–35)
MCH RBC QN AUTO: 35.3 PG (ref 26–35)
MCH RBC QN AUTO: 35.8 PG (ref 26–35)
MCH RBC QN AUTO: 36 PG (ref 26–35)
MCH RBC QN AUTO: 36.1 PG (ref 26–35)
MCH RBC QN AUTO: 36.2 PG (ref 26–35)
MCH RBC QN AUTO: 36.4 PG (ref 26–35)
MCH RBC QN AUTO: 36.4 PG (ref 26–35)
MCH RBC QN AUTO: 36.9 PG (ref 26–35)
MCH RBC QN AUTO: 36.9 PG (ref 26–35)
MCH RBC QN AUTO: 37 PG (ref 26–35)
MCH RBC QN AUTO: 37 PG (ref 26–35)
MCH RBC QN AUTO: 37.1 PG (ref 26–35)
MCH RBC QN AUTO: 37.1 PG (ref 26–35)
MCH RBC QN AUTO: 37.5 PG (ref 26–35)
MCH RBC QN AUTO: 37.6 PG (ref 26–35)
MCHC RBC AUTO-ENTMCNC: 31.7 % (ref 32–34.5)
MCHC RBC AUTO-ENTMCNC: 31.9 % (ref 32–34.5)
MCHC RBC AUTO-ENTMCNC: 32 % (ref 32–34.5)
MCHC RBC AUTO-ENTMCNC: 32.4 % (ref 32–34.5)
MCHC RBC AUTO-ENTMCNC: 32.5 % (ref 32–34.5)
MCHC RBC AUTO-ENTMCNC: 32.6 % (ref 32–34.5)
MCHC RBC AUTO-ENTMCNC: 32.7 % (ref 32–34.5)
MCHC RBC AUTO-ENTMCNC: 32.8 % (ref 32–34.5)
MCHC RBC AUTO-ENTMCNC: 32.8 % (ref 32–34.5)
MCHC RBC AUTO-ENTMCNC: 33.3 % (ref 32–34.5)
MCHC RBC AUTO-ENTMCNC: 33.6 % (ref 32–34.5)
MCHC RBC AUTO-ENTMCNC: 33.7 % (ref 32–34.5)
MCHC RBC AUTO-ENTMCNC: 33.7 % (ref 32–34.5)
MCHC RBC AUTO-ENTMCNC: 34 % (ref 32–34.5)
MCHC RBC AUTO-ENTMCNC: 34 % (ref 32–34.5)
MCHC RBC AUTO-ENTMCNC: 34.1 % (ref 32–34.5)
MCHC RBC AUTO-ENTMCNC: 34.2 % (ref 32–34.5)
MCHC RBC AUTO-ENTMCNC: 34.3 % (ref 32–34.5)
MCHC RBC AUTO-ENTMCNC: 34.4 % (ref 32–34.5)
MCHC RBC AUTO-ENTMCNC: 34.4 % (ref 32–34.5)
MCHC RBC AUTO-ENTMCNC: 35 % (ref 32–34.5)
MCHC RBC AUTO-ENTMCNC: 35.2 % (ref 32–34.5)
MCV RBC AUTO: 100.5 FL (ref 80–99.9)
MCV RBC AUTO: 101.5 FL (ref 80–99.9)
MCV RBC AUTO: 102.3 FL (ref 80–99.9)
MCV RBC AUTO: 102.7 FL (ref 80–99.9)
MCV RBC AUTO: 103 FL (ref 80–99.9)
MCV RBC AUTO: 105.3 FL (ref 80–99.9)
MCV RBC AUTO: 105.7 FL (ref 80–99.9)
MCV RBC AUTO: 106.1 FL (ref 80–99.9)
MCV RBC AUTO: 106.6 FL (ref 80–99.9)
MCV RBC AUTO: 106.8 FL (ref 80–99.9)
MCV RBC AUTO: 107.1 FL (ref 80–99.9)
MCV RBC AUTO: 107.4 FL (ref 80–99.9)
MCV RBC AUTO: 108 FL (ref 80–99.9)
MCV RBC AUTO: 108.2 FL (ref 80–99.9)
MCV RBC AUTO: 108.9 FL (ref 80–99.9)
MCV RBC AUTO: 109 FL (ref 80–99.9)
MCV RBC AUTO: 109.6 FL (ref 80–99.9)
MCV RBC AUTO: 110.6 FL (ref 80–99.9)
MCV RBC AUTO: 111.3 FL (ref 80–99.9)
MCV RBC AUTO: 111.9 FL (ref 80–99.9)
MCV RBC AUTO: 112.6 FL (ref 80–99.9)
MCV RBC AUTO: 113.2 FL (ref 80–99.9)
MCV RBC AUTO: 114.5 FL (ref 80–99.9)
MCV RBC AUTO: 99.3 FL (ref 80–99.9)
METAMYELOCYTES RELATIVE PERCENT: 0.9 % (ref 0–1)
METER GLUCOSE: 111 MG/DL (ref 74–99)
METER GLUCOSE: 120 MG/DL (ref 74–99)
METER GLUCOSE: 126 MG/DL (ref 74–99)
METER GLUCOSE: 130 MG/DL (ref 74–99)
METER GLUCOSE: 130 MG/DL (ref 74–99)
METER GLUCOSE: 134 MG/DL (ref 74–99)
METER GLUCOSE: 135 MG/DL (ref 74–99)
METER GLUCOSE: 140 MG/DL (ref 74–99)
METER GLUCOSE: 143 MG/DL (ref 74–99)
METER GLUCOSE: 145 MG/DL (ref 74–99)
METER GLUCOSE: 148 MG/DL (ref 74–99)
METER GLUCOSE: 149 MG/DL (ref 74–99)
METER GLUCOSE: 153 MG/DL (ref 74–99)
METER GLUCOSE: 156 MG/DL (ref 74–99)
METER GLUCOSE: 156 MG/DL (ref 74–99)
METER GLUCOSE: 160 MG/DL (ref 74–99)
METER GLUCOSE: 164 MG/DL (ref 74–99)
METER GLUCOSE: 165 MG/DL (ref 74–99)
METER GLUCOSE: 166 MG/DL (ref 74–99)
METER GLUCOSE: 169 MG/DL (ref 74–99)
METER GLUCOSE: 171 MG/DL (ref 74–99)
METER GLUCOSE: 171 MG/DL (ref 74–99)
METER GLUCOSE: 172 MG/DL (ref 74–99)
METER GLUCOSE: 175 MG/DL (ref 74–99)
METER GLUCOSE: 176 MG/DL (ref 74–99)
METER GLUCOSE: 178 MG/DL (ref 74–99)
METER GLUCOSE: 179 MG/DL (ref 74–99)
METER GLUCOSE: 179 MG/DL (ref 74–99)
METER GLUCOSE: 181 MG/DL (ref 74–99)
METER GLUCOSE: 181 MG/DL (ref 74–99)
METER GLUCOSE: 183 MG/DL (ref 74–99)
METER GLUCOSE: 185 MG/DL (ref 74–99)
METER GLUCOSE: 187 MG/DL (ref 74–99)
METER GLUCOSE: 187 MG/DL (ref 74–99)
METER GLUCOSE: 188 MG/DL (ref 74–99)
METER GLUCOSE: 188 MG/DL (ref 74–99)
METER GLUCOSE: 189 MG/DL (ref 74–99)
METER GLUCOSE: 191 MG/DL (ref 74–99)
METER GLUCOSE: 191 MG/DL (ref 74–99)
METER GLUCOSE: 195 MG/DL (ref 74–99)
METER GLUCOSE: 196 MG/DL (ref 74–99)
METER GLUCOSE: 197 MG/DL (ref 74–99)
METER GLUCOSE: 199 MG/DL (ref 74–99)
METER GLUCOSE: 201 MG/DL (ref 74–99)
METER GLUCOSE: 201 MG/DL (ref 74–99)
METER GLUCOSE: 202 MG/DL (ref 74–99)
METER GLUCOSE: 207 MG/DL (ref 74–99)
METER GLUCOSE: 209 MG/DL (ref 74–99)
METER GLUCOSE: 209 MG/DL (ref 74–99)
METER GLUCOSE: 212 MG/DL (ref 74–99)
METER GLUCOSE: 214 MG/DL (ref 74–99)
METER GLUCOSE: 215 MG/DL (ref 74–99)
METER GLUCOSE: 215 MG/DL (ref 74–99)
METER GLUCOSE: 218 MG/DL (ref 74–99)
METER GLUCOSE: 220 MG/DL (ref 74–99)
METER GLUCOSE: 222 MG/DL (ref 74–99)
METER GLUCOSE: 225 MG/DL (ref 74–99)
METER GLUCOSE: 227 MG/DL (ref 74–99)
METER GLUCOSE: 243 MG/DL (ref 74–99)
METER GLUCOSE: 250 MG/DL (ref 74–99)
METER GLUCOSE: 250 MG/DL (ref 74–99)
METER GLUCOSE: 254 MG/DL (ref 74–99)
METER GLUCOSE: 298 MG/DL (ref 74–99)
MONOCYTES ABSOLUTE: 0.13 E9/L (ref 0.1–0.95)
MONOCYTES ABSOLUTE: 0.18 E9/L (ref 0.1–0.95)
MONOCYTES ABSOLUTE: 0.18 E9/L (ref 0.1–0.95)
MONOCYTES ABSOLUTE: 0.2 E9/L (ref 0.1–0.95)
MONOCYTES ABSOLUTE: 0.24 E9/L (ref 0.1–0.95)
MONOCYTES ABSOLUTE: 0.26 E9/L (ref 0.1–0.95)
MONOCYTES ABSOLUTE: 0.29 E9/L (ref 0.1–0.95)
MONOCYTES ABSOLUTE: 0.33 E9/L (ref 0.1–0.95)
MONOCYTES ABSOLUTE: 0.33 E9/L (ref 0.1–0.95)
MONOCYTES ABSOLUTE: 0.34 E9/L (ref 0.1–0.95)
MONOCYTES ABSOLUTE: 0.35 E9/L (ref 0.1–0.95)
MONOCYTES ABSOLUTE: 0.65 E9/L (ref 0.1–0.95)
MONOCYTES RELATIVE PERCENT: 10 % (ref 2–12)
MONOCYTES RELATIVE PERCENT: 12.2 % (ref 2–12)
MONOCYTES RELATIVE PERCENT: 3.4 % (ref 2–12)
MONOCYTES RELATIVE PERCENT: 3.5 % (ref 2–12)
MONOCYTES RELATIVE PERCENT: 4.4 % (ref 2–12)
MONOCYTES RELATIVE PERCENT: 5.2 % (ref 2–12)
MONOCYTES RELATIVE PERCENT: 5.2 % (ref 2–12)
MONOCYTES RELATIVE PERCENT: 6.1 % (ref 2–12)
MONOCYTES RELATIVE PERCENT: 6.8 % (ref 2–12)
MONOCYTES RELATIVE PERCENT: 7 % (ref 2–12)
MONOCYTES RELATIVE PERCENT: 7 % (ref 2–12)
MONOCYTES RELATIVE PERCENT: 7.8 % (ref 2–12)
MYCOPLASMA PNEUMONIAE BY PCR: NOT DETECTED
MYELOCYTE PERCENT: 0.8 % (ref 0–0)
MYELOCYTE PERCENT: 0.9 % (ref 0–0)
MYELOCYTE PERCENT: 1.7 % (ref 0–0)
MYELOCYTE PERCENT: 2.6 % (ref 0–0)
MYELOCYTE PERCENT: 5.2 % (ref 0–0)
MYELOCYTE PERCENT: 5.3 % (ref 0–0)
NEUTROPHILS ABSOLUTE: 1.56 E9/L (ref 1.8–7.3)
NEUTROPHILS ABSOLUTE: 2.01 E9/L (ref 1.8–7.3)
NEUTROPHILS ABSOLUTE: 2.09 E9/L (ref 1.8–7.3)
NEUTROPHILS ABSOLUTE: 2.14 E9/L (ref 1.8–7.3)
NEUTROPHILS ABSOLUTE: 2.88 E9/L (ref 1.8–7.3)
NEUTROPHILS ABSOLUTE: 2.9 E9/L (ref 1.8–7.3)
NEUTROPHILS ABSOLUTE: 2.97 E9/L (ref 1.8–7.3)
NEUTROPHILS ABSOLUTE: 2.99 E9/L (ref 1.8–7.3)
NEUTROPHILS ABSOLUTE: 3.08 E9/L (ref 1.8–7.3)
NEUTROPHILS ABSOLUTE: 3.28 E9/L (ref 1.8–7.3)
NEUTROPHILS ABSOLUTE: 3.33 E9/L (ref 1.8–7.3)
NEUTROPHILS ABSOLUTE: 3.4 E9/L (ref 1.8–7.3)
NEUTROPHILS ABSOLUTE: 3.7 E9/L (ref 1.8–7.3)
NEUTROPHILS ABSOLUTE: 3.71 E9/L (ref 1.8–7.3)
NEUTROPHILS ABSOLUTE: 5.88 E9/L (ref 1.8–7.3)
NEUTROPHILS ABSOLUTE: 7.13 E9/L (ref 1.8–7.3)
NEUTROPHILS RELATIVE PERCENT: 46 % (ref 43–80)
NEUTROPHILS RELATIVE PERCENT: 50.4 % (ref 43–80)
NEUTROPHILS RELATIVE PERCENT: 58.8 % (ref 43–80)
NEUTROPHILS RELATIVE PERCENT: 59.1 % (ref 43–80)
NEUTROPHILS RELATIVE PERCENT: 59.1 % (ref 43–80)
NEUTROPHILS RELATIVE PERCENT: 60.5 % (ref 43–80)
NEUTROPHILS RELATIVE PERCENT: 60.7 % (ref 43–80)
NEUTROPHILS RELATIVE PERCENT: 65.2 % (ref 43–80)
NEUTROPHILS RELATIVE PERCENT: 65.2 % (ref 43–80)
NEUTROPHILS RELATIVE PERCENT: 67 % (ref 43–80)
NEUTROPHILS RELATIVE PERCENT: 67.8 % (ref 43–80)
NEUTROPHILS RELATIVE PERCENT: 69.5 % (ref 43–80)
NEUTROPHILS RELATIVE PERCENT: 72.2 % (ref 43–80)
NEUTROPHILS RELATIVE PERCENT: 78.1 % (ref 43–80)
NEUTROPHILS RELATIVE PERCENT: 80 % (ref 43–80)
NEUTROPHILS RELATIVE PERCENT: 87 % (ref 43–80)
NITRITE, URINE: NEGATIVE
NITRITE, URINE: POSITIVE
NITRITE, URINE: POSITIVE
NUCLEATED RED BLOOD CELLS: 0.9 /100 WBC
NUCLEATED RED BLOOD CELLS: 1.7 /100 WBC
ORGANISM: ABNORMAL
OVALOCYTES: ABNORMAL
PARAINFLUENZA VIRUS 1 BY PCR: NOT DETECTED
PARAINFLUENZA VIRUS 2 BY PCR: NOT DETECTED
PARAINFLUENZA VIRUS 3 BY PCR: NOT DETECTED
PARAINFLUENZA VIRUS 4 BY PCR: NOT DETECTED
PATHOLOGIST REVIEW: NORMAL
PDW BLD-RTO: 15.1 FL (ref 11.5–15)
PDW BLD-RTO: 15.1 FL (ref 11.5–15)
PDW BLD-RTO: 15.2 FL (ref 11.5–15)
PDW BLD-RTO: 15.2 FL (ref 11.5–15)
PDW BLD-RTO: 15.4 FL (ref 11.5–15)
PDW BLD-RTO: 15.5 FL (ref 11.5–15)
PDW BLD-RTO: 15.8 FL (ref 11.5–15)
PDW BLD-RTO: 15.8 FL (ref 11.5–15)
PDW BLD-RTO: 15.9 FL (ref 11.5–15)
PDW BLD-RTO: 17.4 FL (ref 11.5–15)
PDW BLD-RTO: 17.4 FL (ref 11.5–15)
PDW BLD-RTO: 21 FL (ref 11.5–15)
PDW BLD-RTO: 21.4 FL (ref 11.5–15)
PDW BLD-RTO: 21.4 FL (ref 11.5–15)
PDW BLD-RTO: 21.6 FL (ref 11.5–15)
PDW BLD-RTO: 21.7 FL (ref 11.5–15)
PDW BLD-RTO: 21.8 FL (ref 11.5–15)
PDW BLD-RTO: 21.9 FL (ref 11.5–15)
PDW BLD-RTO: 22.5 FL (ref 11.5–15)
PDW BLD-RTO: 22.9 FL (ref 11.5–15)
PDW BLD-RTO: 23.1 FL (ref 11.5–15)
PDW BLD-RTO: 23.2 FL (ref 11.5–15)
PH UA: 5 (ref 5–9)
PH UA: 5.5 (ref 5–9)
PH UA: 8 (ref 5–9)
PHOSPHORUS: 3.6 MG/DL (ref 2.5–4.5)
PLATELET # BLD: 143 E9/L (ref 130–450)
PLATELET # BLD: 147 E9/L (ref 130–450)
PLATELET # BLD: 148 E9/L (ref 130–450)
PLATELET # BLD: 151 E9/L (ref 130–450)
PLATELET # BLD: 153 E9/L (ref 130–450)
PLATELET # BLD: 154 E9/L (ref 130–450)
PLATELET # BLD: 158 E9/L (ref 130–450)
PLATELET # BLD: 159 E9/L (ref 130–450)
PLATELET # BLD: 162 E9/L (ref 130–450)
PLATELET # BLD: 162 E9/L (ref 130–450)
PLATELET # BLD: 164 E9/L (ref 130–450)
PLATELET # BLD: 180 E9/L (ref 130–450)
PLATELET # BLD: 181 E9/L (ref 130–450)
PLATELET # BLD: 181 E9/L (ref 130–450)
PLATELET # BLD: 182 E9/L (ref 130–450)
PLATELET # BLD: 184 E9/L (ref 130–450)
PLATELET # BLD: 190 E9/L (ref 130–450)
PLATELET # BLD: 190 E9/L (ref 130–450)
PLATELET # BLD: 201 E9/L (ref 130–450)
PLATELET # BLD: 202 E9/L (ref 130–450)
PLATELET # BLD: 203 E9/L (ref 130–450)
PLATELET # BLD: 209 E9/L (ref 130–450)
PLATELET # BLD: 220 E9/L (ref 130–450)
PLATELET # BLD: 234 E9/L (ref 130–450)
PMV BLD AUTO: 10.1 FL (ref 7–12)
PMV BLD AUTO: 10.1 FL (ref 7–12)
PMV BLD AUTO: 10.3 FL (ref 7–12)
PMV BLD AUTO: 10.4 FL (ref 7–12)
PMV BLD AUTO: 10.5 FL (ref 7–12)
PMV BLD AUTO: 10.5 FL (ref 7–12)
PMV BLD AUTO: 10.6 FL (ref 7–12)
PMV BLD AUTO: 10.6 FL (ref 7–12)
PMV BLD AUTO: 10.7 FL (ref 7–12)
PMV BLD AUTO: 10.9 FL (ref 7–12)
PMV BLD AUTO: 11 FL (ref 7–12)
PMV BLD AUTO: 11.1 FL (ref 7–12)
PMV BLD AUTO: 11.3 FL (ref 7–12)
PMV BLD AUTO: 11.4 FL (ref 7–12)
PMV BLD AUTO: 9.9 FL (ref 7–12)
POIKILOCYTES: ABNORMAL
POLYCHROMASIA: ABNORMAL
POTASSIUM REFLEX MAGNESIUM: 3.8 MMOL/L (ref 3.5–5)
POTASSIUM REFLEX MAGNESIUM: 4 MMOL/L (ref 3.5–5)
POTASSIUM REFLEX MAGNESIUM: 4.2 MMOL/L (ref 3.5–5)
POTASSIUM SERPL-SCNC: 2.9 MMOL/L (ref 3.5–5)
POTASSIUM SERPL-SCNC: 3.3 MMOL/L (ref 3.5–5)
POTASSIUM SERPL-SCNC: 3.3 MMOL/L (ref 3.5–5)
POTASSIUM SERPL-SCNC: 3.4 MMOL/L (ref 3.5–5)
POTASSIUM SERPL-SCNC: 3.5 MMOL/L (ref 3.5–5)
POTASSIUM SERPL-SCNC: 3.5 MMOL/L (ref 3.5–5)
POTASSIUM SERPL-SCNC: 3.7 MMOL/L (ref 3.5–5)
POTASSIUM SERPL-SCNC: 3.8 MMOL/L (ref 3.5–5)
POTASSIUM SERPL-SCNC: 3.8 MMOL/L (ref 3.5–5)
POTASSIUM SERPL-SCNC: 3.9 MMOL/L (ref 3.5–5)
POTASSIUM SERPL-SCNC: 4 MMOL/L (ref 3.5–5)
POTASSIUM SERPL-SCNC: 4.1 MMOL/L (ref 3.5–5)
POTASSIUM SERPL-SCNC: 4.2 MMOL/L (ref 3.5–5)
POTASSIUM SERPL-SCNC: 4.3 MMOL/L (ref 3.5–5)
POTASSIUM SERPL-SCNC: 4.7 MMOL/L (ref 3.5–5)
PRO-BNP: 4623 PG/ML (ref 0–450)
PRO-BNP: ABNORMAL PG/ML (ref 0–450)
PROCALCITONIN: 0.12 NG/ML (ref 0–0.08)
PROMYELOCYTES PERCENT: 0.9 % (ref 0–0)
PROTEIN UA: 100 MG/DL
PROTEIN UA: NEGATIVE MG/DL
PROTEIN UA: NEGATIVE MG/DL
PROTHROMBIN TIME: 12.4 SEC (ref 9.3–12.4)
RBC # BLD: 1.69 E12/L (ref 3.8–5.8)
RBC # BLD: 1.74 E12/L (ref 3.8–5.8)
RBC # BLD: 1.76 E12/L (ref 3.8–5.8)
RBC # BLD: 1.82 E12/L (ref 3.8–5.8)
RBC # BLD: 1.89 E12/L (ref 3.8–5.8)
RBC # BLD: 1.97 E12/L (ref 3.8–5.8)
RBC # BLD: 1.98 E12/L (ref 3.8–5.8)
RBC # BLD: 1.98 E12/L (ref 3.8–5.8)
RBC # BLD: 2.06 E12/L (ref 3.8–5.8)
RBC # BLD: 2.08 E12/L (ref 3.8–5.8)
RBC # BLD: 2.13 E12/L (ref 3.8–5.8)
RBC # BLD: 2.15 E12/L (ref 3.8–5.8)
RBC # BLD: 2.2 E12/L (ref 3.8–5.8)
RBC # BLD: 2.21 E12/L (ref 3.8–5.8)
RBC # BLD: 2.21 E12/L (ref 3.8–5.8)
RBC # BLD: 2.24 E12/L (ref 3.8–5.8)
RBC # BLD: 2.28 E12/L (ref 3.8–5.8)
RBC # BLD: 2.3 E12/L (ref 3.8–5.8)
RBC # BLD: 2.35 E12/L (ref 3.8–5.8)
RBC # BLD: 2.45 E12/L (ref 3.8–5.8)
RBC # BLD: 2.63 E12/L (ref 3.8–5.8)
RBC # BLD: 2.82 E12/L (ref 3.8–5.8)
RBC # BLD: 2.85 E12/L (ref 3.8–5.8)
RBC # BLD: 2.87 E12/L (ref 3.8–5.8)
RBC UA: >20 /HPF (ref 0–2)
RBC UA: ABNORMAL /HPF (ref 0–2)
REASON FOR REJECTION: NORMAL
REJECTED TEST: NORMAL
RESPIRATORY SYNCYTIAL VIRUS BY PCR: NOT DETECTED
RETIC HGB EQUIVALENT: 44.2 PG (ref 28.2–36.6)
RETICULOCYTE ABSOLUTE COUNT: 0.09 E12/L
RETICULOCYTE COUNT PCT: 4.4 % (ref 0.4–1.9)
SARS-COV-2, NAAT: ABNORMAL
SARS-COV-2, NAAT: NOT DETECTED
SARS-COV-2, NAAT: NOT DETECTED
SARS-COV-2, PCR: NOT DETECTED
SCHISTOCYTES: ABNORMAL
SEDIMENTATION RATE, ERYTHROCYTE: 52 MM/HR (ref 0–15)
SODIUM BLD-SCNC: 133 MMOL/L (ref 132–146)
SODIUM BLD-SCNC: 133 MMOL/L (ref 132–146)
SODIUM BLD-SCNC: 134 MMOL/L (ref 132–146)
SODIUM BLD-SCNC: 135 MMOL/L (ref 132–146)
SODIUM BLD-SCNC: 135 MMOL/L (ref 132–146)
SODIUM BLD-SCNC: 136 MMOL/L (ref 132–146)
SODIUM BLD-SCNC: 137 MMOL/L (ref 132–146)
SODIUM BLD-SCNC: 138 MMOL/L (ref 132–146)
SODIUM BLD-SCNC: 139 MMOL/L (ref 132–146)
SODIUM BLD-SCNC: 139 MMOL/L (ref 132–146)
SODIUM BLD-SCNC: 140 MMOL/L (ref 132–146)
SODIUM BLD-SCNC: 141 MMOL/L (ref 132–146)
SODIUM BLD-SCNC: 142 MMOL/L (ref 132–146)
SODIUM BLD-SCNC: 142 MMOL/L (ref 132–146)
SODIUM BLD-SCNC: 143 MMOL/L (ref 132–146)
SODIUM BLD-SCNC: 143 MMOL/L (ref 132–146)
SODIUM BLD-SCNC: 149 MMOL/L (ref 132–146)
SPECIFIC GRAVITY UA: 1.01 (ref 1–1.03)
SPECIFIC GRAVITY UA: 1.01 (ref 1–1.03)
SPECIFIC GRAVITY UA: >=1.03 (ref 1–1.03)
SPHEROCYTES: ABNORMAL
SPHEROCYTES: ABNORMAL
T4 FREE: 1.45 NG/DL (ref 0.93–1.7)
TARGET CELLS: ABNORMAL
TEAR DROP CELLS: ABNORMAL
TOTAL CK: 339 U/L (ref 20–200)
TOTAL IRON BINDING CAPACITY: 268 MCG/DL (ref 250–450)
TOTAL PROTEIN: 5.6 G/DL (ref 6.4–8.3)
TOTAL PROTEIN: 5.9 G/DL (ref 6.4–8.3)
TOTAL PROTEIN: 6 G/DL (ref 6.4–8.3)
TOTAL PROTEIN: 6 G/DL (ref 6.4–8.3)
TOTAL PROTEIN: 6.1 G/DL (ref 6.4–8.3)
TOTAL PROTEIN: 6.1 G/DL (ref 6.4–8.3)
TOTAL PROTEIN: 6.3 G/DL (ref 6.4–8.3)
TOTAL PROTEIN: 6.4 G/DL (ref 6.4–8.3)
TOTAL PROTEIN: 6.6 G/DL (ref 6.4–8.3)
TOTAL PROTEIN: 6.6 G/DL (ref 6.4–8.3)
TOTAL PROTEIN: 6.7 G/DL (ref 6.4–8.3)
TOTAL PROTEIN: 6.8 G/DL (ref 6.4–8.3)
TOTAL PROTEIN: 6.9 G/DL (ref 6.4–8.3)
TOTAL PROTEIN: 7.1 G/DL (ref 6.4–8.3)
TOTAL PROTEIN: 7.1 G/DL (ref 6.4–8.3)
TOTAL PROTEIN: 7.2 G/DL (ref 6.4–8.3)
TRIGL SERPL-MCNC: 186 MG/DL (ref 0–149)
TROPONIN, HIGH SENSITIVITY: 163 NG/L (ref 0–11)
TROPONIN, HIGH SENSITIVITY: 1671 NG/L (ref 0–11)
TROPONIN, HIGH SENSITIVITY: 168 NG/L (ref 0–11)
TROPONIN, HIGH SENSITIVITY: 1903 NG/L (ref 0–11)
TROPONIN, HIGH SENSITIVITY: 63 NG/L (ref 0–11)
TROPONIN, HIGH SENSITIVITY: 63 NG/L (ref 0–11)
TROPONIN, HIGH SENSITIVITY: 76 NG/L (ref 0–11)
TROPONIN, HIGH SENSITIVITY: 781 NG/L (ref 0–11)
TROPONIN, HIGH SENSITIVITY: 83 NG/L (ref 0–11)
TROPONIN, HIGH SENSITIVITY: 87 NG/L (ref 0–11)
TSH SERPL DL<=0.05 MIU/L-ACNC: 2.6 UIU/ML (ref 0.27–4.2)
URINE CULTURE, ROUTINE: ABNORMAL
URINE CULTURE, ROUTINE: NORMAL
UROBILINOGEN, URINE: 0.2 E.U./DL
UROBILINOGEN, URINE: 0.2 E.U./DL
UROBILINOGEN, URINE: 1 E.U./DL
VITAMIN B-12: >2000 PG/ML (ref 211–946)
VLDLC SERPL CALC-MCNC: 37 MG/DL
WBC # BLD: 3.4 E9/L (ref 4.5–11.5)
WBC # BLD: 3.4 E9/L (ref 4.5–11.5)
WBC # BLD: 3.5 E9/L (ref 4.5–11.5)
WBC # BLD: 4.1 E9/L (ref 4.5–11.5)
WBC # BLD: 4.2 E9/L (ref 4.5–11.5)
WBC # BLD: 4.4 E9/L (ref 4.5–11.5)
WBC # BLD: 4.4 E9/L (ref 4.5–11.5)
WBC # BLD: 4.5 E9/L (ref 4.5–11.5)
WBC # BLD: 4.5 E9/L (ref 4.5–11.5)
WBC # BLD: 4.7 E9/L (ref 4.5–11.5)
WBC # BLD: 4.9 E9/L (ref 4.5–11.5)
WBC # BLD: 4.9 E9/L (ref 4.5–11.5)
WBC # BLD: 5 E9/L (ref 4.5–11.5)
WBC # BLD: 5.4 E9/L (ref 4.5–11.5)
WBC # BLD: 5.5 E9/L (ref 4.5–11.5)
WBC # BLD: 5.8 E9/L (ref 4.5–11.5)
WBC # BLD: 5.9 E9/L (ref 4.5–11.5)
WBC # BLD: 6 E9/L (ref 4.5–11.5)
WBC # BLD: 6.2 E9/L (ref 4.5–11.5)
WBC # BLD: 7 E9/L (ref 4.5–11.5)
WBC # BLD: 8.2 E9/L (ref 4.5–11.5)
WBC # BLD: 8.4 E9/L (ref 4.5–11.5)
WBC UA: ABNORMAL /HPF (ref 0–5)
WBC UA: ABNORMAL /HPF (ref 0–5)

## 2021-01-01 PROCEDURE — 6370000000 HC RX 637 (ALT 250 FOR IP): Performed by: INTERNAL MEDICINE

## 2021-01-01 PROCEDURE — 2700000000 HC OXYGEN THERAPY PER DAY

## 2021-01-01 PROCEDURE — 2060000000 HC ICU INTERMEDIATE R&B

## 2021-01-01 PROCEDURE — 6360000002 HC RX W HCPCS: Performed by: EMERGENCY MEDICINE

## 2021-01-01 PROCEDURE — 94660 CPAP INITIATION&MGMT: CPT

## 2021-01-01 PROCEDURE — 80053 COMPREHEN METABOLIC PANEL: CPT

## 2021-01-01 PROCEDURE — 82746 ASSAY OF FOLIC ACID SERUM: CPT

## 2021-01-01 PROCEDURE — 94640 AIRWAY INHALATION TREATMENT: CPT

## 2021-01-01 PROCEDURE — 87088 URINE BACTERIA CULTURE: CPT

## 2021-01-01 PROCEDURE — 2580000003 HC RX 258: Performed by: INTERNAL MEDICINE

## 2021-01-01 PROCEDURE — 82607 VITAMIN B-12: CPT

## 2021-01-01 PROCEDURE — 71045 X-RAY EXAM CHEST 1 VIEW: CPT

## 2021-01-01 PROCEDURE — 84484 ASSAY OF TROPONIN QUANT: CPT

## 2021-01-01 PROCEDURE — 2140000000 HC CCU INTERMEDIATE R&B

## 2021-01-01 PROCEDURE — 93010 ELECTROCARDIOGRAM REPORT: CPT | Performed by: INTERNAL MEDICINE

## 2021-01-01 PROCEDURE — 84443 ASSAY THYROID STIM HORMONE: CPT

## 2021-01-01 PROCEDURE — 93458 L HRT ARTERY/VENTRICLE ANGIO: CPT | Performed by: INTERNAL MEDICINE

## 2021-01-01 PROCEDURE — APPSS60 APP SPLIT SHARED TIME 46-60 MINUTES: Performed by: NURSE PRACTITIONER

## 2021-01-01 PROCEDURE — 86850 RBC ANTIBODY SCREEN: CPT

## 2021-01-01 PROCEDURE — 93308 TTE F-UP OR LMTD: CPT

## 2021-01-01 PROCEDURE — 36415 COLL VENOUS BLD VENIPUNCTURE: CPT

## 2021-01-01 PROCEDURE — 82962 GLUCOSE BLOOD TEST: CPT

## 2021-01-01 PROCEDURE — 85027 COMPLETE CBC AUTOMATED: CPT

## 2021-01-01 PROCEDURE — 83605 ASSAY OF LACTIC ACID: CPT

## 2021-01-01 PROCEDURE — 97530 THERAPEUTIC ACTIVITIES: CPT

## 2021-01-01 PROCEDURE — 85025 COMPLETE CBC W/AUTO DIFF WBC: CPT

## 2021-01-01 PROCEDURE — 86923 COMPATIBILITY TEST ELECTRIC: CPT

## 2021-01-01 PROCEDURE — 6360000002 HC RX W HCPCS: Performed by: INTERNAL MEDICINE

## 2021-01-01 PROCEDURE — 81001 URINALYSIS AUTO W/SCOPE: CPT

## 2021-01-01 PROCEDURE — G0269 OCCLUSIVE DEVICE IN VEIN ART: HCPCS | Performed by: INTERNAL MEDICINE

## 2021-01-01 PROCEDURE — 2580000003 HC RX 258

## 2021-01-01 PROCEDURE — 36430 TRANSFUSION BLD/BLD COMPNT: CPT

## 2021-01-01 PROCEDURE — 93005 ELECTROCARDIOGRAM TRACING: CPT | Performed by: EMERGENCY MEDICINE

## 2021-01-01 PROCEDURE — 96374 THER/PROPH/DIAG INJ IV PUSH: CPT

## 2021-01-01 PROCEDURE — 86901 BLOOD TYPING SEROLOGIC RH(D): CPT

## 2021-01-01 PROCEDURE — 97161 PT EVAL LOW COMPLEX 20 MIN: CPT

## 2021-01-01 PROCEDURE — 85610 PROTHROMBIN TIME: CPT

## 2021-01-01 PROCEDURE — 93005 ELECTROCARDIOGRAM TRACING: CPT | Performed by: INTERNAL MEDICINE

## 2021-01-01 PROCEDURE — C1894 INTRO/SHEATH, NON-LASER: HCPCS

## 2021-01-01 PROCEDURE — 83735 ASSAY OF MAGNESIUM: CPT

## 2021-01-01 PROCEDURE — 84100 ASSAY OF PHOSPHORUS: CPT

## 2021-01-01 PROCEDURE — 80048 BASIC METABOLIC PNL TOTAL CA: CPT

## 2021-01-01 PROCEDURE — 6370000000 HC RX 637 (ALT 250 FOR IP): Performed by: STUDENT IN AN ORGANIZED HEALTH CARE EDUCATION/TRAINING PROGRAM

## 2021-01-01 PROCEDURE — 85730 THROMBOPLASTIN TIME PARTIAL: CPT

## 2021-01-01 PROCEDURE — 99232 SBSQ HOSP IP/OBS MODERATE 35: CPT | Performed by: INTERNAL MEDICINE

## 2021-01-01 PROCEDURE — 2500000003 HC RX 250 WO HCPCS: Performed by: INTERNAL MEDICINE

## 2021-01-01 PROCEDURE — 85018 HEMOGLOBIN: CPT

## 2021-01-01 PROCEDURE — 72125 CT NECK SPINE W/O DYE: CPT

## 2021-01-01 PROCEDURE — 99284 EMERGENCY DEPT VISIT MOD MDM: CPT

## 2021-01-01 PROCEDURE — 85014 HEMATOCRIT: CPT

## 2021-01-01 PROCEDURE — 97165 OT EVAL LOW COMPLEX 30 MIN: CPT

## 2021-01-01 PROCEDURE — 84145 PROCALCITONIN (PCT): CPT

## 2021-01-01 PROCEDURE — C1760 CLOSURE DEV, VASC: HCPCS

## 2021-01-01 PROCEDURE — 96372 THER/PROPH/DIAG INJ SC/IM: CPT

## 2021-01-01 PROCEDURE — 99285 EMERGENCY DEPT VISIT HI MDM: CPT

## 2021-01-01 PROCEDURE — 99223 1ST HOSP IP/OBS HIGH 75: CPT | Performed by: INTERNAL MEDICINE

## 2021-01-01 PROCEDURE — 99233 SBSQ HOSP IP/OBS HIGH 50: CPT | Performed by: INTERNAL MEDICINE

## 2021-01-01 PROCEDURE — 51701 INSERT BLADDER CATHETER: CPT

## 2021-01-01 PROCEDURE — 99283 EMERGENCY DEPT VISIT LOW MDM: CPT

## 2021-01-01 PROCEDURE — 6370000000 HC RX 637 (ALT 250 FOR IP): Performed by: NURSE PRACTITIONER

## 2021-01-01 PROCEDURE — 99231 SBSQ HOSP IP/OBS SF/LOW 25: CPT | Performed by: FAMILY MEDICINE

## 2021-01-01 PROCEDURE — 87186 SC STD MICRODIL/AGAR DIL: CPT

## 2021-01-01 PROCEDURE — B2111ZZ FLUOROSCOPY OF MULTIPLE CORONARY ARTERIES USING LOW OSMOLAR CONTRAST: ICD-10-PCS | Performed by: INTERNAL MEDICINE

## 2021-01-01 PROCEDURE — 86900 BLOOD TYPING SEROLOGIC ABO: CPT

## 2021-01-01 PROCEDURE — 6370000000 HC RX 637 (ALT 250 FOR IP): Performed by: EMERGENCY MEDICINE

## 2021-01-01 PROCEDURE — 71046 X-RAY EXAM CHEST 2 VIEWS: CPT

## 2021-01-01 PROCEDURE — 2580000003 HC RX 258: Performed by: EMERGENCY MEDICINE

## 2021-01-01 PROCEDURE — P9016 RBC LEUKOCYTES REDUCED: HCPCS

## 2021-01-01 PROCEDURE — 82728 ASSAY OF FERRITIN: CPT

## 2021-01-01 PROCEDURE — 83880 ASSAY OF NATRIURETIC PEPTIDE: CPT

## 2021-01-01 PROCEDURE — 97162 PT EVAL MOD COMPLEX 30 MIN: CPT

## 2021-01-01 PROCEDURE — 84439 ASSAY OF FREE THYROXINE: CPT

## 2021-01-01 PROCEDURE — 99221 1ST HOSP IP/OBS SF/LOW 40: CPT | Performed by: NURSE PRACTITIONER

## 2021-01-01 PROCEDURE — 0001A COVID-19, PFIZER VACCINE 30MCG/0.3ML DOSE: CPT | Performed by: NURSE PRACTITIONER

## 2021-01-01 PROCEDURE — 99233 SBSQ HOSP IP/OBS HIGH 50: CPT | Performed by: FAMILY MEDICINE

## 2021-01-01 PROCEDURE — 82553 CREATINE MB FRACTION: CPT

## 2021-01-01 PROCEDURE — 4A023N7 MEASUREMENT OF CARDIAC SAMPLING AND PRESSURE, LEFT HEART, PERCUTANEOUS APPROACH: ICD-10-PCS | Performed by: INTERNAL MEDICINE

## 2021-01-01 PROCEDURE — 94760 N-INVAS EAR/PLS OXIMETRY 1: CPT

## 2021-01-01 PROCEDURE — 6360000002 HC RX W HCPCS

## 2021-01-01 PROCEDURE — B2151ZZ FLUOROSCOPY OF LEFT HEART USING LOW OSMOLAR CONTRAST: ICD-10-PCS | Performed by: INTERNAL MEDICINE

## 2021-01-01 PROCEDURE — 83540 ASSAY OF IRON: CPT

## 2021-01-01 PROCEDURE — 70450 CT HEAD/BRAIN W/O DYE: CPT

## 2021-01-01 PROCEDURE — 2709999900 HC NON-CHARGEABLE SUPPLY

## 2021-01-01 PROCEDURE — 85045 AUTOMATED RETICULOCYTE COUNT: CPT

## 2021-01-01 PROCEDURE — 0002A COVID-19, PFIZER VACCINE 30MCG/0.3ML DOSE: CPT | Performed by: NURSE PRACTITIONER

## 2021-01-01 PROCEDURE — 91300 COVID-19, PFIZER VACCINE 30MCG/0.3ML DOSE: CPT | Performed by: NURSE PRACTITIONER

## 2021-01-01 PROCEDURE — 83550 IRON BINDING TEST: CPT

## 2021-01-01 PROCEDURE — 86140 C-REACTIVE PROTEIN: CPT

## 2021-01-01 PROCEDURE — 83615 LACTATE (LD) (LDH) ENZYME: CPT

## 2021-01-01 PROCEDURE — 2500000003 HC RX 250 WO HCPCS

## 2021-01-01 PROCEDURE — 97535 SELF CARE MNGMENT TRAINING: CPT

## 2021-01-01 PROCEDURE — 36140 INTRO NDL ICATH UPR/LXTR ART: CPT | Performed by: SURGERY

## 2021-01-01 PROCEDURE — 85651 RBC SED RATE NONAUTOMATED: CPT

## 2021-01-01 PROCEDURE — 99222 1ST HOSP IP/OBS MODERATE 55: CPT | Performed by: THORACIC SURGERY (CARDIOTHORACIC VASCULAR SURGERY)

## 2021-01-01 PROCEDURE — 0202U NFCT DS 22 TRGT SARS-COV-2: CPT

## 2021-01-01 PROCEDURE — 97166 OT EVAL MOD COMPLEX 45 MIN: CPT

## 2021-01-01 PROCEDURE — 51798 US URINE CAPACITY MEASURE: CPT

## 2021-01-01 PROCEDURE — 6360000004 HC RX CONTRAST MEDICATION: Performed by: INTERNAL MEDICINE

## 2021-01-01 PROCEDURE — 87635 SARS-COV-2 COVID-19 AMP PRB: CPT

## 2021-01-01 PROCEDURE — 82550 ASSAY OF CK (CPK): CPT

## 2021-01-01 PROCEDURE — 85378 FIBRIN DEGRADE SEMIQUANT: CPT

## 2021-01-01 PROCEDURE — C1769 GUIDE WIRE: HCPCS

## 2021-01-01 PROCEDURE — 04JY3ZZ INSPECTION OF LOWER ARTERY, PERCUTANEOUS APPROACH: ICD-10-PCS | Performed by: SURGERY

## 2021-01-01 PROCEDURE — 76937 US GUIDE VASCULAR ACCESS: CPT | Performed by: SURGERY

## 2021-01-01 PROCEDURE — 6360000002 HC RX W HCPCS: Performed by: STUDENT IN AN ORGANIZED HEALTH CARE EDUCATION/TRAINING PROGRAM

## 2021-01-01 RX ORDER — BISACODYL 10 MG
10 SUPPOSITORY, RECTAL RECTAL PRN
Status: DISCONTINUED | OUTPATIENT
Start: 2021-01-01 | End: 2021-01-01 | Stop reason: HOSPADM

## 2021-01-01 RX ORDER — ERGOCALCIFEROL 1.25 MG/1
50000 CAPSULE ORAL WEEKLY
Status: DISCONTINUED | OUTPATIENT
Start: 2021-01-01 | End: 2021-01-01 | Stop reason: HOSPADM

## 2021-01-01 RX ORDER — ASPIRIN 81 MG/1
81 TABLET ORAL NIGHTLY
Status: DISCONTINUED | OUTPATIENT
Start: 2021-01-01 | End: 2021-01-01 | Stop reason: HOSPADM

## 2021-01-01 RX ORDER — SODIUM CHLORIDE 9 MG/ML
INJECTION, SOLUTION INTRAVENOUS CONTINUOUS
Status: DISCONTINUED | OUTPATIENT
Start: 2021-01-01 | End: 2021-01-01

## 2021-01-01 RX ORDER — FUROSEMIDE 10 MG/ML
20 INJECTION INTRAMUSCULAR; INTRAVENOUS ONCE
Status: DISCONTINUED | OUTPATIENT
Start: 2021-01-01 | End: 2021-01-01 | Stop reason: HOSPADM

## 2021-01-01 RX ORDER — DOCUSATE SODIUM 100 MG/1
100 CAPSULE, LIQUID FILLED ORAL 2 TIMES DAILY
Status: DISCONTINUED | OUTPATIENT
Start: 2021-01-01 | End: 2021-01-01 | Stop reason: HOSPADM

## 2021-01-01 RX ORDER — QUETIAPINE FUMARATE 25 MG/1
25 TABLET, FILM COATED ORAL NIGHTLY
Qty: 60 TABLET | Refills: 3 | DISCHARGE
Start: 2021-01-01 | End: 2021-01-01

## 2021-01-01 RX ORDER — LATANOPROST 50 UG/ML
1 SOLUTION/ DROPS OPHTHALMIC NIGHTLY
Status: DISCONTINUED | OUTPATIENT
Start: 2021-01-01 | End: 2021-01-01 | Stop reason: HOSPADM

## 2021-01-01 RX ORDER — PANTOPRAZOLE SODIUM 40 MG/1
40 TABLET, DELAYED RELEASE ORAL
Status: DISCONTINUED | OUTPATIENT
Start: 2021-01-01 | End: 2021-01-01 | Stop reason: HOSPADM

## 2021-01-01 RX ORDER — 0.9 % SODIUM CHLORIDE 0.9 %
1000 INTRAVENOUS SOLUTION INTRAVENOUS ONCE
Status: DISCONTINUED | OUTPATIENT
Start: 2021-01-01 | End: 2021-01-01

## 2021-01-01 RX ORDER — OXYBUTYNIN CHLORIDE 15 MG/1
15 TABLET, EXTENDED RELEASE ORAL DAILY
Status: ON HOLD | COMMUNITY
End: 2021-01-01 | Stop reason: HOSPADM

## 2021-01-01 RX ORDER — AMOXICILLIN 500 MG
1 CAPSULE ORAL
Status: DISCONTINUED | OUTPATIENT
Start: 2021-01-01 | End: 2021-01-01 | Stop reason: CLARIF

## 2021-01-01 RX ORDER — TAMSULOSIN HYDROCHLORIDE 0.4 MG/1
0.4 CAPSULE ORAL DAILY
Status: DISCONTINUED | OUTPATIENT
Start: 2021-01-01 | End: 2021-01-01 | Stop reason: HOSPADM

## 2021-01-01 RX ORDER — LANOLIN ALCOHOL/MO/W.PET/CERES
6 CREAM (GRAM) TOPICAL NIGHTLY PRN
COMMUNITY

## 2021-01-01 RX ORDER — SODIUM CHLORIDE 0.9 % (FLUSH) 0.9 %
5-40 SYRINGE (ML) INJECTION EVERY 12 HOURS SCHEDULED
Status: DISCONTINUED | OUTPATIENT
Start: 2021-01-01 | End: 2021-01-01 | Stop reason: HOSPADM

## 2021-01-01 RX ORDER — ISOSORBIDE MONONITRATE 60 MG/1
60 TABLET, EXTENDED RELEASE ORAL DAILY
Status: DISCONTINUED | OUTPATIENT
Start: 2021-01-01 | End: 2021-01-01 | Stop reason: HOSPADM

## 2021-01-01 RX ORDER — NICOTINE POLACRILEX 4 MG
15 LOZENGE BUCCAL PRN
Status: DISCONTINUED | OUTPATIENT
Start: 2021-01-01 | End: 2021-01-01 | Stop reason: HOSPADM

## 2021-01-01 RX ORDER — CLOPIDOGREL BISULFATE 75 MG/1
75 TABLET ORAL DAILY
Qty: 30 TABLET | Refills: 3 | DISCHARGE
Start: 2021-01-01 | End: 2021-01-01 | Stop reason: SDUPTHER

## 2021-01-01 RX ORDER — LORAZEPAM 2 MG/ML
0.5 INJECTION INTRAMUSCULAR ONCE
Status: COMPLETED | OUTPATIENT
Start: 2021-01-01 | End: 2021-01-01

## 2021-01-01 RX ORDER — FUROSEMIDE 20 MG/1
20 TABLET ORAL DAILY
Qty: 60 TABLET | Refills: 3 | DISCHARGE
Start: 2021-01-01 | End: 2021-01-01 | Stop reason: SDUPTHER

## 2021-01-01 RX ORDER — DEXTROSE MONOHYDRATE 50 MG/ML
100 INJECTION, SOLUTION INTRAVENOUS PRN
Status: DISCONTINUED | OUTPATIENT
Start: 2021-01-01 | End: 2021-01-01 | Stop reason: HOSPADM

## 2021-01-01 RX ORDER — POTASSIUM CHLORIDE 20 MEQ/1
20 TABLET, EXTENDED RELEASE ORAL DAILY
Status: DISCONTINUED | OUTPATIENT
Start: 2021-01-01 | End: 2021-01-01 | Stop reason: HOSPADM

## 2021-01-01 RX ORDER — DEXTROSE MONOHYDRATE 25 G/50ML
12.5 INJECTION, SOLUTION INTRAVENOUS PRN
Status: DISCONTINUED | OUTPATIENT
Start: 2021-01-01 | End: 2021-01-01 | Stop reason: HOSPADM

## 2021-01-01 RX ORDER — LORAZEPAM 2 MG/ML
0.5 INJECTION INTRAMUSCULAR ONCE
Status: DISCONTINUED | OUTPATIENT
Start: 2021-01-01 | End: 2021-01-01

## 2021-01-01 RX ORDER — HALOPERIDOL 5 MG/ML
2 INJECTION INTRAMUSCULAR ONCE
Status: COMPLETED | OUTPATIENT
Start: 2021-01-01 | End: 2021-01-01

## 2021-01-01 RX ORDER — ASPIRIN 81 MG/1
324 TABLET, CHEWABLE ORAL ONCE
Status: COMPLETED | OUTPATIENT
Start: 2021-01-01 | End: 2021-01-01

## 2021-01-01 RX ORDER — SODIUM CHLORIDE 0.9 % (FLUSH) 0.9 %
SYRINGE (ML) INJECTION
Status: COMPLETED
Start: 2021-01-01 | End: 2021-01-01

## 2021-01-01 RX ORDER — ATORVASTATIN CALCIUM 40 MG/1
40 TABLET, FILM COATED ORAL DAILY
COMMUNITY

## 2021-01-01 RX ORDER — ATORVASTATIN CALCIUM 40 MG/1
40 TABLET, FILM COATED ORAL DAILY
Status: DISCONTINUED | OUTPATIENT
Start: 2021-01-01 | End: 2021-01-01 | Stop reason: HOSPADM

## 2021-01-01 RX ORDER — CLOPIDOGREL BISULFATE 75 MG/1
TABLET ORAL
Status: DISPENSED
Start: 2021-01-01 | End: 2021-01-01

## 2021-01-01 RX ORDER — METFORMIN HYDROCHLORIDE 500 MG/1
500 TABLET, EXTENDED RELEASE ORAL
Status: DISCONTINUED | OUTPATIENT
Start: 2021-01-01 | End: 2021-01-01 | Stop reason: HOSPADM

## 2021-01-01 RX ORDER — CARVEDILOL 6.25 MG/1
6.25 TABLET ORAL 2 TIMES DAILY WITH MEALS
Qty: 60 TABLET | Refills: 3 | DISCHARGE
Start: 2021-01-01 | End: 2021-01-01 | Stop reason: SDUPTHER

## 2021-01-01 RX ORDER — DIVALPROEX SODIUM 125 MG/1
125 TABLET, DELAYED RELEASE ORAL 3 TIMES DAILY
COMMUNITY

## 2021-01-01 RX ORDER — HEPARIN SODIUM 1000 [USP'U]/ML
2000 INJECTION, SOLUTION INTRAVENOUS; SUBCUTANEOUS PRN
Status: DISCONTINUED | OUTPATIENT
Start: 2021-01-01 | End: 2021-01-01

## 2021-01-01 RX ORDER — VITAMIN E 268 MG
180 CAPSULE ORAL DAILY
COMMUNITY

## 2021-01-01 RX ORDER — 0.9 % SODIUM CHLORIDE 0.9 %
250 INTRAVENOUS SOLUTION INTRAVENOUS ONCE
Status: DISCONTINUED | OUTPATIENT
Start: 2021-01-01 | End: 2021-01-01

## 2021-01-01 RX ORDER — POTASSIUM CHLORIDE 20 MEQ/1
20 TABLET, EXTENDED RELEASE ORAL DAILY
Qty: 60 TABLET | Refills: 3 | Status: SHIPPED | OUTPATIENT
Start: 2021-01-01

## 2021-01-01 RX ORDER — METOPROLOL TARTRATE 50 MG/1
50 TABLET, FILM COATED ORAL DAILY
Status: DISCONTINUED | OUTPATIENT
Start: 2021-01-01 | End: 2021-01-01 | Stop reason: HOSPADM

## 2021-01-01 RX ORDER — HYDROXYZINE PAMOATE 50 MG/1
50 CAPSULE ORAL NIGHTLY
Status: DISCONTINUED | OUTPATIENT
Start: 2021-01-01 | End: 2021-01-01

## 2021-01-01 RX ORDER — AMLODIPINE BESYLATE 5 MG/1
5 TABLET ORAL DAILY
Status: DISCONTINUED | OUTPATIENT
Start: 2021-01-01 | End: 2021-01-01

## 2021-01-01 RX ORDER — FUROSEMIDE 20 MG/1
20 TABLET ORAL DAILY
Status: DISCONTINUED | OUTPATIENT
Start: 2021-01-01 | End: 2021-01-01 | Stop reason: HOSPADM

## 2021-01-01 RX ORDER — LANOLIN ALCOHOL/MO/W.PET/CERES
1000 CREAM (GRAM) TOPICAL DAILY
Status: DISCONTINUED | OUTPATIENT
Start: 2021-01-01 | End: 2021-01-01 | Stop reason: HOSPADM

## 2021-01-01 RX ORDER — 0.9 % SODIUM CHLORIDE 0.9 %
1000 INTRAVENOUS SOLUTION INTRAVENOUS ONCE
Status: COMPLETED | OUTPATIENT
Start: 2021-01-01 | End: 2021-01-01

## 2021-01-01 RX ORDER — TAMSULOSIN HYDROCHLORIDE 0.4 MG/1
0.4 CAPSULE ORAL NIGHTLY
COMMUNITY

## 2021-01-01 RX ORDER — CARVEDILOL 6.25 MG/1
6.25 TABLET ORAL 2 TIMES DAILY WITH MEALS
Status: DISCONTINUED | OUTPATIENT
Start: 2021-01-01 | End: 2021-01-01 | Stop reason: HOSPADM

## 2021-01-01 RX ORDER — ASCORBIC ACID 500 MG
500 TABLET ORAL DAILY
Status: DISCONTINUED | OUTPATIENT
Start: 2021-01-01 | End: 2021-01-01 | Stop reason: HOSPADM

## 2021-01-01 RX ORDER — LORAZEPAM 2 MG/ML
0.5 INJECTION INTRAMUSCULAR ONCE
Status: DISCONTINUED | OUTPATIENT
Start: 2021-01-01 | End: 2021-01-01 | Stop reason: HOSPADM

## 2021-01-01 RX ORDER — GUAIFENESIN/DEXTROMETHORPHAN 100-10MG/5
5 SYRUP ORAL EVERY 4 HOURS PRN
Status: DISCONTINUED | OUTPATIENT
Start: 2021-01-01 | End: 2021-01-01 | Stop reason: HOSPADM

## 2021-01-01 RX ORDER — IPRATROPIUM BROMIDE AND ALBUTEROL SULFATE 2.5; .5 MG/3ML; MG/3ML
1 SOLUTION RESPIRATORY (INHALATION)
Status: DISCONTINUED | OUTPATIENT
Start: 2021-01-01 | End: 2021-01-01

## 2021-01-01 RX ORDER — HEPARIN SODIUM 1000 [USP'U]/ML
4000 INJECTION, SOLUTION INTRAVENOUS; SUBCUTANEOUS ONCE
Status: COMPLETED | OUTPATIENT
Start: 2021-01-01 | End: 2021-01-01

## 2021-01-01 RX ORDER — FUROSEMIDE 40 MG/1
40 TABLET ORAL DAILY
COMMUNITY

## 2021-01-01 RX ORDER — DIMENHYDRINATE 50 MG
100 TABLET ORAL NIGHTLY
Status: DISCONTINUED | OUTPATIENT
Start: 2021-01-01 | End: 2021-01-01 | Stop reason: CLARIF

## 2021-01-01 RX ORDER — CLOPIDOGREL BISULFATE 75 MG/1
75 TABLET ORAL DAILY
Status: DISCONTINUED | OUTPATIENT
Start: 2021-01-01 | End: 2021-01-01 | Stop reason: HOSPADM

## 2021-01-01 RX ORDER — ACETAMINOPHEN 500 MG
500 TABLET ORAL EVERY 4 HOURS PRN
Status: DISCONTINUED | OUTPATIENT
Start: 2021-01-01 | End: 2021-01-01 | Stop reason: HOSPADM

## 2021-01-01 RX ORDER — DROPERIDOL 2.5 MG/ML
1.25 INJECTION, SOLUTION INTRAMUSCULAR; INTRAVENOUS ONCE
Status: COMPLETED | OUTPATIENT
Start: 2021-01-01 | End: 2021-01-01

## 2021-01-01 RX ORDER — ASPIRIN 81 MG/1
324 TABLET, CHEWABLE ORAL ONCE
Status: DISCONTINUED | OUTPATIENT
Start: 2021-01-01 | End: 2021-01-01

## 2021-01-01 RX ORDER — FUROSEMIDE 10 MG/ML
40 INJECTION INTRAMUSCULAR; INTRAVENOUS ONCE
Status: COMPLETED | OUTPATIENT
Start: 2021-01-01 | End: 2021-01-01

## 2021-01-01 RX ORDER — SODIUM CHLORIDE 9 MG/ML
INJECTION, SOLUTION INTRAVENOUS CONTINUOUS
Status: ACTIVE | OUTPATIENT
Start: 2021-01-01 | End: 2021-01-01

## 2021-01-01 RX ORDER — BISACODYL 10 MG
10 SUPPOSITORY, RECTAL RECTAL DAILY PRN
COMMUNITY

## 2021-01-01 RX ORDER — CEFDINIR 300 MG/1
300 CAPSULE ORAL 2 TIMES DAILY
Qty: 20 CAPSULE | Refills: 0 | Status: SHIPPED | OUTPATIENT
Start: 2021-01-01 | End: 2021-01-01

## 2021-01-01 RX ORDER — SODIUM CHLORIDE 0.9 % (FLUSH) 0.9 %
5-40 SYRINGE (ML) INJECTION PRN
Status: DISCONTINUED | OUTPATIENT
Start: 2021-01-01 | End: 2021-01-01 | Stop reason: HOSPADM

## 2021-01-01 RX ORDER — SODIUM CHLORIDE 9 MG/ML
INJECTION, SOLUTION INTRAVENOUS PRN
Status: DISCONTINUED | OUTPATIENT
Start: 2021-01-01 | End: 2021-01-01 | Stop reason: HOSPADM

## 2021-01-01 RX ORDER — VALSARTAN 80 MG/1
80 TABLET ORAL DAILY
Status: DISCONTINUED | OUTPATIENT
Start: 2021-01-01 | End: 2021-01-01 | Stop reason: HOSPADM

## 2021-01-01 RX ORDER — POTASSIUM CHLORIDE 20 MEQ/1
40 TABLET, EXTENDED RELEASE ORAL ONCE
Status: COMPLETED | OUTPATIENT
Start: 2021-01-01 | End: 2021-01-01

## 2021-01-01 RX ORDER — ISOSORBIDE MONONITRATE 30 MG/1
60 TABLET, EXTENDED RELEASE ORAL DAILY
Status: DISCONTINUED | OUTPATIENT
Start: 2021-01-01 | End: 2021-01-01 | Stop reason: HOSPADM

## 2021-01-01 RX ORDER — MIRTAZAPINE 7.5 MG/1
7.5 TABLET, FILM COATED ORAL NIGHTLY
COMMUNITY

## 2021-01-01 RX ORDER — ISOSORBIDE MONONITRATE 60 MG/1
60 TABLET, EXTENDED RELEASE ORAL DAILY
Qty: 30 TABLET | Refills: 3 | DISCHARGE
Start: 2021-01-01 | End: 2021-01-01 | Stop reason: SDUPTHER

## 2021-01-01 RX ORDER — SODIUM PHOSPHATE, DIBASIC AND SODIUM PHOSPHATE, MONOBASIC 7; 19 G/133ML; G/133ML
1 ENEMA RECTAL
COMMUNITY

## 2021-01-01 RX ORDER — ASPIRIN 81 MG/1
81 TABLET ORAL DAILY
Status: DISCONTINUED | OUTPATIENT
Start: 2021-01-01 | End: 2021-01-01 | Stop reason: HOSPADM

## 2021-01-01 RX ORDER — HYDROXYZINE PAMOATE 25 MG/1
25 CAPSULE ORAL 2 TIMES DAILY
Status: DISCONTINUED | OUTPATIENT
Start: 2021-01-01 | End: 2021-01-01 | Stop reason: HOSPADM

## 2021-01-01 RX ORDER — FUROSEMIDE 10 MG/ML
20 INJECTION INTRAMUSCULAR; INTRAVENOUS DAILY
Status: DISCONTINUED | OUTPATIENT
Start: 2021-01-01 | End: 2021-01-01

## 2021-01-01 RX ORDER — LANCETS 28 GAUGE
1 EACH MISCELLANEOUS 2 TIMES DAILY
Status: DISCONTINUED | OUTPATIENT
Start: 2021-01-01 | End: 2021-01-01 | Stop reason: CLARIF

## 2021-01-01 RX ORDER — POTASSIUM CHLORIDE 20 MEQ/1
20 TABLET, EXTENDED RELEASE ORAL
Status: DISCONTINUED | OUTPATIENT
Start: 2021-01-01 | End: 2021-01-01 | Stop reason: HOSPADM

## 2021-01-01 RX ORDER — SODIUM CHLORIDE 9 MG/ML
25 INJECTION, SOLUTION INTRAVENOUS PRN
Status: DISCONTINUED | OUTPATIENT
Start: 2021-01-01 | End: 2021-01-01 | Stop reason: HOSPADM

## 2021-01-01 RX ORDER — IPRATROPIUM BROMIDE AND ALBUTEROL SULFATE 2.5; .5 MG/3ML; MG/3ML
1 SOLUTION RESPIRATORY (INHALATION) EVERY 6 HOURS
Status: DISCONTINUED | OUTPATIENT
Start: 2021-01-01 | End: 2021-01-01 | Stop reason: HOSPADM

## 2021-01-01 RX ORDER — FUROSEMIDE 40 MG/1
40 TABLET ORAL DAILY
Status: DISCONTINUED | OUTPATIENT
Start: 2021-01-01 | End: 2021-01-01 | Stop reason: HOSPADM

## 2021-01-01 RX ORDER — QUETIAPINE FUMARATE 25 MG/1
25 TABLET, FILM COATED ORAL NIGHTLY
Status: DISCONTINUED | OUTPATIENT
Start: 2021-01-01 | End: 2021-01-01 | Stop reason: HOSPADM

## 2021-01-01 RX ORDER — CEFDINIR 300 MG/1
300 CAPSULE ORAL 2 TIMES DAILY
Qty: 20 CAPSULE | Refills: 0 | Status: ON HOLD | OUTPATIENT
Start: 2021-01-01 | End: 2021-01-01 | Stop reason: HOSPADM

## 2021-01-01 RX ORDER — IPRATROPIUM BROMIDE AND ALBUTEROL SULFATE 2.5; .5 MG/3ML; MG/3ML
3 SOLUTION RESPIRATORY (INHALATION) EVERY 6 HOURS
Qty: 360 ML | DISCHARGE
Start: 2021-01-01 | End: 2021-01-01

## 2021-01-01 RX ORDER — FERROUS SULFATE 325(65) MG
325 TABLET ORAL
Status: DISCONTINUED | OUTPATIENT
Start: 2021-01-01 | End: 2021-01-01 | Stop reason: HOSPADM

## 2021-01-01 RX ORDER — SODIUM FERRIC GLUCONATE COMPLEX IN SUCROSE 12.5 MG/ML
125 INJECTION INTRAVENOUS ONCE
Status: DISCONTINUED | OUTPATIENT
Start: 2021-01-01 | End: 2021-01-01 | Stop reason: SDUPTHER

## 2021-01-01 RX ORDER — ACETAMINOPHEN 325 MG/1
650 TABLET ORAL EVERY 4 HOURS PRN
COMMUNITY

## 2021-01-01 RX ORDER — METFORMIN HYDROCHLORIDE 500 MG/1
500 TABLET, EXTENDED RELEASE ORAL
COMMUNITY
End: 2021-01-01 | Stop reason: SDUPTHER

## 2021-01-01 RX ORDER — PANTOPRAZOLE SODIUM 40 MG/1
TABLET, DELAYED RELEASE ORAL
Qty: 90 TABLET | Refills: 0 | DISCHARGE
Start: 2021-01-01 | End: 2021-01-01

## 2021-01-01 RX ORDER — HEPARIN SODIUM 1000 [USP'U]/ML
4000 INJECTION, SOLUTION INTRAVENOUS; SUBCUTANEOUS PRN
Status: DISCONTINUED | OUTPATIENT
Start: 2021-01-01 | End: 2021-01-01

## 2021-01-01 RX ORDER — CLOPIDOGREL BISULFATE 75 MG/1
300 TABLET ORAL ONCE
Status: COMPLETED | OUTPATIENT
Start: 2021-01-01 | End: 2021-01-01

## 2021-01-01 RX ORDER — FUROSEMIDE 20 MG/1
40 TABLET ORAL DAILY
Qty: 90 TABLET | Refills: 1 | Status: SHIPPED | OUTPATIENT
Start: 2021-01-01 | End: 2021-01-01

## 2021-01-01 RX ORDER — CEFDINIR 300 MG/1
300 CAPSULE ORAL 2 TIMES DAILY
Status: DISCONTINUED | OUTPATIENT
Start: 2021-01-01 | End: 2021-01-01

## 2021-01-01 RX ORDER — HEPARIN SODIUM 10000 [USP'U]/100ML
5-30 INJECTION, SOLUTION INTRAVENOUS CONTINUOUS
Status: DISCONTINUED | OUTPATIENT
Start: 2021-01-01 | End: 2021-01-01

## 2021-01-01 RX ORDER — DOCUSATE SODIUM 100 MG/1
100 CAPSULE, LIQUID FILLED ORAL DAILY
COMMUNITY

## 2021-01-01 RX ORDER — NITROGLYCERIN 0.4 MG/1
0.4 TABLET SUBLINGUAL ONCE
Status: COMPLETED | OUTPATIENT
Start: 2021-01-01 | End: 2021-01-01

## 2021-01-01 RX ORDER — CEFDINIR 300 MG/1
300 CAPSULE ORAL ONCE
Status: COMPLETED | OUTPATIENT
Start: 2021-01-01 | End: 2021-01-01

## 2021-01-01 RX ORDER — AMLODIPINE BESYLATE 5 MG/1
5 TABLET ORAL DAILY
Status: DISCONTINUED | OUTPATIENT
Start: 2021-01-01 | End: 2021-01-01 | Stop reason: HOSPADM

## 2021-01-01 RX ORDER — DOCUSATE SODIUM 100 MG/1
100 CAPSULE, LIQUID FILLED ORAL DAILY
Status: DISCONTINUED | OUTPATIENT
Start: 2021-01-01 | End: 2021-01-01 | Stop reason: HOSPADM

## 2021-01-01 RX ORDER — BUMETANIDE 0.25 MG/ML
1 INJECTION, SOLUTION INTRAMUSCULAR; INTRAVENOUS 2 TIMES DAILY
Status: DISCONTINUED | OUTPATIENT
Start: 2021-01-01 | End: 2021-01-01 | Stop reason: HOSPADM

## 2021-01-01 RX ADMIN — IPRATROPIUM BROMIDE AND ALBUTEROL SULFATE 1 AMPULE: 2.5; .5 SOLUTION RESPIRATORY (INHALATION) at 20:12

## 2021-01-01 RX ADMIN — IPRATROPIUM BROMIDE AND ALBUTEROL SULFATE 3 ML: .5; 3 SOLUTION RESPIRATORY (INHALATION) at 08:29

## 2021-01-01 RX ADMIN — CLOPIDOGREL BISULFATE 300 MG: 75 TABLET ORAL at 21:58

## 2021-01-01 RX ADMIN — Medication 10 ML: at 09:14

## 2021-01-01 RX ADMIN — Medication 10 ML: at 13:23

## 2021-01-01 RX ADMIN — CARVEDILOL 6.25 MG: 6.25 TABLET, FILM COATED ORAL at 07:54

## 2021-01-01 RX ADMIN — METFORMIN HYDROCHLORIDE 500 MG: 500 TABLET, EXTENDED RELEASE ORAL at 07:47

## 2021-01-01 RX ADMIN — NITROGLYCERIN 0.5 INCH: 20 OINTMENT TOPICAL at 12:16

## 2021-01-01 RX ADMIN — DOCUSATE SODIUM 100 MG: 100 CAPSULE ORAL at 08:08

## 2021-01-01 RX ADMIN — LATANOPROST 1 DROP: 50 SOLUTION OPHTHALMIC at 20:23

## 2021-01-01 RX ADMIN — CARVEDILOL 6.25 MG: 6.25 TABLET, FILM COATED ORAL at 18:55

## 2021-01-01 RX ADMIN — ISOSORBIDE MONONITRATE 60 MG: 60 TABLET ORAL at 09:53

## 2021-01-01 RX ADMIN — CLOPIDOGREL 75 MG: 75 TABLET, FILM COATED ORAL at 05:38

## 2021-01-01 RX ADMIN — Medication 10 ML: at 09:31

## 2021-01-01 RX ADMIN — IPRATROPIUM BROMIDE AND ALBUTEROL SULFATE 3 ML: .5; 3 SOLUTION RESPIRATORY (INHALATION) at 20:18

## 2021-01-01 RX ADMIN — CARVEDILOL 6.25 MG: 6.25 TABLET, FILM COATED ORAL at 16:42

## 2021-01-01 RX ADMIN — INSULIN LISPRO 6 UNITS: 100 INJECTION, SOLUTION INTRAVENOUS; SUBCUTANEOUS at 09:18

## 2021-01-01 RX ADMIN — CLOPIDOGREL 75 MG: 75 TABLET, FILM COATED ORAL at 06:13

## 2021-01-01 RX ADMIN — ATORVASTATIN CALCIUM 40 MG: 40 TABLET, FILM COATED ORAL at 19:55

## 2021-01-01 RX ADMIN — CARVEDILOL 6.25 MG: 6.25 TABLET, FILM COATED ORAL at 08:21

## 2021-01-01 RX ADMIN — TAMSULOSIN HYDROCHLORIDE 0.4 MG: 0.4 CAPSULE ORAL at 18:55

## 2021-01-01 RX ADMIN — POTASSIUM CHLORIDE 20 MEQ: 20 TABLET, EXTENDED RELEASE ORAL at 08:09

## 2021-01-01 RX ADMIN — CEFDINIR 300 MG: 300 CAPSULE ORAL at 14:53

## 2021-01-01 RX ADMIN — IPRATROPIUM BROMIDE AND ALBUTEROL SULFATE 1 AMPULE: 2.5; .5 SOLUTION RESPIRATORY (INHALATION) at 03:23

## 2021-01-01 RX ADMIN — ACETAMINOPHEN 500 MG: 500 TABLET ORAL at 21:55

## 2021-01-01 RX ADMIN — CEFDINIR 300 MG: 300 CAPSULE ORAL at 21:32

## 2021-01-01 RX ADMIN — Medication 1000 MCG: at 08:42

## 2021-01-01 RX ADMIN — ASPIRIN 324 MG: 81 TABLET, CHEWABLE ORAL at 11:24

## 2021-01-01 RX ADMIN — IPRATROPIUM BROMIDE AND ALBUTEROL SULFATE 1 AMPULE: 2.5; .5 SOLUTION RESPIRATORY (INHALATION) at 07:29

## 2021-01-01 RX ADMIN — TAMSULOSIN HYDROCHLORIDE 0.4 MG: 0.4 CAPSULE ORAL at 07:47

## 2021-01-01 RX ADMIN — CARVEDILOL 6.25 MG: 6.25 TABLET, FILM COATED ORAL at 16:51

## 2021-01-01 RX ADMIN — CARVEDILOL 6.25 MG: 6.25 TABLET, FILM COATED ORAL at 09:12

## 2021-01-01 RX ADMIN — CARVEDILOL 6.25 MG: 6.25 TABLET, FILM COATED ORAL at 08:23

## 2021-01-01 RX ADMIN — INSULIN LISPRO 2 UNITS: 100 INJECTION, SOLUTION INTRAVENOUS; SUBCUTANEOUS at 16:52

## 2021-01-01 RX ADMIN — ISOSORBIDE MONONITRATE 60 MG: 30 TABLET, EXTENDED RELEASE ORAL at 09:53

## 2021-01-01 RX ADMIN — NITROGLYCERIN 0.5 INCH: 20 OINTMENT TOPICAL at 18:56

## 2021-01-01 RX ADMIN — ASPIRIN 81 MG: 81 TABLET, COATED ORAL at 08:43

## 2021-01-01 RX ADMIN — FUROSEMIDE 20 MG: 10 INJECTION, SOLUTION INTRAMUSCULAR; INTRAVENOUS at 09:12

## 2021-01-01 RX ADMIN — INSULIN LISPRO 2 UNITS: 100 INJECTION, SOLUTION INTRAVENOUS; SUBCUTANEOUS at 17:04

## 2021-01-01 RX ADMIN — Medication 1000 MCG: at 08:27

## 2021-01-01 RX ADMIN — IPRATROPIUM BROMIDE AND ALBUTEROL SULFATE 1 AMPULE: 2.5; .5 SOLUTION RESPIRATORY (INHALATION) at 19:27

## 2021-01-01 RX ADMIN — PANTOPRAZOLE SODIUM 40 MG: 40 TABLET, DELAYED RELEASE ORAL at 05:55

## 2021-01-01 RX ADMIN — TAMSULOSIN HYDROCHLORIDE 0.4 MG: 0.4 CAPSULE ORAL at 10:20

## 2021-01-01 RX ADMIN — PANTOPRAZOLE SODIUM 40 MG: 40 TABLET, DELAYED RELEASE ORAL at 05:05

## 2021-01-01 RX ADMIN — INSULIN LISPRO 2 UNITS: 100 INJECTION, SOLUTION INTRAVENOUS; SUBCUTANEOUS at 08:13

## 2021-01-01 RX ADMIN — SODIUM CHLORIDE: 9 INJECTION, SOLUTION INTRAVENOUS at 21:15

## 2021-01-01 RX ADMIN — DOCUSATE SODIUM 100 MG: 100 CAPSULE ORAL at 08:41

## 2021-01-01 RX ADMIN — CARVEDILOL 6.25 MG: 6.25 TABLET, FILM COATED ORAL at 10:19

## 2021-01-01 RX ADMIN — FUROSEMIDE 20 MG: 20 TABLET ORAL at 10:07

## 2021-01-01 RX ADMIN — HEPARIN SODIUM 2000 UNITS: 1000 INJECTION INTRAVENOUS; SUBCUTANEOUS at 12:39

## 2021-01-01 RX ADMIN — ISOSORBIDE MONONITRATE 60 MG: 30 TABLET, EXTENDED RELEASE ORAL at 09:30

## 2021-01-01 RX ADMIN — IPRATROPIUM BROMIDE AND ALBUTEROL SULFATE 1 AMPULE: 2.5; .5 SOLUTION RESPIRATORY (INHALATION) at 13:41

## 2021-01-01 RX ADMIN — Medication 10 ML: at 10:08

## 2021-01-01 RX ADMIN — INSULIN LISPRO 2 UNITS: 100 INJECTION, SOLUTION INTRAVENOUS; SUBCUTANEOUS at 11:43

## 2021-01-01 RX ADMIN — SODIUM CHLORIDE: 9 INJECTION, SOLUTION INTRAVENOUS at 01:07

## 2021-01-01 RX ADMIN — CEFDINIR 300 MG: 300 CAPSULE ORAL at 20:20

## 2021-01-01 RX ADMIN — Medication 10 ML: at 20:13

## 2021-01-01 RX ADMIN — POTASSIUM CHLORIDE 20 MEQ: 20 TABLET, EXTENDED RELEASE ORAL at 16:37

## 2021-01-01 RX ADMIN — BUMETANIDE 1 MG: 0.25 INJECTION, SOLUTION INTRAMUSCULAR; INTRAVENOUS at 08:08

## 2021-01-01 RX ADMIN — ASPIRIN 81 MG: 81 TABLET, COATED ORAL at 08:39

## 2021-01-01 RX ADMIN — PIPERACILLIN AND TAZOBACTAM 3375 MG: 3; .375 INJECTION, POWDER, LYOPHILIZED, FOR SOLUTION INTRAVENOUS at 09:30

## 2021-01-01 RX ADMIN — INSULIN LISPRO 2 UNITS: 100 INJECTION, SOLUTION INTRAVENOUS; SUBCUTANEOUS at 16:21

## 2021-01-01 RX ADMIN — ASPIRIN 81 MG: 81 TABLET, COATED ORAL at 20:34

## 2021-01-01 RX ADMIN — METOPROLOL TARTRATE 25 MG: 25 TABLET, FILM COATED ORAL at 08:39

## 2021-01-01 RX ADMIN — CARVEDILOL 6.25 MG: 6.25 TABLET, FILM COATED ORAL at 10:06

## 2021-01-01 RX ADMIN — SODIUM CHLORIDE 1000 ML: 9 INJECTION, SOLUTION INTRAVENOUS at 10:18

## 2021-01-01 RX ADMIN — FUROSEMIDE 20 MG: 20 TABLET ORAL at 08:13

## 2021-01-01 RX ADMIN — IPRATROPIUM BROMIDE AND ALBUTEROL SULFATE 1 AMPULE: 2.5; .5 SOLUTION RESPIRATORY (INHALATION) at 21:01

## 2021-01-01 RX ADMIN — METFORMIN HYDROCHLORIDE 500 MG: 500 TABLET, EXTENDED RELEASE ORAL at 11:57

## 2021-01-01 RX ADMIN — ENOXAPARIN SODIUM 40 MG: 40 INJECTION SUBCUTANEOUS at 08:28

## 2021-01-01 RX ADMIN — IPRATROPIUM BROMIDE AND ALBUTEROL SULFATE 1 AMPULE: 2.5; .5 SOLUTION RESPIRATORY (INHALATION) at 02:46

## 2021-01-01 RX ADMIN — CLOPIDOGREL 75 MG: 75 TABLET, FILM COATED ORAL at 05:28

## 2021-01-01 RX ADMIN — TAMSULOSIN HYDROCHLORIDE 0.4 MG: 0.4 CAPSULE ORAL at 10:08

## 2021-01-01 RX ADMIN — OXYCODONE HYDROCHLORIDE AND ACETAMINOPHEN 500 MG: 500 TABLET ORAL at 08:09

## 2021-01-01 RX ADMIN — FUROSEMIDE 40 MG: 10 INJECTION, SOLUTION INTRAMUSCULAR; INTRAVENOUS at 06:51

## 2021-01-01 RX ADMIN — PIPERACILLIN AND TAZOBACTAM 3375 MG: 3; .375 INJECTION, POWDER, LYOPHILIZED, FOR SOLUTION INTRAVENOUS at 10:01

## 2021-01-01 RX ADMIN — CEFDINIR 300 MG: 300 CAPSULE ORAL at 08:39

## 2021-01-01 RX ADMIN — METFORMIN HYDROCHLORIDE 500 MG: 500 TABLET, EXTENDED RELEASE ORAL at 12:14

## 2021-01-01 RX ADMIN — FERROUS SULFATE TAB 325 MG (65 MG ELEMENTAL FE) 325 MG: 325 (65 FE) TAB at 08:41

## 2021-01-01 RX ADMIN — CLOPIDOGREL 75 MG: 75 TABLET, FILM COATED ORAL at 06:28

## 2021-01-01 RX ADMIN — ATORVASTATIN CALCIUM 40 MG: 40 TABLET, FILM COATED ORAL at 07:48

## 2021-01-01 RX ADMIN — FUROSEMIDE 20 MG: 20 TABLET ORAL at 10:19

## 2021-01-01 RX ADMIN — HEPARIN SODIUM 2000 UNITS: 1000 INJECTION INTRAVENOUS; SUBCUTANEOUS at 18:54

## 2021-01-01 RX ADMIN — VALSARTAN 80 MG: 80 TABLET, FILM COATED ORAL at 08:28

## 2021-01-01 RX ADMIN — ATORVASTATIN CALCIUM 40 MG: 40 TABLET, FILM COATED ORAL at 09:13

## 2021-01-01 RX ADMIN — INSULIN LISPRO 2 UNITS: 100 INJECTION, SOLUTION INTRAVENOUS; SUBCUTANEOUS at 06:20

## 2021-01-01 RX ADMIN — INSULIN LISPRO 2 UNITS: 100 INJECTION, SOLUTION INTRAVENOUS; SUBCUTANEOUS at 17:30

## 2021-01-01 RX ADMIN — CLOPIDOGREL 75 MG: 75 TABLET, FILM COATED ORAL at 06:05

## 2021-01-01 RX ADMIN — Medication 10 ML: at 21:57

## 2021-01-01 RX ADMIN — PANTOPRAZOLE SODIUM 40 MG: 40 TABLET, DELAYED RELEASE ORAL at 06:32

## 2021-01-01 RX ADMIN — DROPERIDOL 1.25 MG: 2.5 INJECTION, SOLUTION INTRAMUSCULAR; INTRAVENOUS at 10:09

## 2021-01-01 RX ADMIN — FUROSEMIDE 40 MG: 10 INJECTION, SOLUTION INTRAVENOUS at 11:51

## 2021-01-01 RX ADMIN — TAMSULOSIN HYDROCHLORIDE 0.4 MG: 0.4 CAPSULE ORAL at 14:53

## 2021-01-01 RX ADMIN — NITROGLYCERIN 0.5 INCH: 20 OINTMENT TOPICAL at 01:07

## 2021-01-01 RX ADMIN — INSULIN LISPRO 1 UNITS: 100 INJECTION, SOLUTION INTRAVENOUS; SUBCUTANEOUS at 21:32

## 2021-01-01 RX ADMIN — BUMETANIDE 1 MG: 0.25 INJECTION, SOLUTION INTRAMUSCULAR; INTRAVENOUS at 08:41

## 2021-01-01 RX ADMIN — INSULIN LISPRO 1 UNITS: 100 INJECTION, SOLUTION INTRAVENOUS; SUBCUTANEOUS at 20:16

## 2021-01-01 RX ADMIN — ASPIRIN 81 MG: 81 TABLET, COATED ORAL at 21:00

## 2021-01-01 RX ADMIN — ISOSORBIDE MONONITRATE 60 MG: 60 TABLET ORAL at 08:08

## 2021-01-01 RX ADMIN — CARVEDILOL 6.25 MG: 6.25 TABLET, FILM COATED ORAL at 16:37

## 2021-01-01 RX ADMIN — QUETIAPINE FUMARATE 25 MG: 25 TABLET ORAL at 20:12

## 2021-01-01 RX ADMIN — BUMETANIDE 1 MG: 0.25 INJECTION, SOLUTION INTRAMUSCULAR; INTRAVENOUS at 21:01

## 2021-01-01 RX ADMIN — TAMSULOSIN HYDROCHLORIDE 0.4 MG: 0.4 CAPSULE ORAL at 08:09

## 2021-01-01 RX ADMIN — GUAIFENESIN SYRUP AND DEXTROMETHORPHAN 5 ML: 100; 10 SYRUP ORAL at 15:12

## 2021-01-01 RX ADMIN — ATORVASTATIN CALCIUM 40 MG: 40 TABLET, FILM COATED ORAL at 08:43

## 2021-01-01 RX ADMIN — PANTOPRAZOLE SODIUM 40 MG: 40 TABLET, DELAYED RELEASE ORAL at 06:21

## 2021-01-01 RX ADMIN — CARVEDILOL 6.25 MG: 6.25 TABLET, FILM COATED ORAL at 08:44

## 2021-01-01 RX ADMIN — ACETAMINOPHEN 500 MG: 500 TABLET ORAL at 07:18

## 2021-01-01 RX ADMIN — Medication 10 ML: at 20:10

## 2021-01-01 RX ADMIN — ISOSORBIDE MONONITRATE 60 MG: 30 TABLET, EXTENDED RELEASE ORAL at 08:13

## 2021-01-01 RX ADMIN — NITROGLYCERIN 0.5 INCH: 20 OINTMENT TOPICAL at 06:21

## 2021-01-01 RX ADMIN — METFORMIN HYDROCHLORIDE 500 MG: 500 TABLET, EXTENDED RELEASE ORAL at 11:15

## 2021-01-01 RX ADMIN — FUROSEMIDE 20 MG: 20 TABLET ORAL at 09:30

## 2021-01-01 RX ADMIN — CEFDINIR 300 MG: 300 CAPSULE ORAL at 00:46

## 2021-01-01 RX ADMIN — IPRATROPIUM BROMIDE AND ALBUTEROL SULFATE 3 ML: .5; 3 SOLUTION RESPIRATORY (INHALATION) at 20:06

## 2021-01-01 RX ADMIN — LATANOPROST 1 DROP: 50 SOLUTION OPHTHALMIC at 21:21

## 2021-01-01 RX ADMIN — INSULIN LISPRO 4 UNITS: 100 INJECTION, SOLUTION INTRAVENOUS; SUBCUTANEOUS at 11:52

## 2021-01-01 RX ADMIN — CARVEDILOL 6.25 MG: 6.25 TABLET, FILM COATED ORAL at 09:56

## 2021-01-01 RX ADMIN — IPRATROPIUM BROMIDE AND ALBUTEROL SULFATE 1 AMPULE: 2.5; .5 SOLUTION RESPIRATORY (INHALATION) at 19:52

## 2021-01-01 RX ADMIN — METFORMIN HYDROCHLORIDE 500 MG: 500 TABLET, EXTENDED RELEASE ORAL at 08:44

## 2021-01-01 RX ADMIN — Medication 10 ML: at 09:53

## 2021-01-01 RX ADMIN — TAMSULOSIN HYDROCHLORIDE 0.4 MG: 0.4 CAPSULE ORAL at 08:39

## 2021-01-01 RX ADMIN — SODIUM CHLORIDE 125 MG: 9 INJECTION, SOLUTION INTRAVENOUS at 10:16

## 2021-01-01 RX ADMIN — METOPROLOL TARTRATE 50 MG: 50 TABLET, FILM COATED ORAL at 07:46

## 2021-01-01 RX ADMIN — QUETIAPINE FUMARATE 25 MG: 25 TABLET ORAL at 22:23

## 2021-01-01 RX ADMIN — FUROSEMIDE 20 MG: 20 TABLET ORAL at 13:48

## 2021-01-01 RX ADMIN — CARVEDILOL 6.25 MG: 6.25 TABLET, FILM COATED ORAL at 08:42

## 2021-01-01 RX ADMIN — INSULIN LISPRO 4 UNITS: 100 INJECTION, SOLUTION INTRAVENOUS; SUBCUTANEOUS at 16:49

## 2021-01-01 RX ADMIN — CLOPIDOGREL BISULFATE 75 MG: 75 TABLET ORAL at 08:44

## 2021-01-01 RX ADMIN — ATORVASTATIN CALCIUM 40 MG: 40 TABLET, FILM COATED ORAL at 10:01

## 2021-01-01 RX ADMIN — Medication 10 ML: at 08:21

## 2021-01-01 RX ADMIN — METOPROLOL TARTRATE 25 MG: 25 TABLET, FILM COATED ORAL at 20:20

## 2021-01-01 RX ADMIN — POTASSIUM CHLORIDE 40 MEQ: 1500 TABLET, EXTENDED RELEASE ORAL at 08:08

## 2021-01-01 RX ADMIN — PANTOPRAZOLE SODIUM 40 MG: 40 TABLET, DELAYED RELEASE ORAL at 05:57

## 2021-01-01 RX ADMIN — PANTOPRAZOLE SODIUM 40 MG: 40 TABLET, DELAYED RELEASE ORAL at 06:48

## 2021-01-01 RX ADMIN — Medication 10 ML: at 20:39

## 2021-01-01 RX ADMIN — CARVEDILOL 6.25 MG: 6.25 TABLET, FILM COATED ORAL at 08:09

## 2021-01-01 RX ADMIN — ATORVASTATIN CALCIUM 40 MG: 40 TABLET, FILM COATED ORAL at 10:06

## 2021-01-01 RX ADMIN — ASPIRIN 81 MG: 81 TABLET, COATED ORAL at 07:01

## 2021-01-01 RX ADMIN — FUROSEMIDE 20 MG: 20 TABLET ORAL at 10:01

## 2021-01-01 RX ADMIN — Medication 10 ML: at 20:02

## 2021-01-01 RX ADMIN — LATANOPROST 1 DROP: 50 SOLUTION OPHTHALMIC at 21:01

## 2021-01-01 RX ADMIN — LATANOPROST 1 DROP: 50 SOLUTION OPHTHALMIC at 20:04

## 2021-01-01 RX ADMIN — ISOSORBIDE MONONITRATE 60 MG: 60 TABLET ORAL at 08:42

## 2021-01-01 RX ADMIN — INSULIN LISPRO 2 UNITS: 100 INJECTION, SOLUTION INTRAVENOUS; SUBCUTANEOUS at 08:09

## 2021-01-01 RX ADMIN — HYDROXYZINE PAMOATE 50 MG: 50 CAPSULE ORAL at 21:32

## 2021-01-01 RX ADMIN — METFORMIN HYDROCHLORIDE 500 MG: 500 TABLET, EXTENDED RELEASE ORAL at 11:46

## 2021-01-01 RX ADMIN — CEFTRIAXONE 1000 MG: 1 INJECTION, POWDER, FOR SOLUTION INTRAMUSCULAR; INTRAVENOUS at 23:42

## 2021-01-01 RX ADMIN — INSULIN LISPRO 2 UNITS: 100 INJECTION, SOLUTION INTRAVENOUS; SUBCUTANEOUS at 20:11

## 2021-01-01 RX ADMIN — ISOSORBIDE MONONITRATE 60 MG: 60 TABLET ORAL at 08:44

## 2021-01-01 RX ADMIN — IPRATROPIUM BROMIDE AND ALBUTEROL SULFATE 1 AMPULE: 2.5; .5 SOLUTION RESPIRATORY (INHALATION) at 14:17

## 2021-01-01 RX ADMIN — CLOPIDOGREL 75 MG: 75 TABLET, FILM COATED ORAL at 05:57

## 2021-01-01 RX ADMIN — PANTOPRAZOLE SODIUM 40 MG: 40 TABLET, DELAYED RELEASE ORAL at 06:05

## 2021-01-01 RX ADMIN — ATORVASTATIN CALCIUM 40 MG: 40 TABLET, FILM COATED ORAL at 14:53

## 2021-01-01 RX ADMIN — IPRATROPIUM BROMIDE AND ALBUTEROL SULFATE 1 AMPULE: 2.5; .5 SOLUTION RESPIRATORY (INHALATION) at 06:20

## 2021-01-01 RX ADMIN — IPRATROPIUM BROMIDE AND ALBUTEROL SULFATE 1 AMPULE: 2.5; .5 SOLUTION RESPIRATORY (INHALATION) at 04:54

## 2021-01-01 RX ADMIN — INSULIN LISPRO 2 UNITS: 100 INJECTION, SOLUTION INTRAVENOUS; SUBCUTANEOUS at 17:20

## 2021-01-01 RX ADMIN — CARVEDILOL 6.25 MG: 6.25 TABLET, FILM COATED ORAL at 16:34

## 2021-01-01 RX ADMIN — PANTOPRAZOLE SODIUM 40 MG: 40 TABLET, DELAYED RELEASE ORAL at 05:28

## 2021-01-01 RX ADMIN — SODIUM CHLORIDE 1000 ML: 9 INJECTION, SOLUTION INTRAVENOUS at 13:13

## 2021-01-01 RX ADMIN — INSULIN LISPRO 4 UNITS: 100 INJECTION, SOLUTION INTRAVENOUS; SUBCUTANEOUS at 11:15

## 2021-01-01 RX ADMIN — QUETIAPINE FUMARATE 25 MG: 25 TABLET ORAL at 20:39

## 2021-01-01 RX ADMIN — QUETIAPINE FUMARATE 25 MG: 25 TABLET ORAL at 20:23

## 2021-01-01 RX ADMIN — FERROUS SULFATE TAB 325 MG (65 MG ELEMENTAL FE) 325 MG: 325 (65 FE) TAB at 08:44

## 2021-01-01 RX ADMIN — INSULIN LISPRO 1 UNITS: 100 INJECTION, SOLUTION INTRAVENOUS; SUBCUTANEOUS at 20:20

## 2021-01-01 RX ADMIN — IPRATROPIUM BROMIDE AND ALBUTEROL SULFATE 1 AMPULE: 2.5; .5 SOLUTION RESPIRATORY (INHALATION) at 09:17

## 2021-01-01 RX ADMIN — CLOPIDOGREL 75 MG: 75 TABLET, FILM COATED ORAL at 07:00

## 2021-01-01 RX ADMIN — SODIUM CHLORIDE: 9 INJECTION, SOLUTION INTRAVENOUS at 03:11

## 2021-01-01 RX ADMIN — DOCUSATE SODIUM 100 MG: 100 CAPSULE ORAL at 13:03

## 2021-01-01 RX ADMIN — LATANOPROST 1 DROP: 50 SOLUTION OPHTHALMIC at 20:20

## 2021-01-01 RX ADMIN — INSULIN LISPRO 2 UNITS: 100 INJECTION, SOLUTION INTRAVENOUS; SUBCUTANEOUS at 21:25

## 2021-01-01 RX ADMIN — IPRATROPIUM BROMIDE AND ALBUTEROL SULFATE 1 AMPULE: 2.5; .5 SOLUTION RESPIRATORY (INHALATION) at 13:06

## 2021-01-01 RX ADMIN — DOCUSATE SODIUM 100 MG: 100 CAPSULE ORAL at 20:34

## 2021-01-01 RX ADMIN — NITROGLYCERIN 0.5 INCH: 20 OINTMENT TOPICAL at 17:30

## 2021-01-01 RX ADMIN — INSULIN LISPRO 2 UNITS: 100 INJECTION, SOLUTION INTRAVENOUS; SUBCUTANEOUS at 07:24

## 2021-01-01 RX ADMIN — CARVEDILOL 6.25 MG: 6.25 TABLET, FILM COATED ORAL at 16:59

## 2021-01-01 RX ADMIN — CEFDINIR 300 MG: 300 CAPSULE ORAL at 21:55

## 2021-01-01 RX ADMIN — INSULIN LISPRO 4 UNITS: 100 INJECTION, SOLUTION INTRAVENOUS; SUBCUTANEOUS at 12:33

## 2021-01-01 RX ADMIN — POTASSIUM CHLORIDE 40 MEQ: 1500 TABLET, EXTENDED RELEASE ORAL at 13:52

## 2021-01-01 RX ADMIN — IPRATROPIUM BROMIDE AND ALBUTEROL SULFATE 1 AMPULE: 2.5; .5 SOLUTION RESPIRATORY (INHALATION) at 07:58

## 2021-01-01 RX ADMIN — SODIUM CHLORIDE: 9 INJECTION, SOLUTION INTRAVENOUS at 13:23

## 2021-01-01 RX ADMIN — SODIUM CHLORIDE, PRESERVATIVE FREE 10 ML: 5 INJECTION INTRAVENOUS at 10:12

## 2021-01-01 RX ADMIN — ASPIRIN 81 MG: 81 TABLET, COATED ORAL at 09:56

## 2021-01-01 RX ADMIN — SODIUM CHLORIDE 250 ML: 9 INJECTION, SOLUTION INTRAVENOUS at 11:24

## 2021-01-01 RX ADMIN — PANTOPRAZOLE SODIUM 40 MG: 40 TABLET, DELAYED RELEASE ORAL at 06:14

## 2021-01-01 RX ADMIN — Medication 10 ML: at 10:19

## 2021-01-01 RX ADMIN — VALSARTAN 80 MG: 80 TABLET, FILM COATED ORAL at 07:47

## 2021-01-01 RX ADMIN — ERGOCALCIFEROL 50000 UNITS: 1.25 CAPSULE ORAL at 09:13

## 2021-01-01 RX ADMIN — ISOSORBIDE MONONITRATE 60 MG: 30 TABLET, EXTENDED RELEASE ORAL at 10:01

## 2021-01-01 RX ADMIN — NITROGLYCERIN 0.5 INCH: 20 OINTMENT TOPICAL at 11:54

## 2021-01-01 RX ADMIN — LORAZEPAM 0.5 MG: 2 INJECTION INTRAMUSCULAR; INTRAVENOUS at 11:22

## 2021-01-01 RX ADMIN — TAMSULOSIN HYDROCHLORIDE 0.4 MG: 0.4 CAPSULE ORAL at 10:01

## 2021-01-01 RX ADMIN — ASPIRIN 81 MG: 81 TABLET, COATED ORAL at 09:30

## 2021-01-01 RX ADMIN — INSULIN LISPRO 2 UNITS: 100 INJECTION, SOLUTION INTRAVENOUS; SUBCUTANEOUS at 16:35

## 2021-01-01 RX ADMIN — METFORMIN HYDROCHLORIDE 500 MG: 500 TABLET, EXTENDED RELEASE ORAL at 08:27

## 2021-01-01 RX ADMIN — TAMSULOSIN HYDROCHLORIDE 0.4 MG: 0.4 CAPSULE ORAL at 09:53

## 2021-01-01 RX ADMIN — PANTOPRAZOLE SODIUM 40 MG: 40 TABLET, DELAYED RELEASE ORAL at 06:12

## 2021-01-01 RX ADMIN — ASPIRIN 81 MG: 81 TABLET, COATED ORAL at 10:06

## 2021-01-01 RX ADMIN — IPRATROPIUM BROMIDE AND ALBUTEROL SULFATE 3 ML: .5; 3 SOLUTION RESPIRATORY (INHALATION) at 08:12

## 2021-01-01 RX ADMIN — NITROGLYCERIN 0.5 INCH: 20 OINTMENT TOPICAL at 11:50

## 2021-01-01 RX ADMIN — TAMSULOSIN HYDROCHLORIDE 0.4 MG: 0.4 CAPSULE ORAL at 09:30

## 2021-01-01 RX ADMIN — INSULIN LISPRO 2 UNITS: 100 INJECTION, SOLUTION INTRAVENOUS; SUBCUTANEOUS at 12:17

## 2021-01-01 RX ADMIN — Medication 10 ML: at 08:14

## 2021-01-01 RX ADMIN — CEFDINIR 300 MG: 300 CAPSULE ORAL at 09:13

## 2021-01-01 RX ADMIN — OXYBUTYNIN CHLORIDE 15 MG: 10 TABLET, EXTENDED RELEASE ORAL at 07:46

## 2021-01-01 RX ADMIN — CARVEDILOL 6.25 MG: 6.25 TABLET, FILM COATED ORAL at 09:53

## 2021-01-01 RX ADMIN — NITROGLYCERIN 0.5 INCH: 20 OINTMENT TOPICAL at 00:09

## 2021-01-01 RX ADMIN — CARVEDILOL 6.25 MG: 6.25 TABLET, FILM COATED ORAL at 17:04

## 2021-01-01 RX ADMIN — NITROGLYCERIN 0.5 INCH: 20 OINTMENT TOPICAL at 22:51

## 2021-01-01 RX ADMIN — AMLODIPINE BESYLATE 5 MG: 5 TABLET ORAL at 08:28

## 2021-01-01 RX ADMIN — CLOPIDOGREL 75 MG: 75 TABLET, FILM COATED ORAL at 08:09

## 2021-01-01 RX ADMIN — TAMSULOSIN HYDROCHLORIDE 0.4 MG: 0.4 CAPSULE ORAL at 10:53

## 2021-01-01 RX ADMIN — IPRATROPIUM BROMIDE AND ALBUTEROL SULFATE 1 AMPULE: 2.5; .5 SOLUTION RESPIRATORY (INHALATION) at 09:14

## 2021-01-01 RX ADMIN — PANTOPRAZOLE SODIUM 40 MG: 40 TABLET, DELAYED RELEASE ORAL at 06:28

## 2021-01-01 RX ADMIN — IPRATROPIUM BROMIDE AND ALBUTEROL SULFATE 3 ML: .5; 3 SOLUTION RESPIRATORY (INHALATION) at 15:49

## 2021-01-01 RX ADMIN — ASPIRIN 81 MG: 81 TABLET, COATED ORAL at 20:20

## 2021-01-01 RX ADMIN — Medication 10 ML: at 10:52

## 2021-01-01 RX ADMIN — CEFDINIR 300 MG: 300 CAPSULE ORAL at 21:21

## 2021-01-01 RX ADMIN — IPRATROPIUM BROMIDE AND ALBUTEROL SULFATE 1 AMPULE: 2.5; .5 SOLUTION RESPIRATORY (INHALATION) at 07:51

## 2021-01-01 RX ADMIN — INSULIN LISPRO 4 UNITS: 100 INJECTION, SOLUTION INTRAVENOUS; SUBCUTANEOUS at 16:41

## 2021-01-01 RX ADMIN — LATANOPROST 1 DROP: 50 SOLUTION OPHTHALMIC at 20:34

## 2021-01-01 RX ADMIN — IPRATROPIUM BROMIDE AND ALBUTEROL SULFATE 1 AMPULE: 2.5; .5 SOLUTION RESPIRATORY (INHALATION) at 02:51

## 2021-01-01 RX ADMIN — INSULIN LISPRO 1 UNITS: 100 INJECTION, SOLUTION INTRAVENOUS; SUBCUTANEOUS at 20:35

## 2021-01-01 RX ADMIN — CEFDINIR 300 MG: 300 CAPSULE ORAL at 20:10

## 2021-01-01 RX ADMIN — POTASSIUM CHLORIDE 20 MEQ: 1500 TABLET, EXTENDED RELEASE ORAL at 08:44

## 2021-01-01 RX ADMIN — Medication 10 ML: at 22:23

## 2021-01-01 RX ADMIN — FUROSEMIDE 40 MG: 10 INJECTION, SOLUTION INTRAVENOUS at 13:25

## 2021-01-01 RX ADMIN — METFORMIN HYDROCHLORIDE 500 MG: 500 TABLET, EXTENDED RELEASE ORAL at 08:43

## 2021-01-01 RX ADMIN — PANTOPRAZOLE SODIUM 40 MG: 40 TABLET, DELAYED RELEASE ORAL at 06:13

## 2021-01-01 RX ADMIN — INSULIN LISPRO 2 UNITS: 100 INJECTION, SOLUTION INTRAVENOUS; SUBCUTANEOUS at 17:15

## 2021-01-01 RX ADMIN — NITROGLYCERIN 0.5 INCH: 20 OINTMENT TOPICAL at 10:53

## 2021-01-01 RX ADMIN — ATORVASTATIN CALCIUM 40 MG: 40 TABLET, FILM COATED ORAL at 10:09

## 2021-01-01 RX ADMIN — INSULIN LISPRO 4 UNITS: 100 INJECTION, SOLUTION INTRAVENOUS; SUBCUTANEOUS at 12:13

## 2021-01-01 RX ADMIN — ASPIRIN 81 MG: 81 TABLET, COATED ORAL at 09:13

## 2021-01-01 RX ADMIN — IPRATROPIUM BROMIDE AND ALBUTEROL SULFATE 1 AMPULE: 2.5; .5 SOLUTION RESPIRATORY (INHALATION) at 16:38

## 2021-01-01 RX ADMIN — HEPARIN SODIUM 2000 UNITS: 1000 INJECTION INTRAVENOUS; SUBCUTANEOUS at 02:14

## 2021-01-01 RX ADMIN — QUETIAPINE FUMARATE 25 MG: 25 TABLET ORAL at 20:16

## 2021-01-01 RX ADMIN — ATORVASTATIN CALCIUM 40 MG: 40 TABLET, FILM COATED ORAL at 09:56

## 2021-01-01 RX ADMIN — IPRATROPIUM BROMIDE AND ALBUTEROL SULFATE 1 AMPULE: 2.5; .5 SOLUTION RESPIRATORY (INHALATION) at 02:31

## 2021-01-01 RX ADMIN — CEFDINIR 300 MG: 300 CAPSULE ORAL at 10:53

## 2021-01-01 RX ADMIN — NITROGLYCERIN 0.5 INCH: 20 OINTMENT TOPICAL at 17:00

## 2021-01-01 RX ADMIN — AMLODIPINE BESYLATE 5 MG: 5 TABLET ORAL at 08:39

## 2021-01-01 RX ADMIN — DOCUSATE SODIUM 100 MG: 100 CAPSULE, LIQUID FILLED ORAL at 09:53

## 2021-01-01 RX ADMIN — NITROGLYCERIN 0.5 INCH: 20 OINTMENT TOPICAL at 05:45

## 2021-01-01 RX ADMIN — HALOPERIDOL LACTATE 2 MG: 5 INJECTION INTRAMUSCULAR at 21:54

## 2021-01-01 RX ADMIN — LATANOPROST 1 DROP: 50 SOLUTION OPHTHALMIC at 20:15

## 2021-01-01 RX ADMIN — PIPERACILLIN AND TAZOBACTAM 3375 MG: 3; .375 INJECTION, POWDER, LYOPHILIZED, FOR SOLUTION INTRAVENOUS at 01:58

## 2021-01-01 RX ADMIN — PIPERACILLIN AND TAZOBACTAM 3375 MG: 3; .375 INJECTION, POWDER, LYOPHILIZED, FOR SOLUTION INTRAVENOUS at 16:57

## 2021-01-01 RX ADMIN — METOPROLOL TARTRATE 50 MG: 50 TABLET, FILM COATED ORAL at 08:28

## 2021-01-01 RX ADMIN — SODIUM CHLORIDE, PRESERVATIVE FREE 10 ML: 5 INJECTION INTRAVENOUS at 16:57

## 2021-01-01 RX ADMIN — ACETAMINOPHEN 500 MG: 500 TABLET ORAL at 20:19

## 2021-01-01 RX ADMIN — Medication 1000 MCG: at 07:46

## 2021-01-01 RX ADMIN — IPRATROPIUM BROMIDE AND ALBUTEROL SULFATE 1 AMPULE: 2.5; .5 SOLUTION RESPIRATORY (INHALATION) at 21:34

## 2021-01-01 RX ADMIN — ASPIRIN 81 MG: 81 TABLET, COATED ORAL at 09:53

## 2021-01-01 RX ADMIN — IPRATROPIUM BROMIDE AND ALBUTEROL SULFATE 1 AMPULE: 2.5; .5 SOLUTION RESPIRATORY (INHALATION) at 10:19

## 2021-01-01 RX ADMIN — NITROGLYCERIN 0.4 MG: 0.4 TABLET, ORALLY DISINTEGRATING SUBLINGUAL at 07:28

## 2021-01-01 RX ADMIN — CLOPIDOGREL 75 MG: 75 TABLET, FILM COATED ORAL at 06:12

## 2021-01-01 RX ADMIN — INSULIN LISPRO 4 UNITS: 100 INJECTION, SOLUTION INTRAVENOUS; SUBCUTANEOUS at 12:15

## 2021-01-01 RX ADMIN — IPRATROPIUM BROMIDE AND ALBUTEROL SULFATE 1 AMPULE: 2.5; .5 SOLUTION RESPIRATORY (INHALATION) at 12:19

## 2021-01-01 RX ADMIN — POTASSIUM CHLORIDE 20 MEQ: 20 TABLET, EXTENDED RELEASE ORAL at 11:59

## 2021-01-01 RX ADMIN — INSULIN LISPRO 4 UNITS: 100 INJECTION, SOLUTION INTRAVENOUS; SUBCUTANEOUS at 16:58

## 2021-01-01 RX ADMIN — Medication 10 ML: at 10:01

## 2021-01-01 RX ADMIN — DOCUSATE SODIUM 100 MG: 100 CAPSULE, LIQUID FILLED ORAL at 08:44

## 2021-01-01 RX ADMIN — IPRATROPIUM BROMIDE AND ALBUTEROL SULFATE 1 AMPULE: 2.5; .5 SOLUTION RESPIRATORY (INHALATION) at 01:51

## 2021-01-01 RX ADMIN — DOCUSATE SODIUM 100 MG: 100 CAPSULE ORAL at 21:00

## 2021-01-01 RX ADMIN — AMLODIPINE BESYLATE 5 MG: 5 TABLET ORAL at 11:51

## 2021-01-01 RX ADMIN — ATORVASTATIN CALCIUM 40 MG: 40 TABLET, FILM COATED ORAL at 10:53

## 2021-01-01 RX ADMIN — ATORVASTATIN CALCIUM 40 MG: 40 TABLET, FILM COATED ORAL at 09:53

## 2021-01-01 RX ADMIN — ATORVASTATIN CALCIUM 40 MG: 40 TABLET, FILM COATED ORAL at 08:21

## 2021-01-01 RX ADMIN — OXYBUTYNIN CHLORIDE 15 MG: 10 TABLET, EXTENDED RELEASE ORAL at 11:43

## 2021-01-01 RX ADMIN — METFORMIN HYDROCHLORIDE 500 MG: 500 TABLET, EXTENDED RELEASE ORAL at 16:45

## 2021-01-01 RX ADMIN — IPRATROPIUM BROMIDE AND ALBUTEROL SULFATE 1 AMPULE: 2.5; .5 SOLUTION RESPIRATORY (INHALATION) at 21:31

## 2021-01-01 RX ADMIN — INSULIN LISPRO 1 UNITS: 100 INJECTION, SOLUTION INTRAVENOUS; SUBCUTANEOUS at 20:42

## 2021-01-01 RX ADMIN — INSULIN LISPRO 2 UNITS: 100 INJECTION, SOLUTION INTRAVENOUS; SUBCUTANEOUS at 07:48

## 2021-01-01 RX ADMIN — TAMSULOSIN HYDROCHLORIDE 0.4 MG: 0.4 CAPSULE ORAL at 09:56

## 2021-01-01 RX ADMIN — IPRATROPIUM BROMIDE AND ALBUTEROL SULFATE 1 AMPULE: 2.5; .5 SOLUTION RESPIRATORY (INHALATION) at 13:16

## 2021-01-01 RX ADMIN — ATORVASTATIN CALCIUM 40 MG: 40 TABLET, FILM COATED ORAL at 18:55

## 2021-01-01 RX ADMIN — METFORMIN HYDROCHLORIDE 500 MG: 500 TABLET, EXTENDED RELEASE ORAL at 16:50

## 2021-01-01 RX ADMIN — NITROGLYCERIN 0.5 INCH: 20 OINTMENT TOPICAL at 06:12

## 2021-01-01 RX ADMIN — HEPARIN SODIUM 4000 UNITS: 1000 INJECTION INTRAVENOUS; SUBCUTANEOUS at 07:32

## 2021-01-01 RX ADMIN — PANTOPRAZOLE SODIUM 40 MG: 40 TABLET, DELAYED RELEASE ORAL at 08:27

## 2021-01-01 RX ADMIN — CEFDINIR 300 MG: 300 CAPSULE ORAL at 10:09

## 2021-01-01 RX ADMIN — INSULIN LISPRO 6 UNITS: 100 INJECTION, SOLUTION INTRAVENOUS; SUBCUTANEOUS at 12:33

## 2021-01-01 RX ADMIN — PANTOPRAZOLE SODIUM 40 MG: 40 TABLET, DELAYED RELEASE ORAL at 07:01

## 2021-01-01 RX ADMIN — HYDROXYZINE PAMOATE 50 MG: 50 CAPSULE ORAL at 21:59

## 2021-01-01 RX ADMIN — ASPIRIN 81 MG: 81 TABLET, COATED ORAL at 08:13

## 2021-01-01 RX ADMIN — SODIUM CHLORIDE, PRESERVATIVE FREE 10 ML: 5 INJECTION INTRAVENOUS at 08:41

## 2021-01-01 RX ADMIN — ENOXAPARIN SODIUM 40 MG: 40 INJECTION SUBCUTANEOUS at 07:47

## 2021-01-01 RX ADMIN — TAMSULOSIN HYDROCHLORIDE 0.4 MG: 0.4 CAPSULE ORAL at 08:13

## 2021-01-01 RX ADMIN — HYDROXYZINE PAMOATE 25 MG: 25 CAPSULE ORAL at 11:46

## 2021-01-01 RX ADMIN — CARVEDILOL 6.25 MG: 6.25 TABLET, FILM COATED ORAL at 16:57

## 2021-01-01 RX ADMIN — ATORVASTATIN CALCIUM 40 MG: 40 TABLET, FILM COATED ORAL at 10:20

## 2021-01-01 RX ADMIN — Medication 10 ML: at 20:34

## 2021-01-01 RX ADMIN — CEFDINIR 300 MG: 300 CAPSULE ORAL at 09:56

## 2021-01-01 RX ADMIN — ATORVASTATIN CALCIUM 40 MG: 40 TABLET, FILM COATED ORAL at 08:27

## 2021-01-01 RX ADMIN — INSULIN LISPRO 2 UNITS: 100 INJECTION, SOLUTION INTRAVENOUS; SUBCUTANEOUS at 11:46

## 2021-01-01 RX ADMIN — PANTOPRAZOLE SODIUM 40 MG: 40 TABLET, DELAYED RELEASE ORAL at 05:38

## 2021-01-01 RX ADMIN — ATORVASTATIN CALCIUM 40 MG: 40 TABLET, FILM COATED ORAL at 08:08

## 2021-01-01 RX ADMIN — METFORMIN HYDROCHLORIDE 500 MG: 500 TABLET, EXTENDED RELEASE ORAL at 18:55

## 2021-01-01 RX ADMIN — IPRATROPIUM BROMIDE AND ALBUTEROL SULFATE 1 AMPULE: 2.5; .5 SOLUTION RESPIRATORY (INHALATION) at 13:12

## 2021-01-01 RX ADMIN — FUROSEMIDE 20 MG: 20 TABLET ORAL at 08:21

## 2021-01-01 RX ADMIN — IPRATROPIUM BROMIDE AND ALBUTEROL SULFATE 1 AMPULE: 2.5; .5 SOLUTION RESPIRATORY (INHALATION) at 09:28

## 2021-01-01 RX ADMIN — ASPIRIN 81 MG: 81 TABLET, COATED ORAL at 19:55

## 2021-01-01 RX ADMIN — POTASSIUM CHLORIDE 20 MEQ: 20 TABLET, EXTENDED RELEASE ORAL at 08:45

## 2021-01-01 RX ADMIN — Medication 10 ML: at 10:07

## 2021-01-01 RX ADMIN — IPRATROPIUM BROMIDE AND ALBUTEROL SULFATE 1 AMPULE: 2.5; .5 SOLUTION RESPIRATORY (INHALATION) at 12:05

## 2021-01-01 RX ADMIN — PANTOPRAZOLE SODIUM 40 MG: 40 TABLET, DELAYED RELEASE ORAL at 06:38

## 2021-01-01 RX ADMIN — TAMSULOSIN HYDROCHLORIDE 0.4 MG: 0.4 CAPSULE ORAL at 08:21

## 2021-01-01 RX ADMIN — LATANOPROST 1 DROP: 50 SOLUTION OPHTHALMIC at 20:12

## 2021-01-01 RX ADMIN — LATANOPROST 1 DROP: 50 SOLUTION OPHTHALMIC at 21:31

## 2021-01-01 RX ADMIN — INSULIN LISPRO 1 UNITS: 100 INJECTION, SOLUTION INTRAVENOUS; SUBCUTANEOUS at 21:00

## 2021-01-01 RX ADMIN — ISOSORBIDE MONONITRATE 60 MG: 30 TABLET, EXTENDED RELEASE ORAL at 08:20

## 2021-01-01 RX ADMIN — METFORMIN HYDROCHLORIDE 500 MG: 500 TABLET, EXTENDED RELEASE ORAL at 16:37

## 2021-01-01 RX ADMIN — IPRATROPIUM BROMIDE AND ALBUTEROL SULFATE 1 AMPULE: 2.5; .5 SOLUTION RESPIRATORY (INHALATION) at 19:03

## 2021-01-01 RX ADMIN — ERGOCALCIFEROL 50000 UNITS: 1.25 CAPSULE ORAL at 10:19

## 2021-01-01 RX ADMIN — IPRATROPIUM BROMIDE AND ALBUTEROL SULFATE 3 ML: .5; 3 SOLUTION RESPIRATORY (INHALATION) at 14:22

## 2021-01-01 RX ADMIN — IPRATROPIUM BROMIDE AND ALBUTEROL SULFATE 1 AMPULE: 2.5; .5 SOLUTION RESPIRATORY (INHALATION) at 20:22

## 2021-01-01 RX ADMIN — PANTOPRAZOLE SODIUM 40 MG: 40 TABLET, DELAYED RELEASE ORAL at 06:52

## 2021-01-01 RX ADMIN — CLOPIDOGREL 75 MG: 75 TABLET, FILM COATED ORAL at 06:21

## 2021-01-01 RX ADMIN — IPRATROPIUM BROMIDE AND ALBUTEROL SULFATE 1 AMPULE: 2.5; .5 SOLUTION RESPIRATORY (INHALATION) at 14:42

## 2021-01-01 RX ADMIN — ASPIRIN 81 MG: 81 TABLET, COATED ORAL at 08:20

## 2021-01-01 RX ADMIN — CARVEDILOL 6.25 MG: 6.25 TABLET, FILM COATED ORAL at 09:30

## 2021-01-01 RX ADMIN — CEFDINIR 300 MG: 300 CAPSULE ORAL at 20:04

## 2021-01-01 RX ADMIN — FUROSEMIDE 40 MG: 10 INJECTION INTRAMUSCULAR; INTRAVENOUS at 11:37

## 2021-01-01 RX ADMIN — INSULIN LISPRO 1 UNITS: 100 INJECTION, SOLUTION INTRAVENOUS; SUBCUTANEOUS at 21:15

## 2021-01-01 RX ADMIN — NITROGLYCERIN 0.5 INCH: 20 OINTMENT TOPICAL at 16:53

## 2021-01-01 RX ADMIN — NITROGLYCERIN 0.5 INCH: 20 OINTMENT TOPICAL at 06:32

## 2021-01-01 RX ADMIN — INSULIN LISPRO 4 UNITS: 100 INJECTION, SOLUTION INTRAVENOUS; SUBCUTANEOUS at 11:47

## 2021-01-01 RX ADMIN — INSULIN LISPRO 2 UNITS: 100 INJECTION, SOLUTION INTRAVENOUS; SUBCUTANEOUS at 06:59

## 2021-01-01 RX ADMIN — NITROGLYCERIN 0.5 INCH: 20 OINTMENT TOPICAL at 23:54

## 2021-01-01 RX ADMIN — CLOPIDOGREL 75 MG: 75 TABLET, FILM COATED ORAL at 08:43

## 2021-01-01 RX ADMIN — INSULIN LISPRO 2 UNITS: 100 INJECTION, SOLUTION INTRAVENOUS; SUBCUTANEOUS at 16:43

## 2021-01-01 RX ADMIN — HYDROXYZINE PAMOATE 50 MG: 50 CAPSULE ORAL at 21:21

## 2021-01-01 RX ADMIN — INSULIN LISPRO 4 UNITS: 100 INJECTION, SOLUTION INTRAVENOUS; SUBCUTANEOUS at 11:46

## 2021-01-01 RX ADMIN — LATANOPROST 1 DROP: 50 SOLUTION OPHTHALMIC at 20:39

## 2021-01-01 RX ADMIN — FUROSEMIDE 40 MG: 40 TABLET ORAL at 08:44

## 2021-01-01 RX ADMIN — INSULIN LISPRO 4 UNITS: 100 INJECTION, SOLUTION INTRAVENOUS; SUBCUTANEOUS at 06:31

## 2021-01-01 RX ADMIN — ASPIRIN 81 MG: 81 TABLET, COATED ORAL at 10:09

## 2021-01-01 RX ADMIN — TAMSULOSIN HYDROCHLORIDE 0.4 MG: 0.4 CAPSULE ORAL at 10:06

## 2021-01-01 RX ADMIN — ASPIRIN 81 MG: 81 TABLET, COATED ORAL at 10:01

## 2021-01-01 RX ADMIN — BUMETANIDE 1 MG: 0.25 INJECTION, SOLUTION INTRAMUSCULAR; INTRAVENOUS at 13:02

## 2021-01-01 RX ADMIN — QUETIAPINE FUMARATE 25 MG: 25 TABLET ORAL at 20:34

## 2021-01-01 RX ADMIN — BUMETANIDE 1 MG: 0.25 INJECTION, SOLUTION INTRAMUSCULAR; INTRAVENOUS at 20:34

## 2021-01-01 RX ADMIN — ISOSORBIDE MONONITRATE 60 MG: 30 TABLET, EXTENDED RELEASE ORAL at 10:07

## 2021-01-01 RX ADMIN — TAMSULOSIN HYDROCHLORIDE 0.4 MG: 0.4 CAPSULE ORAL at 08:44

## 2021-01-01 RX ADMIN — ISOSORBIDE MONONITRATE 60 MG: 30 TABLET, EXTENDED RELEASE ORAL at 10:19

## 2021-01-01 RX ADMIN — IPRATROPIUM BROMIDE AND ALBUTEROL SULFATE 1 AMPULE: 2.5; .5 SOLUTION RESPIRATORY (INHALATION) at 20:04

## 2021-01-01 RX ADMIN — FUROSEMIDE 20 MG: 20 TABLET ORAL at 09:53

## 2021-01-01 RX ADMIN — Medication 10 ML: at 21:21

## 2021-01-01 RX ADMIN — INSULIN LISPRO 1 UNITS: 100 INJECTION, SOLUTION INTRAVENOUS; SUBCUTANEOUS at 20:13

## 2021-01-01 RX ADMIN — INSULIN LISPRO 6 UNITS: 100 INJECTION, SOLUTION INTRAVENOUS; SUBCUTANEOUS at 12:00

## 2021-01-01 RX ADMIN — IPRATROPIUM BROMIDE AND ALBUTEROL SULFATE 1 AMPULE: 2.5; .5 SOLUTION RESPIRATORY (INHALATION) at 04:06

## 2021-01-01 RX ADMIN — FERROUS SULFATE TAB 325 MG (65 MG ELEMENTAL FE) 325 MG: 325 (65 FE) TAB at 08:09

## 2021-01-01 RX ADMIN — NITROGLYCERIN 0.5 INCH: 20 OINTMENT TOPICAL at 18:44

## 2021-01-01 RX ADMIN — CARVEDILOL 6.25 MG: 6.25 TABLET, FILM COATED ORAL at 17:44

## 2021-01-01 RX ADMIN — TAMSULOSIN HYDROCHLORIDE 0.4 MG: 0.4 CAPSULE ORAL at 08:27

## 2021-01-01 RX ADMIN — LATANOPROST 1 DROP: 50 SOLUTION OPHTHALMIC at 22:27

## 2021-01-01 RX ADMIN — ISOSORBIDE MONONITRATE 60 MG: 30 TABLET, EXTENDED RELEASE ORAL at 13:47

## 2021-01-01 RX ADMIN — INSULIN LISPRO 2 UNITS: 100 INJECTION, SOLUTION INTRAVENOUS; SUBCUTANEOUS at 06:28

## 2021-01-01 RX ADMIN — NITROGLYCERIN 0.5 INCH: 20 OINTMENT TOPICAL at 13:14

## 2021-01-01 RX ADMIN — PIPERACILLIN AND TAZOBACTAM 3375 MG: 3; .375 INJECTION, POWDER, LYOPHILIZED, FOR SOLUTION INTRAVENOUS at 01:17

## 2021-01-01 RX ADMIN — ATORVASTATIN CALCIUM 40 MG: 40 TABLET, FILM COATED ORAL at 08:13

## 2021-01-01 RX ADMIN — IPRATROPIUM BROMIDE AND ALBUTEROL SULFATE 1 AMPULE: 2.5; .5 SOLUTION RESPIRATORY (INHALATION) at 04:13

## 2021-01-01 RX ADMIN — INSULIN LISPRO 2 UNITS: 100 INJECTION, SOLUTION INTRAVENOUS; SUBCUTANEOUS at 07:54

## 2021-01-01 RX ADMIN — METOPROLOL TARTRATE 25 MG: 25 TABLET, FILM COATED ORAL at 11:51

## 2021-01-01 RX ADMIN — INSULIN LISPRO 2 UNITS: 100 INJECTION, SOLUTION INTRAVENOUS; SUBCUTANEOUS at 11:21

## 2021-01-01 RX ADMIN — Medication 10 ML: at 20:23

## 2021-01-01 RX ADMIN — PERFLUTREN 1.65 MG: 6.52 INJECTION, SUSPENSION INTRAVENOUS at 10:07

## 2021-01-01 RX ADMIN — Medication 10 ML: at 21:31

## 2021-01-01 RX ADMIN — Medication 1000 MCG: at 08:09

## 2021-01-01 RX ADMIN — TAMSULOSIN HYDROCHLORIDE 0.4 MG: 0.4 CAPSULE ORAL at 08:42

## 2021-01-01 RX ADMIN — AMLODIPINE BESYLATE 5 MG: 5 TABLET ORAL at 07:47

## 2021-01-01 RX ADMIN — CEFTRIAXONE 1000 MG: 1 INJECTION, POWDER, FOR SOLUTION INTRAMUSCULAR; INTRAVENOUS at 01:26

## 2021-01-01 RX ADMIN — CARVEDILOL 6.25 MG: 6.25 TABLET, FILM COATED ORAL at 08:39

## 2021-01-01 RX ADMIN — INSULIN LISPRO 2 UNITS: 100 INJECTION, SOLUTION INTRAVENOUS; SUBCUTANEOUS at 06:39

## 2021-01-01 RX ADMIN — IPRATROPIUM BROMIDE AND ALBUTEROL SULFATE 1 AMPULE: 2.5; .5 SOLUTION RESPIRATORY (INHALATION) at 08:37

## 2021-01-01 RX ADMIN — METFORMIN HYDROCHLORIDE 500 MG: 500 TABLET, EXTENDED RELEASE ORAL at 08:09

## 2021-01-01 RX ADMIN — TAMSULOSIN HYDROCHLORIDE 0.4 MG: 0.4 CAPSULE ORAL at 09:12

## 2021-01-01 RX ADMIN — HYDROXYZINE PAMOATE 25 MG: 25 CAPSULE ORAL at 19:55

## 2021-01-01 RX ADMIN — INSULIN LISPRO 2 UNITS: 100 INJECTION, SOLUTION INTRAVENOUS; SUBCUTANEOUS at 20:20

## 2021-01-01 RX ADMIN — CARVEDILOL 6.25 MG: 6.25 TABLET, FILM COATED ORAL at 08:13

## 2021-01-01 RX ADMIN — INSULIN LISPRO 2 UNITS: 100 INJECTION, SOLUTION INTRAVENOUS; SUBCUTANEOUS at 20:04

## 2021-01-01 RX ADMIN — IPRATROPIUM BROMIDE AND ALBUTEROL SULFATE 1 AMPULE: 2.5; .5 SOLUTION RESPIRATORY (INHALATION) at 08:26

## 2021-01-01 RX ADMIN — HEPARIN SODIUM 10 UNITS/KG/HR: 10000 INJECTION, SOLUTION INTRAVENOUS at 07:37

## 2021-01-01 RX ADMIN — SODIUM CHLORIDE, PRESERVATIVE FREE 10 ML: 5 INJECTION INTRAVENOUS at 16:34

## 2021-01-01 RX ADMIN — ATORVASTATIN CALCIUM 40 MG: 40 TABLET, FILM COATED ORAL at 08:39

## 2021-01-01 RX ADMIN — INSULIN LISPRO 2 UNITS: 100 INJECTION, SOLUTION INTRAVENOUS; SUBCUTANEOUS at 08:30

## 2021-01-01 RX ADMIN — NITROGLYCERIN 0.5 INCH: 20 OINTMENT TOPICAL at 00:33

## 2021-01-01 RX ADMIN — OXYCODONE HYDROCHLORIDE AND ACETAMINOPHEN 500 MG: 500 TABLET ORAL at 08:45

## 2021-01-01 RX ADMIN — ASPIRIN 81 MG: 81 TABLET, COATED ORAL at 10:19

## 2021-01-01 RX ADMIN — INSULIN LISPRO 1 UNITS: 100 INJECTION, SOLUTION INTRAVENOUS; SUBCUTANEOUS at 22:23

## 2021-01-01 ASSESSMENT — ENCOUNTER SYMPTOMS
BLOOD IN STOOL: 0
CONSTIPATION: 0
ABDOMINAL PAIN: 0
NAUSEA: 0
SORE THROAT: 0
CHEST TIGHTNESS: 0
SORE THROAT: 0
SHORTNESS OF BREATH: 1
NAUSEA: 0
ABDOMINAL DISTENTION: 0
NAUSEA: 0
COUGH: 0
ABDOMINAL PAIN: 0
ABDOMINAL PAIN: 0
DIARRHEA: 0
CHEST TIGHTNESS: 0
COUGH: 0
NAUSEA: 0
RHINORRHEA: 0
VOMITING: 0
SORE THROAT: 0
DIARRHEA: 0
EYE REDNESS: 0
VOMITING: 0
EYE REDNESS: 0
BACK PAIN: 0
DIARRHEA: 0
VOMITING: 0
BACK PAIN: 0
NAUSEA: 0
COUGH: 0
ABDOMINAL PAIN: 0
RHINORRHEA: 0
WHEEZING: 0
DIARRHEA: 0
BACK PAIN: 0
SHORTNESS OF BREATH: 0
ABDOMINAL DISTENTION: 0
CHEST TIGHTNESS: 0
VOMITING: 0
SHORTNESS OF BREATH: 0
COUGH: 0
ABDOMINAL PAIN: 0
RHINORRHEA: 0
BLOOD IN STOOL: 0
SHORTNESS OF BREATH: 0
EYE REDNESS: 0
VOMITING: 0
PHOTOPHOBIA: 0
EYE REDNESS: 0
BLOOD IN STOOL: 0
NAUSEA: 0
SHORTNESS OF BREATH: 1
RHINORRHEA: 0
SHORTNESS OF BREATH: 0
PHOTOPHOBIA: 0
COUGH: 0
COUGH: 0
SORE THROAT: 0
BLOOD IN STOOL: 0
SORE THROAT: 0
EYE PAIN: 0
ABDOMINAL PAIN: 0
DIARRHEA: 0
CHEST TIGHTNESS: 0

## 2021-01-01 ASSESSMENT — PAIN DESCRIPTION - PROGRESSION
CLINICAL_PROGRESSION: NOT CHANGED
CLINICAL_PROGRESSION: NOT CHANGED

## 2021-01-01 ASSESSMENT — PAIN DESCRIPTION - FREQUENCY
FREQUENCY: CONTINUOUS
FREQUENCY: CONTINUOUS

## 2021-01-01 ASSESSMENT — PAIN SCALES - GENERAL
PAINLEVEL_OUTOF10: 0
PAINLEVEL_OUTOF10: 5
PAINLEVEL_OUTOF10: 0
PAINLEVEL_OUTOF10: 2
PAINLEVEL_OUTOF10: 0
PAINLEVEL_OUTOF10: 10
PAINLEVEL_OUTOF10: 0
PAINLEVEL_OUTOF10: 1
PAINLEVEL_OUTOF10: 3
PAINLEVEL_OUTOF10: 0
PAINLEVEL_OUTOF10: 4
PAINLEVEL_OUTOF10: 0
PAINLEVEL_OUTOF10: 6
PAINLEVEL_OUTOF10: 2
PAINLEVEL_OUTOF10: 0

## 2021-01-01 ASSESSMENT — PAIN SCALES - PAIN ASSESSMENT IN ADVANCED DEMENTIA (PAINAD)
NEGVOCALIZATION: 0
CONSOLABILITY: 0
BODYLANGUAGE: 0
CONSOLABILITY: 0
NEGVOCALIZATION: 0
TOTALSCORE: 0
FACIALEXPRESSION: 0
TOTALSCORE: 0
BREATHING: 0
TOTALSCORE: 0
NEGVOCALIZATION: 0
TOTALSCORE: 0
FACIALEXPRESSION: 0
BREATHING: 0
BODYLANGUAGE: 0
CONSOLABILITY: 0
FACIALEXPRESSION: 0
BREATHING: 0
CONSOLABILITY: 0
FACIALEXPRESSION: 0
NEGVOCALIZATION: 0
BREATHING: 0
BODYLANGUAGE: 0
BODYLANGUAGE: 0

## 2021-01-01 ASSESSMENT — PAIN DESCRIPTION - PAIN TYPE
TYPE: ACUTE PAIN

## 2021-01-01 ASSESSMENT — PAIN DESCRIPTION - LOCATION
LOCATION: CHEST
LOCATION: TEETH
LOCATION: HEAD

## 2021-01-01 ASSESSMENT — PAIN DESCRIPTION - DESCRIPTORS
DESCRIPTORS: ACHING;DULL
DESCRIPTORS: ACHING

## 2021-01-01 ASSESSMENT — PAIN - FUNCTIONAL ASSESSMENT: PAIN_FUNCTIONAL_ASSESSMENT: ACTIVITIES ARE NOT PREVENTED

## 2021-01-01 ASSESSMENT — PAIN DESCRIPTION - ONSET: ONSET: ON-GOING

## 2021-01-01 ASSESSMENT — PAIN DESCRIPTION - ORIENTATION: ORIENTATION: LEFT;LOWER

## 2021-06-15 PROBLEM — R53.1 WEAKNESS: Status: ACTIVE | Noted: 2021-01-01

## 2021-06-15 NOTE — ED PROVIDER NOTES
Gabrielle Mcintyre is an 75-year-old male with PMH of DM, CAD, HLD, HTN, presenting to emergency department concern for dysuria. Patient states he is having trouble urinating that is been present for 1 day. Patient states that he feels like he has to urinate but cannot get the urine out. Patient is only dribbling small amounts of urine. Patient does have a history of enlarged prostate. Patient does follow with urology. Patient denies any fever, chills, nausea, vomiting. Patient is not having any abdominal pain, chest pain, shortness of breath. Patient has not tried any thing for symptoms and nothing make symptoms better or worse, symptoms are moderate in severity. Patient denies hematuria. The history is provided by the patient and medical records. Review of Systems   Constitutional: Negative for chills, diaphoresis, fatigue and fever. Eyes: Negative for photophobia and visual disturbance. Respiratory: Negative for cough, chest tightness and shortness of breath. Cardiovascular: Negative for chest pain, palpitations and leg swelling. Gastrointestinal: Negative for abdominal distention, abdominal pain, diarrhea, nausea and vomiting. Genitourinary: Positive for difficulty urinating and dysuria. Negative for decreased urine volume, discharge, flank pain, frequency, hematuria, scrotal swelling and testicular pain. Musculoskeletal: Negative for back pain, neck pain and neck stiffness. Skin: Negative for pallor and rash. Neurological: Negative for headaches. Psychiatric/Behavioral: Negative for confusion. Physical Exam  Vitals and nursing note reviewed. Constitutional:       General: He is not in acute distress. Appearance: Normal appearance. He is not ill-appearing. HENT:      Head: Normocephalic and atraumatic. Eyes:      General: No scleral icterus. Conjunctiva/sclera: Conjunctivae normal.      Pupils: Pupils are equal, round, and reactive to light. Cardiovascular:      Rate and Rhythm: Normal rate and regular rhythm. Pulmonary:      Effort: Pulmonary effort is normal.      Breath sounds: Normal breath sounds. Abdominal:      General: Bowel sounds are normal. There is no distension. Palpations: Abdomen is soft. Tenderness: There is no abdominal tenderness. There is no guarding or rebound. Musculoskeletal:      Cervical back: Normal range of motion and neck supple. No rigidity. No muscular tenderness. Right lower leg: No edema. Left lower leg: No edema. Skin:     General: Skin is warm and dry. Capillary Refill: Capillary refill takes less than 2 seconds. Coloration: Skin is not pale. Findings: No erythema or rash. Neurological:      Mental Status: He is alert and oriented to person, place, and time. Psychiatric:         Mood and Affect: Mood normal.          Procedures     MDM  Number of Diagnoses or Management Options  Anemia, unspecified type  Urinary tract infection with hematuria, site unspecified  Diagnosis management comments: Odalys Grier is an 78year old male who presented to ED with concerns for dysuria. Barrera catheter was placed to evaluate for urinary retention with less than 100 cc of output from Barrera. Patient was found to have a urinary tract infection. Barrera catheter was immediately removed. Patient was started on Omnicef and urine culture is pending. Patient normal renal function did not have a leukocytosis on lab work. Patient not having any additional complaints and feels well. Patient not having any confusion or concerning findings for metabolic encephalopathy or sepsis at this time. Patient's wife is comfortable taking him home at this time.  Discussed results and plan with patient and wife along with indications return to ED they are agreeable to plan       Amount and/or Complexity of Data Reviewed  Decide to obtain previous medical records or to obtain history from someone other than the patient: yes              --------------------------------------------- PAST HISTORY ---------------------------------------------  Past Medical History:  has a past medical history of Arthritis, BPH (benign prostatic hypertrophy), CAD (coronary artery disease), Cervical spondylosis, Constipation, CTS (carpal tunnel syndrome), Diabetes mellitus ([facility] [address] DM type 2 (diabetes mellitus, type 2) ([facility] Exogenous obesity, Glaucoma, History of EKG, Hypercholesterolemia, Hypertension, OA (osteoarthritis), Partial blindness, Polyp of colon, PONV (postoperative nausea and vomiting), Preoperative clearance, Vitamin B12 deficiency, Vitamin D deficiency, and Wears glasses. Past Surgical History:  has a past surgical history that includes Diagnostic Cardiac Cath Lab Procedure (1990 approximately); eye surgery (Bilateral); Total shoulder arthroplasty (Left, 2000); Cataract removal with implant (Bilateral); Total knee arthroplasty (Right, 11/30/2015); Colonoscopy (10/31/2013); and joint replacement (Right, 12/08/2015). Social History:  reports that he quit smoking about 40 years ago. His smoking use included cigars. He started smoking about 67 years ago. He has a 120.00 pack-year smoking history. He quit smokeless tobacco use about 5 years ago. He reports that he does not drink alcohol and does not use drugs. Family History: family history includes Heart Disease in his mother; Heart Disease (age of onset: 52) in his father. The patients home medications have been reviewed. Allergies: Ace inhibitors;  Anesthetics, amide; and Vicodin [hydrocodone-acetaminophen]    -------------------------------------------------- RESULTS -------------------------------------------------  Labs:  Results for orders placed or performed during the hospital encounter of 06/14/21   Culture, Urine    Specimen: Urine, clean catch   Result Value Ref Range    Organism Escherichia coli (A)     Urine Culture, Routine >100,000 CFU/ml  Sensitivity to follow      CBC auto differential   Result Value Ref Range    WBC 7.0 4.5 - 11.5 E9/L    RBC 2.87 (L) 3.80 - 5.80 E12/L    Hemoglobin 10.6 (L) 12.5 - 16.5 g/dL    Hematocrit 31.0 (L) 37.0 - 54.0 %    .0 (H) 80.0 - 99.9 fL    MCH 36.9 (H) 26.0 - 35.0 pg    MCHC 34.2 32.0 - 34.5 %    RDW 15.4 (H) 11.5 - 15.0 fL    Platelets 600 386 - 858 E9/L    MPV 10.1 7.0 - 12.0 fL    Neutrophils % 80.0 43.0 - 80.0 %    Lymphocytes % 11.3 (L) 20.0 - 42.0 %    Monocytes % 5.2 2.0 - 12.0 %    Eosinophils % 0.1 0.0 - 6.0 %    Basophils % 0.1 0.0 - 2.0 %    Neutrophils Absolute 5.88 1.80 - 7.30 E9/L    Lymphocytes Absolute 0.77 (L) 1.50 - 4.00 E9/L    Monocytes Absolute 0.35 0.10 - 0.95 E9/L    Eosinophils Absolute 0.00 (L) 0.05 - 0.50 E9/L    Basophils Absolute 0.00 0.00 - 0.20 E9/L    Metamyelocytes Relative 0.9 0.0 - 1.0 %    Myelocyte Percent 2.6 0 - 0 %    nRBC 0.9 /100 WBC    Anisocytosis 1+     Polychromasia 2+     Poikilocytes 2+     Schistocytes 1+     Ovalocytes 2+     Tear Drop Cells 1+     Basophilic Stippling 1+    Comprehensive Metabolic Panel w/ Reflex to MG   Result Value Ref Range    Sodium 136 132 - 146 mmol/L    Potassium reflex Magnesium 4.0 3.5 - 5.0 mmol/L    Chloride 99 98 - 107 mmol/L    CO2 25 22 - 29 mmol/L    Anion Gap 12 7 - 16 mmol/L    Glucose 151 (H) 74 - 99 mg/dL    BUN 23 6 - 23 mg/dL    CREATININE 1.2 0.7 - 1.2 mg/dL    GFR Non-African American 58 >=60 mL/min/1.73    GFR African American >60     Calcium 9.3 8.6 - 10.2 mg/dL    Total Protein 7.2 6.4 - 8.3 g/dL    Albumin 4.5 3.5 - 5.2 g/dL    Total Bilirubin 0.8 0.0 - 1.2 mg/dL    Alkaline Phosphatase 80 40 - 129 U/L    ALT 11 0 - 40 U/L    AST 14 0 - 39 U/L   Urinalysis with Microscopic   Result Value Ref Range    Color, UA Yellow Straw/Yellow    Clarity, UA Clear Clear    Glucose, Ur >=1000 (A) Negative mg/dL    Bilirubin Urine Negative Negative    Ketones, Urine 15 (A) Negative mg/dL    Specific Gravity, UA 1.015 1.005 - 1.030    Blood, Urine SMALL (A) Negative    pH, UA 8.0 5.0 - 9.0    Protein, UA Negative Negative mg/dL    Urobilinogen, Urine 1.0 <2.0 E.U./dL    Nitrite, Urine POSITIVE (A) Negative    Leukocyte Esterase, Urine TRACE (A) Negative    WBC, UA 2-5 0 - 5 /HPF    RBC, UA 2-5 0 - 2 /HPF    Bacteria, UA FEW (A) None Seen /HPF       Radiology:  No orders to display       ------------------------- NURSING NOTES AND VITALS REVIEWED ---------------------------  Date / Time Roomed:  6/14/2021 11:27 PM  ED Bed Assignment:  BAIN D/EMILY    The nursing notes within the ED encounter and vital signs as below have been reviewed. BP (!) 154/74   Pulse 88   Temp 97.3 °F (36.3 °C)   Resp 20   Ht 5' 11\" (1.803 m)   Wt 210 lb (95.3 kg)   SpO2 95%   BMI 29.29 kg/m²   Oxygen Saturation Interpretation: Normal      ------------------------------------------ PROGRESS NOTES ------------------------------------------  12:45 PM EDT  I have spoken with the patient and discussed todays results, in addition to providing specific details for the plan of care and counseling regarding the diagnosis and prognosis. Their questions are answered at this time and they are agreeable with the plan. I discussed at length with them reasons for immediate return here for re evaluation. They will followup with their primary care physician by calling their office tomorrow. --------------------------------- ADDITIONAL PROVIDER NOTES ---------------------------------  At this time the patient is without objective evidence of an acute process requiring hospitalization or inpatient management. They have remained hemodynamically stable throughout their entire ED visit and are stable for discharge with outpatient follow-up. The plan has been discussed in detail and they are aware of the specific conditions for emergent return, as well as the importance of follow-up.       Discharge Medication List as of 6/15/2021 12:47 AM      START taking these medications    Details cefdinir (OMNICEF) 300 MG capsule Take 1 capsule by mouth 2 times daily for 10 days, Disp-20 capsule, R-0Print             Diagnosis:  1. Urinary tract infection with hematuria, site unspecified    2. Anemia, unspecified type        Disposition:  Patient's disposition: Discharge to home  Patient's condition is stable.                Antonina España MD  Resident  06/16/21 3230

## 2021-06-15 NOTE — ED NOTES
Bed: HD  Expected date:   Expected time:   Means of arrival: [facility]  Comments:  [facility]     Bhaskar Weaver RN  06/14/21 4455

## 2021-06-16 NOTE — CONSULTS
Inpatient Cardiology Consultation      Reason for Consult:  Elevated troponin     Consulting Physician: Dr Payal Fernandez    Requesting Physician:  Dr Anselmo Shea    Date of Consultation: 6/16/2021    HISTORY OF PRESENT ILLNESS: 79 yo  male previously known to Dr Rashel Villa but was seen as inpatient consult 6/2020 by Dr Payal Fernandez then seen by Dr Nurys Roberts 11/2020 as inpatient. PMH: HTN, HLD,  T2DM, positive stress 1994  s/p PTCA in 1994, non ischemic stress 6/2020, mild AS on TTE 11/2020 (EF 60-65%), and ex smoker quit in 1981. [place] 6/15/2021 reportedly for weakness although when asked patient initially said SOB when I asked him about the SOB he said \"what are you talking about I have an infection. \" Denies any CP, SOB,, dizziness, weakness. Does report some urinary \"burning\" Patient was disinterested in answering questions as he was eating his breakfast.     + UTI, Hgb 10.6, Bun/Cr 23/1.2-->19/1.2.   BP upon arrival 128/57 current /46 remains SR in 60's. Placed on Rocephin, on NSS at 100/hr. Please note: past medical records were reviewed per electronic medical record (EMR) - see detailed reports under Past Medical/ Surgical History. Past Medical History:    1. ACEI have causes LIZZ in past  2. 120 pack years quit in 1981  3. HTN  4. HLD  5. Anemia  6. Chronic back and neck pain  7. cRBBB  8. Bilateral carpal tunnel syndrome  9. Vitamin B12 deficiency   10. 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 BPH  12. 11/4/1994 [facility] Dr Johnny Flynn EF 55%. Severe inferobasilar hypokinesis.  RCA. 70% stenosis in very large OM2, Diffuse moderate disease throughout proximal LAD. Diffuse moderate disease of 1st and 2nd diagonal.   13. 11/7/1994 [facility] Dr Almeida Primer: PTCA of LCx--> reduction to 25%s stenosis  14. 2006 Nonischemic stress, NWM.  15. 6/2020 Lexiscan MPS: Non ischemic  16. 11/2020 TTE Dr Nurys Roberts: EF 60-65%. Mild AS HANNAH 1.9. Moderately dilated LA. 17. Ambulates with cane  18.  Completed COVID Vaccine Pfizer      Past Surgical History:    R TKA, L shoulder surgery, bilateral cataract removal, ? Neck surgery, colonoscopy s/p polypectomy      Medications Prior to admit:  Prior to Admission medications    Medication Sig Start Date End Date Taking? Authorizing Provider   pantoprazole (PROTONIX) 40 MG tablet TAKE 1 TABLET BY MOUTH EVERY DAY BEFORE BREAKFAST 6/15/21  Yes Mari Grajeda MD   metFORMIN (GLUCOPHAGE-XR) 500 MG extended release tablet Take 500 mg by mouth 3 times daily (with meals)    Yes Historical Provider, MD   oxybutynin (DITROPAN XL) 15 MG extended release tablet Take 15 mg by mouth daily   Yes Historical Provider, MD   valsartan (DIOVAN) 80 MG tablet Take 1 tablet by mouth daily 3/15/21  Yes Mari Grajeda MD   amLODIPine (NORVASC) 5 MG tablet TAKE 1 TABLET BY MOUTH EVERY DAY AT NIGHT 3/8/21  Yes Mari Grajeda MD   metoprolol tartrate (LOPRESSOR) 50 MG tablet TAKE 1 TABLET BY MOUTH EVERY DAY 3/8/21  Yes Mari Grajeda MD   atorvastatin (LIPITOR) 40 MG tablet TAKE 1 TABLET BY MOUTH EVERY DAY 3/8/21  Yes Mari Grajeda MD   vitamin D (ERGOCALCIFEROL) 1.25 MG (94225 UT) CAPS capsule Take 1 capsule by mouth once a week for 12 doses 12/7/20 6/15/21 Yes Mari Grajeda MD   dapagliflozin (FARXIGA) 10 MG tablet TAKE 1 TABLET BY MOUTH EVERY DAY IN THE MORNING 12/4/20  Yes Mari Grajeda MD   vitamin E 400 UNIT capsule Take 1 capsule by mouth daily 6/14/20  Yes Mari Grajeda MD   mirabegron (MYRBETRIQ) 25 MG TB24 Take 1 tablet by mouth three times a week Mon-Wed-& Friday 1/27/20  Yes Mari Grajeda MD   tamsulosin (FLOMAX) 0.4 MG capsule TAKE 1 CAPSULE BY MOUTH EVERYDAY AT BEDTIME 10/8/19  Yes Historical Provider, MD   blood glucose test strips (FREESTYLE TEST STRIPS) strip Indications: freestyle test strips DX: E11.9 As needed.     Diagnosis is E 11.9 6/11/19  Yes Mari Grajeda MD   FREESTYLE LANCETS MISC 1 each by Does not apply route daily Indications: in AM DX: E11.9 6/11/19  Yes Mari Grajeda MD lispro (HUMALOG) injection vial 0-6 Units, 0-6 Units, Subcutaneous, Nightly  glucose (GLUTOSE) 40 % oral gel 15 g, 15 g, Oral, PRN  dextrose 50 % IV solution, 12.5 g, Intravenous, PRN  glucagon (rDNA) injection 1 mg, 1 mg, Intramuscular, PRN  dextrose 5 % solution, 100 mL/hr, Intravenous, PRN  perflutren lipid microspheres (DEFINITY) injection 1.65 mg, 1.5 mL, Intravenous, ONCE PRN  0.9 % sodium chloride infusion, , Intravenous, Continuous  cefTRIAXone (ROCEPHIN) 1,000 mg in sterile water 10 mL IV syringe, 1,000 mg, Intravenous, Q24H  enoxaparin (LOVENOX) injection 40 mg, 40 mg, Subcutaneous, Daily    Allergies:  Ace inhibitors; Anesthetics, amide; and Vicodin [hydrocodone-acetaminophen]   ACEI causes LIZZ    Social History:    120 pack years quit in [address] [place] [place] [address] ETOH/Illicit Drugs  Activity: Lives with wife in 1 story home with basement. Ambulates with cane. Does not drive. Code Status: Full Code      Family History: Noncontributory secondary to age      REVIEW OF SYSTEMS:     · Constitutional: Denies fatigue, fevers, chills or night sweats  · Eyes: Denies visual changes or drainage  · ENT: Denies headaches or hearing loss. No mouth sores or sore throat. No epistaxis   · Cardiovascular: Denies chest pain, pressure or palpitations. No lower extremity swelling. · Respiratory: Denies AVERY, cough, orthopnea or PND. No hemoptysis   · Gastrointestinal: Denies hematemesis or anorexia. No hematochezia or melena    · Genitourinary: + dysuria. Denies urgency, or hematuria. · Musculoskeletal: Denies gait disturbance, weakness or joint complaints  · Integumentary: Denies rash, hives or pruritis   · Neurological: Denies dizziness, headaches or seizures. No numbness or tingling  · Psychiatric: Denies anxiety or depression. · Endocrine: Denies temperature intolerance. No recent weight change. .  · Hematologic/Lymphatic: Denies abnormal bruising or bleeding.  No swollen lymph nodes    PHYSICAL EXAM:   BP (!) 117/54   Pulse 66 06/16/21  0906   TROPHS 1,671* 1,903*     FASTING LIPID PANEL:  Lab Results   Component Value Date    CHOL 144 03/11/2021    HDL 36 03/11/2021    LDLCALC 71 03/11/2021    TRIG 186 03/11/2021     LIVER PROFILE:  Recent Labs     06/15/21  2102 06/16/21  0444   AST 73* 53*   ALT 23 21   LABALBU 4.2 3.3*   A&P per Dr Larry Almeida  Electronically signed by Alma Delia Haq. KATHRYN Madden on 6/16/2021 at 11:42 AM     Cardiology consult note     Upon my arrival he adamantly refuses to see me and to see cardiology this admission. He was lying flat in bed he was comfortable and in no distress. He adamantly denies any chest discomfort or shortness of breath. Cardiology will sign off.

## 2021-06-16 NOTE — PROGRESS NOTES
safety and independence with ADLs  * Neuro-muscular re-education: facilitation of righting/equilibrium reactions, midline orientation, scapular stability/mobility, normalization of muscle tone, and facilitation of volitional active controled movement    Home Living: Pt lives with wife in a 1 story home; bed/bath on main floor   Bathroom setup: walk in shower   Equipment owned: shower chair, BSC, ww  Prior Level of Function: IND with ADLs/IADLs; using cane for functional mobility   Driving: no    Pain Level: 0/10  Cognition: A&O: 3/4; Follows 1 step directions, with repetition and increased time   Memory:  fair    Sequencing:  fair    Problem solving:  fair    Judgement/safety:  fair     Functional Assessment:  AM-PAC Daily Activity Raw Score: 18/24   Initial Eval Status  Date: 6/16/21 Treatment Status  Date: STGs=LTGs  Time Frame: 10-14 days   Feeding SUP (assist opening packages, pt able to self feed)   IND   Grooming SBA (seated)   IND   UB Dressing Min A (simulated)   SBA   LB Dressing Min A (pt flexed at hips to doff/leon B socks with increased time)  SBA    Bathing Min A (simulated)  SBA    Toileting Min A   SBA   Bed Mobility  Log roll: NT  Supine to sit: NT   Sit to supine: NT   Log roll IND  Supine to sit: IND   Sit to supine: IND   Functional Transfers Sit to stand:SBA   Stand to sit:SBA  Commode: SBA  SUP   Functional Mobility SBA (using ww, to/from bathroom)  SUP   Balance Sitting: SBA  Standing: SBA     Activity Tolerance fair     Visual/  Perceptual Glasses: yes              UE ROM: BUE: elbow WFL, shoulder flex grossly 110'  Strength: RUE: grossly 3+/5 LUE: grossly 3+/5   Strength: B WFL  Fine Motor Coordination:  WFL     Hearing: [place]  Sensation:  No c/o numbness/tingling   Tone:  WFL  Edema: BLEs                            Comments:Cleared by RN to see pt. Upon arrival, patient sitting in chair and agreeable to OT session.  At end of session, patient sitting in chair with call light and phone within reach, all lines and tubes intact. Pt would benefit from continued OT to increase functional independence and quality of life. Treatment: Completed ADLs/functional transfers, see above for assessment. Pt appeared to have tolerated session fairly and appears cooperative/pleasant . Pt instructed on use of call light for assistance and fall prevention. Pt demo'ing fair understanding of education provided. Continue to educate. Eval Complexity: low    Rehab Potential: Good for established goals, pt. assisted in establishment of goals. LTG: maximize independence with ADLs to return to PLOF    Patient and/or family were instructed on diagnosis, prognosis/goals and plan of care. Demonstrated fair understanding. [] Malnutrition indicators have been identified and nursing has been notified to ensure a dietitian consult is ordered. Evaluation time includes thorough review of current medical information, gathering information on past medical & social history & PLOF, completion of standardized testing, informal observation of tasks, consultation with other medical professions/disciplines, assessment of data & development of POC/goals.      Time In: 0845       Time Out: 4274     Total treatment time: 0       Treatment Charges: Mins Units   OT Eval Low 74730 X    OT Eval Medium 78916     OT Eval High [id number]     OT Re-Eval [id number]     Ther Ex  70379       Manual Therapy 85583       Thera Activities 17836       ADL/Home Mgt 14943     Neuro Re-ed 38644       Group Therapy        Orthotic manage/training  23701       Non-Billable Time           Maritza Osorio OTR/THUY [id number]

## 2021-06-16 NOTE — H&P
Torey Whalen MD FACP   History and Physical      CHIEF COMPLAINT: Weakness      HISTORY OF PRESENT ILLNESS:    68-year-old  man second ER visit in 24 hours  Diagnosed with UTI and was given antibiotics  Patient apparently lost the prescriptions  Symptoms of weakness where he was unable to ambulate got worse  He had some dysuria and frequency  No fever or chills  High-sensitivity troponin was markedly elevated  He denied chest pain shortness of breath diaphoresis  Admitted for further management  Data reviewed in detail  Spoke with the ER physician at the time of admission  Patient was seen on the floor earlier this morning  He refuses any further work-up    Past Medical History:    Past Medical History:   Diagnosis Date    Arthritis     bilateral knee    BPH (benign prostatic hypertrophy) 8/4/2016    CAD (coronary artery disease)     Cervical spondylosis 8/4/2016    Constipation     prune juice in am    CTS (carpal tunnel syndrome) 8/4/2016    Diabetes mellitus ([facility] [address]     DM type 2 (diabetes mellitus, type 2) ([facility] [address] 8/4/2016    Exogenous obesity 8/4/2016    Glaucoma     bilateral    History of EKG 10/14/2015 per Dr Mary Bazzi on chart.     Hypercholesterolemia     Hypertension     OA (osteoarthritis) 8/4/2016    Partial blindness     left eye    Polyp of colon 8/4/2016    PONV (postoperative nausea and vomiting)     Preoperative clearance 11/12/2015    per Dr Mary Bazzi on chart; for right tjak Dr Lesia Pierce Vitamin B12 deficiency 8/4/2016    Vitamin D deficiency 8/4/2016    Wears glasses        Past Surgical History:    Past Surgical History:   Procedure Laterality Date    CATARACT REMOVAL WITH IMPLANT Bilateral     COLONOSCOPY  10/31/2013    DIAGNOSTIC CARDIAC CATH LAB PROCEDURE  1990 approximately    ? stent    EYE SURGERY Bilateral     cataract    JOINT REPLACEMENT Right 12/08/2015    SHOULDER ARTHROPLASTY Left 2000    TOTAL KNEE ARTHROPLASTY Right 11/30/2015       Medications Prior to Admission:    Medications Prior to Admission: pantoprazole (PROTONIX) 40 MG tablet, TAKE 1 TABLET BY MOUTH EVERY DAY BEFORE BREAKFAST  metFORMIN (GLUCOPHAGE-XR) 500 MG extended release tablet, Take 500 mg by mouth 3 times daily (with meals)   oxybutynin (DITROPAN XL) 15 MG extended release tablet, Take 15 mg by mouth daily  valsartan (DIOVAN) 80 MG tablet, Take 1 tablet by mouth daily  amLODIPine (NORVASC) 5 MG tablet, TAKE 1 TABLET BY MOUTH EVERY DAY AT NIGHT  metoprolol tartrate (LOPRESSOR) 50 MG tablet, TAKE 1 TABLET BY MOUTH EVERY DAY  atorvastatin (LIPITOR) 40 MG tablet, TAKE 1 TABLET BY MOUTH EVERY DAY  vitamin D (ERGOCALCIFEROL) 1.25 MG (31275 UT) CAPS capsule, Take 1 capsule by mouth once a week for 12 doses  dapagliflozin (FARXIGA) 10 MG tablet, TAKE 1 TABLET BY MOUTH EVERY DAY IN THE MORNING  vitamin E 400 UNIT capsule, Take 1 capsule by mouth daily  mirabegron (MYRBETRIQ) 25 MG TB24, Take 1 tablet by mouth three times a week Mon-Wed-& Friday  tamsulosin (FLOMAX) 0.4 MG capsule, TAKE 1 CAPSULE BY MOUTH EVERYDAY AT BEDTIME  blood glucose test strips (FREESTYLE TEST STRIPS) strip, Indications: freestyle test strips DX: E11.9 As needed.     Diagnosis is E 11.9  FREESTYLE LANCETS MISC, 1 each by Does not apply route daily Indications: in AM DX: E11.9  Continuous Blood Gluc Sensor ([address] [place] [address]) MISC, 1 Units by Does not apply route 3 times daily  Continuous Blood Gluc  (FREESTYLE MAYELA READER) WALTER, 1 Units by Does not apply route 3 times daily e11.9  aspirin 81 MG EC tablet, Take 81 mg by mouth nightly  Coenzyme Q10 (CO Q-10) 100 MG CAPS, Take 100 mg by mouth nightly  Omega-3 Fatty Acids (FISH OIL) 300 MG CAPS, Take 1 capsule by mouth Daily with supper  vitamin B-12 (CYANOCOBALAMIN) 1000 MCG tablet, Take 1,000 mcg by mouth daily  Ascorbic Acid (VITAMIN C) 500 MG tablet, Take 500 mg by mouth daily  latanoprost (XALATAN) 0.005 % ophthalmic solution, Place 1 drop into the right eye nightly     Allergies:    Ace inhibitors; Anesthetics, amide; and Vicodin [hydrocodone-acetaminophen]    Social History:    reports that he quit smoking about 40 years ago. His smoking use included cigars. He started smoking about 67 years ago. He has a 120.00 pack-year smoking history. He quit smokeless tobacco use about 5 years ago. He reports that he does not drink alcohol and does not use drugs. Family History:   family history includes Heart Disease in his mother; Heart Disease (age of onset: 52) in his father. REVIEW OF SYSTEMS:  As above in the HPI, otherwise negative    PHYSICAL EXAM:    Vitals:  BP (!) 94/49   Pulse 70   Temp 97.6 °F (36.4 °C) (Oral)   Resp 16   Ht 5' 11\" (1.803 m)   Wt 210 lb (95.3 kg)   SpO2 98%   BMI 29.29 kg/m²     General:  Awake, alert, oriented X 3. Well developed, well nourished, well groomed. No apparent distress. Morbidly obese  HEENT:  Normocephalic, atraumatic. Pupils equal, round, reactive to light. No scleral icterus. No conjunctival injection. No nasal discharge. Neck:  Supple  Heart:  RRR, no murmurs, gallops, rubs  Lungs:  CTA bilaterally, bilat symmetrical expansion, no wheeze, rales, or rhonchi  Abdomen:   Bowel sounds present, soft, nontender, no masses, no organomegaly, no peritoneal signs  Extremities:  No clubbing, cyanosis, or edema  Skin:  Warm and dry, no open lesions or rash  Neuro:  Cranial nerves 2-12 intact, no focal deficits  Breast: deferred  Rectal: deferred  Genitalia:  deferred    LABS:  Data reviewed in detail      ASSESSMENT:      Active Problems:    Generalized weakness  Acute cystitis suspected  Abnormal troponin  Known history of coronary artery disease  Patient Active Problem List   Diagnosis    Hypertension    Hypercholesterolemia    CAD (coronary artery disease)    Benign prostatic hyperplasia    CTS (carpal tunnel syndrome)    Cervical spondylosis    DM type 2 (diabetes mellitus, type 2) ([facility] [address]    Exogenous obesity    OA (osteoarthritis)    Polyp of colon    Vitamin B12 deficiency    Vitamin D deficiency    Morbidly obese (HCC)    Asthmatic bronchitis, mild intermittent, uncomplicated    Chest pain    Generalized weakness           PLAN:  Rocephin IV  DVT prophylaxis with Lovenox  Resume all other medications from home  Data reviewed in detail with the patient    Woody Hernandez MD  2:20 PM  6/16/2021

## 2021-06-16 NOTE — PROGRESS NOTES
Physical Therapy  Physical Therapy Initial Assessment     Name: Audrey Dodge  : 1940  MRN: [id number]      Date of Service: 2021    Evaluating PT:  Mala Heaton, PT, DPT  [id number]     Room #:  4501/4501-B  Diagnosis:  Weakness [R53.1]  PMHx/PSHx: Arthritis, BPH, CAD, cervical spondylosis, constipation, CTS, DM, exogenous obesity, glaucoma, hypertension, hypercholesterolemia, OA, polyp of colon, partial blindness L eye  Precautions:  Falls  Equipment Needs: n/a    SUBJECTIVE:    Pt lives with wife in a 1 story home with 2 stairs to enter and B hand rail. Bedroom and bathroom are on the same level. Pt ambulated with cane PTA. OBJECTIVE:   Initial Evaluation  Date: 2021 Treatment Short Term/ Long Term   Goals   AM-PAC 6 Clicks 27/00     Was pt agreeable to Eval/treatment? yes     Does pt have pain? no     Bed Mobility  Rolling: NT  Supine to sit: Min A  Sit to supine: NT  Scooting: SBA  Rolling: independent  Supine to sit: independent  Sit to supine: independent  Scooting: independent   Transfers Sit to stand: SBA  Stand to sit: SBA  Stand pivot: SBA  Sit to stand: independent  Stand to sit: independent  Stand pivot: independent   Ambulation    50 feet with FWW Min A  >200 feet with AAD Modified Independent    Stair negotiation: ascended and descended  NT  >2 steps with B hand rail Modified Independent    ROM BUE: WFL  BLE:  WFL     Strength BUE:  Per OT eval   BLE:  R, L hip flexors 3+/5  BLE grossly 5/5     Balance Sitting EOB:  SBA  Dynamic Standing:  Min A with FWW  Sitting EOB:  independent  Dynamic Standing:  AAD Modified Independent      Pt is A & O x 3  Sensation:  Pt denies numbness and tingling to extremities  Edema:  Unremarkable     Therapeutic Exercises:    BLE AROM    Patient education  Pt educated on PT role, safety during functional mobility, FWW usage.     Patient response to education:   Pt verbalized understanding Pt demonstrated skill Pt requires further education in this area   yes yes no     ASSESSMENT:    Conditions Requiring Skilled Therapeutic Intervention:    [x]Decreased strength     []Decreased ROM  [x]Decreased functional mobility  [x]Decreased balance   [x]Decreased endurance   [x]Decreased posture  []Decreased sensation  [x]Decreased coordination   []Decreased vision  [x]Decreased safety awareness   []Increased pain       Comments:  Pt received in supine and agreeable to PT evaluation. Vitals monitored during session. Pt required minimal physical assistance to get to EOB. Pt agreed to ambulating in hallway. Pt demonstrated slow gait speed, forward flexed posture, B decreased foot clearance and decreased step length. Pt repetitive VC for FWW approximation/negotiation, and upright posture/upward gaze. Pt return to room d/t fatigue and decreased safety. Pt required VC to reach back for chair prior to sitting. Pt left in bedside chair with call button in reach, lines attached, and needs met. Treatment:  Patient practiced and was instructed in the following treatment:     Sitting EOB for >5 minutes for upright tolerance, postural awareness and BLE ROM   Bed mobility training - pt given verbal and tactile cues to facilitate proper sequencing and safety during supine>sit as well as provided with physical assistance to complete task    STS and pivot transfer training - pt educated on proper hand and foot placement, safety and sequencing, and use of FWW to safely complete sit<>stand and pivot transfers with hands on assistance to complete task safely    Gait training- pt was given verbal and tactile cues to facilitate FWW usage during ambulation as well as provided with physical assistance to complete task. VC for FWW negotiation/approximation and upright posture and upward gaze    Pt's/ family goals   1. Return home vs rehab    Prognosis is fair for reaching above PT goals.     Patient and or family understand(s) diagnosis, prognosis, and plan of 23687 -- minutes  [] Therapeutic activities 60141 -- minutes  [] Therapeutic exercises 83395 -- minutes  [] Neuromuscular reeducation 85458 -- minutes     Suzanne Almanza, SPT  Avani Green, PT, DPT  [id number]

## 2021-06-16 NOTE — PROGRESS NOTES
Pharmacy Note    Bouchra Martinez was ordered Co Q-10 caps. As per the [address] [place] [address], herbals and certain dietary supplements will be discontinued.   The herbal or dietary supplement may be continued after discharge from the hospital.

## 2021-06-16 NOTE — CONSULTS
Cardiology consult note    Upon my arrival he adamantly refuses to see me and to see cardiology this admission. He was lying flat in bed he was comfortable and in no distress. He adamantly denies any chest discomfort or shortness of breath. Cardiology will sign off.

## 2021-06-16 NOTE — PROGRESS NOTES
Bouchra Martinez was ordered [place] which is a nonformulary medication. The patient's home supply of this medication must be brought in to the hospital for inpatient use. If the medication has not been administered by 1400 on the following day from the time the order was placed, a pharmacist will follow-up with the nurse of the patient to assess the capability of the patient to bring in the medication. If it is determined that the patient cannot supply the medication and it is not available to be dispensed from the pharmacy, a call will be placed to the ordering provider to discuss alternative options.

## 2021-06-16 NOTE — ED PROVIDER NOTES
HPI:  6/15/21, Time: 8:15 PM EDT         Sally Mcmillan is a 78 y.o. male presenting to the ED for weakness, beginning 1 day ago. The complaint has been persistent, moderate in severity, and worsened by nothing. Patient was seen here earlier today and was discharged. Patient has had increased weakness. Patient able to stand but unable to ambulate well. Patient per report able to sit down but unable to sit up afterwards needs assistance. Patient reporting no fever no chills he reports no vomiting or diarrhea reports no fever or chills. Patient was seen here earlier in the morning and was treated and released. Patient does have history of diabetes history of coronary disease history of hypertension. ROS:   Pertinent positives and negatives are stated within HPI, all other systems reviewed and are negative.  --------------------------------------------- PAST HISTORY ---------------------------------------------  Past Medical History:  has a past medical history of Arthritis, BPH (benign prostatic hypertrophy), CAD (coronary artery disease), Cervical spondylosis, Constipation, CTS (carpal tunnel syndrome), Diabetes mellitus ([facility] [address] DM type 2 (diabetes mellitus, type 2) ([facility] [address] Exogenous obesity, Glaucoma, History of EKG, Hypercholesterolemia, Hypertension, OA (osteoarthritis), Partial blindness, Polyp of colon, PONV (postoperative nausea and vomiting), Preoperative clearance, Vitamin B12 deficiency, Vitamin D deficiency, and Wears glasses. Past Surgical History:  has a past surgical history that includes Diagnostic Cardiac Cath Lab Procedure (1990 approximately); eye surgery (Bilateral); Total shoulder arthroplasty (Left, 2000); Cataract removal with implant (Bilateral); Total knee arthroplasty (Right, 11/30/2015); Colonoscopy (10/31/2013); and joint replacement (Right, 12/08/2015). Social History:  reports that he quit smoking about 40 years ago. His smoking use included cigars.  He started smoking about 67 years ago. He has a 120.00 pack-year smoking history. He quit smokeless tobacco use about 5 years ago. He reports that he does not drink alcohol and does not use drugs. Family History: family history includes Heart Disease in his mother; Heart Disease (age of onset: 52) in his father. The patients home medications have been reviewed. Allergies: Ace inhibitors; Anesthetics, amide; and Vicodin [hydrocodone-acetaminophen]    ---------------------------------------------------PHYSICAL EXAM--------------------------------------    Constitutional/General: Alert and oriented to person and place but not time, well appearing, non toxic in NAD  Head: Normocephalic and atraumatic  Eyes: PERRL, EOMI  Mouth: Oropharynx clear, handling secretions, no trismus  Neck: Supple, full ROM, non tender to palpation in the midline, no stridor, no crepitus, no meningeal signs  Pulmonary: Lungs clear to auscultation bilaterally, no wheezes, rales, or rhonchi. Not in respiratory distress  Cardiovascular:  Regular rate. Regular rhythm. No murmurs, gallops, or rubs. 2+ distal pulses  Chest: no chest wall tenderness  Abdomen: Soft. Non tender. Non distended. +BS. No rebound, guarding, or rigidity. No pulsatile masses appreciated. Musculoskeletal: Moves all extremities x 4. Warm and well perfused, no clubbing, cyanosis, or edema. Capillary refill <3 seconds  Skin: warm and dry. No rashes. Neurologic: A&O to person and place but not time CN 2-12 grossly intact, no focal deficits, symmetric strength 5/5 in the upper and lower extremities bilaterally  Psych: Normal Affect    -------------------------------------------------- RESULTS -------------------------------------------------  I have personally reviewed all laboratory and imaging results for this patient. Results are listed below.      LABS:  Results for orders placed or performed during the hospital encounter of 06/15/21   CBC auto differential   Result Value Ref Range    WBC 8.2 4.5 - 11.5 E9/L    RBC 2.82 (L) 3.80 - 5.80 E12/L    Hemoglobin 10.6 (L) 12.5 - 16.5 g/dL    Hematocrit 30.3 (L) 37.0 - 54.0 %    .4 (H) 80.0 - 99.9 fL    MCH 37.6 (H) 26.0 - 35.0 pg    MCHC 35.0 (H) 32.0 - 34.5 %    RDW 15.9 (H) 11.5 - 15.0 fL    Platelets 032 274 - 006 E9/L    MPV 9.9 7.0 - 12.0 fL    Neutrophils % 87.0 (H) 43.0 - 80.0 %    Lymphocytes % 8.7 (L) 20.0 - 42.0 %    Monocytes % 3.5 2.0 - 12.0 %    Eosinophils % 0.0 0.0 - 6.0 %    Basophils % 0.9 0.0 - 2.0 %    Neutrophils Absolute 7.13 1.80 - 7.30 E9/L    Lymphocytes Absolute 0.74 (L) 1.50 - 4.00 E9/L    Monocytes Absolute 0.33 0.10 - 0.95 E9/L    Eosinophils Absolute 0.00 (L) 0.05 - 0.50 E9/L    Basophils Absolute 0.07 0.00 - 0.20 E9/L    Anisocytosis 1+     Polychromasia 2+     Poikilocytes 1+     Tear Drop Cells 1+    Comprehensive Metabolic Panel   Result Value Ref Range    Sodium 133 132 - 146 mmol/L    Potassium 4.1 3.5 - 5.0 mmol/L    Chloride 95 (L) 98 - 107 mmol/L    CO2 26 22 - 29 mmol/L    Anion Gap 12 7 - 16 mmol/L    Glucose 178 (H) 74 - 99 mg/dL    BUN 21 6 - 23 mg/dL    CREATININE 1.4 (H) 0.7 - 1.2 mg/dL    GFR Non-African American 48 >=60 mL/min/1.73    GFR African American 59     Calcium 9.1 8.6 - 10.2 mg/dL    Total Protein 7.1 6.4 - 8.3 g/dL    Albumin 4.2 3.5 - 5.2 g/dL    Total Bilirubin 1.2 0.0 - 1.2 mg/dL    Alkaline Phosphatase 73 40 - 129 U/L    ALT 23 0 - 40 U/L    AST 73 (H) 0 - 39 U/L   Troponin   Result Value Ref Range    Troponin, High Sensitivity 1,671 (H) 0 - 11 ng/L   Lactic Acid, Plasma   Result Value Ref Range    Lactic Acid 2.3 (H) 0.5 - 2.2 mmol/L   EKG 12 Lead   Result Value Ref Range    Ventricular Rate 66 BPM    Atrial Rate 66 BPM    P-R Interval 190 ms    QRS Duration 146 ms    Q-T Interval 430 ms    QTc Calculation (Bazett) 450 ms    P Axis 76 degrees    R Axis -36 degrees    T Axis 62 degrees       RADIOLOGY:  Interpreted by Radiologist.  CT HEAD WO CONTRAST   Final Result   No acute intracranial abnormality. XR CHEST PORTABLE   Final Result   No acute process. Cardiomegaly               EKG:  This EKG is signed and interpreted by me. Rate: 66  Rhythm: Sinus  Interpretation: non-specific EKG, right bundle branch block  Comparison: stable as compared to patient's most recent EKG        ------------------------- NURSING NOTES AND VITALS REVIEWED ---------------------------   The nursing notes within the ED encounter and vital signs as below have been reviewed by myself. BP (!) 128/57   Pulse 69   Temp 97.5 °F (36.4 °C)   Resp 18   Ht 5' 11\" (1.803 m)   Wt 210 lb (95.3 kg)   SpO2 96%   BMI 29.29 kg/m²   Oxygen Saturation Interpretation: Normal    The patients available past medical records and past encounters were reviewed. ------------------------------ ED COURSE/MEDICAL DECISION MAKING----------------------  Medications   0.9 % sodium chloride infusion ( Intravenous New Bag 6/15/21 2115)             Medical Decision Making:   Patient presenting here because of weakness. Patient reportedly was treated and released. Patient diagnosed with UTI. Patient had fallen but did not strike his head today. Patient very weak per family. Patient having no chest pains no difficulty breathing labs noted reviewed today. Patient's EKG is unchanged. He does have an elevated troponin. Plan will be to admit to monitored bed     Re-Evaluations:             Re-evaluation. Patients symptoms show no change  Family at bedside made aware of findings and plan. Consultations:             Call was placed to PCP    Critical Care: This patient's ED course included: a personal history and physicial eaxmination    This patient has been closely monitored during their ED course. Counseling:    The emergency provider has spoken with the patient and discussed todays results, in addition to providing specific details for the plan of care and counseling regarding the diagnosis and prognosis. Questions are answered at this time and they are agreeable with the plan.       --------------------------------- IMPRESSION AND DISPOSITION ---------------------------------    IMPRESSION  1. Generalized weakness    2. Elevated troponin        DISPOSITION  Disposition: Admit to telemetry  Patient condition is fair        NOTE: This report was transcribed using voice recognition software.  Every effort was made to ensure accuracy; however, inadvertent computerized transcription errors may be present          Elsy Louis MD  06/15/21 2017       Elsy Louis MD  06/15/21 [address] [facility] Andria Cabrera MD  06/15/21 [id number]

## 2021-06-17 NOTE — PROGRESS NOTES
Occupational Therapy  OCCUPATIONAL THERAPY BEDSIDE TREATMENT NOTE   [address] [facility] [address] [facility]     Date:2021  Patient Name: Cindy Guerra  MRN: [id number]  : 1940  Room: [address] [place] [address]     Evaluating OT: Kamille Robledo, [address], OTR/L [id number]  Referring Homar Guerra MD  Specific Provider Orders: OT eval and treat 6/15  Recommended Adaptive Equipment: TBD      Diagnosis: weakness   Surgery: n/a  Pertinent Medical History: CAD, DM II, HTN, CTS, glaucoma   Precautions:  Fall Risk     Assessment of current deficits   [x] Functional mobility           [x]ADLs           [x] Strength                  [x]Cognition   [x] Functional transfers         [x] IADLs         [x] Safety Awareness   [x]Endurance   [] Fine Coordination                         [x] Balance      [] Vision/perception   [x]Sensation     []Gross Motor Coordination             [] ROM           [] Delirium                   [] Motor Control      OT PLAN OF CARE   OT POC based on physician orders, patient diagnosis and results of clinical assessment     Frequency/Duration: 2-3 days/wk for 2 weeks PRN   Specific OT Treatment to include:   * Instruction/training on adapted ADL techniques and AE recommendations to increase functional independence within precautions       * Training on energy conservation strategies, correct breathing pattern and techniques to improve independence/tolerance for self-care routine  * Functional transfer/mobility training/DME recommendations for increased independence, safety, and fall prevention  * Patient/Family education to increase follow through with safety techniques and functional independence  * Recommendation of environmental modifications for increased safety with functional transfers/mobility and ADLs  * Therapeutic exercise to improve motor endurance, ROM, and functional strength for ADLs/functional transfers  * Therapeutic activities to facilitate/challenge dynamic balance, stand tolerance for increased safety and independence with ADLs  * Neuro-muscular re-education: facilitation of righting/equilibrium reactions, midline orientation, scapular stability/mobility, normalization of muscle tone, and facilitation of volitional active controled movement     Home Living: Pt lives with wife in a 1 story home; bed/bath on main floor   Bathroom setup: walk in shower   Equipment owned: shower chair, BSC, ww  Prior Level of Function: IND with ADLs/IADLs; using cane for functional mobility   Driving: no     Pain Level: 0/10  Cognition: A&O: 3/4; Follows 1 step directions, with repetition and increased time             Memory:  fair              Sequencing:  fair              Problem solving:  fair              Judgement/safety:  fair      Functional Assessment:  AM-PAC Daily Activity Raw Score: 18/24    Initial Eval Status  Date: 6/16/21 Treatment Status  Date: 6/17/21 STGs=LTGs  Time Frame: 10-14 days   Feeding SUP (assist opening packages, pt able to self feed)   Setup; To completed self feeding while sitting up in the chair. IND   Grooming SBA (seated)  N/T;    Pt decline to wash face and brush teeth this date due to pt stating he will complete when he returns home. IND   UB Dressing Min A (simulated)   Min A;    Due to leon/doff of gown while sitting up in the chair. SBA   LB Dressing Min A (pt flexed at hips to doff/leon B socks with increased time)  Sup;    Pt sat up in the chair while performing leon/doff socks. SBA    Bathing Min A (simulated)  N/T;    Pt declined. SBA    Toileting Min A   SBA with use of w/w   SBA   Bed Mobility  Log roll: NT  Supine to sit: NT   Sit to supine: NT   Log roll: N/T  Supine to sit: NT   Sit to supine: NT;    Pt sitting up in the chair when arriving to room and pt agreed to sit up in the chair after treatment.      Log roll IND  Supine to sit: IND   Sit to supine: IND   Functional Transfers Sit to stand:SBA   Stand to sit:SBA  Commode: SBA Sit to stand: SBA  Stand to sit:SBA  Commode: SBA     With use of w/w and verbal prompting for proper body alignment. SUP   Functional Mobility SBA (using ww, to/from bathroom)  SBA with w/w     SUP   Balance Sitting: SBA  Standing: SBA Sitting: Mod I     Standing: SBA w/w       Activity Tolerance fair  Fair     Visual/  Perceptual Glasses: yes  yes                             UE ROM:        BUE: elbow WFL, shoulder flex grossly 110'  Strength:        RUE: grossly 3+/5       LUE: grossly 3+/5   Strength: B WFL  Fine Motor Coordination:  WFL                             Comments: Upon arrival, patient sitting up in the bedside chair with tray table in front of him. At end of session, patient sitting in chair with call light and phone within reach, all lines and tubes intact. Treatment: Patient performed ADLs, functional transfers and functional mobility during this session. Patient required verbal prompting for proper body alignment and use of w/w. Patient requiring increased time and verbal prompts to improve safety awareness during functional tasks.      -pt has made fair progress toward goals  -continue current POC    Time In: 0805       Time Out: 0830    Total treatment time: 25        Treatment Charges: Mins Units   OT Eval Low 33643      OT Eval Medium 83706       OT Eval High 90767       OT Re-Eval 01511       Ther Ex  99718       Manual Therapy 79389       Thera Activities 05183 12  1    ADL/Home Mgt 08711 13  1   Neuro Re-ed 53581       Group Therapy        Orthotic manage/training  73443       Non-Billable Time          [address] [facility], [facility], [facility], OTR/L [id number]

## 2021-06-18 NOTE — DISCHARGE SUMMARY
Physician Discharge Summary     Patient ID:  Adal Diamond  [id number]  78 y.o.  1940    Admit date: 6/15/2021    Discharge date and time: 6/17/2021  3:14 PM     Admission Diagnoses: Weakness [R53.1]    Discharge Diagnoses:   Acute cystitis  Patient Active Problem List   Diagnosis    Hypertension    Hypercholesterolemia    CAD (coronary artery disease)    Benign prostatic hyperplasia    CTS (carpal tunnel syndrome)    Cervical spondylosis    DM type 2 (diabetes mellitus, type 2) ([facility])    Exogenous obesity    OA (osteoarthritis)    Polyp of colon    Vitamin B12 deficiency    Vitamin D deficiency    Morbidly obese ([address]    Asthmatic bronchitis, mild intermittent, uncomplicated    Chest pain    Generalized weakness       Consults:    Procedures:     Hospital Course:   78-year-old  man admitted through emergency room due to dysuria unable to to ambulate and weakness  He refused to be seen by cardiology for elevated troponin  Continue the implications  Responded well to IV antibiotics and IV fluid replacement  Insisted upon going home  I spoke with the wife  Patient was discharged    Discharge Exam:  Alert oriented prior to discharge  No distress  Vital signs stable  Lungs were clear heart regular rate and rhythm  Extremities showed no edema  Neuro intact    Disposition: Stable at the time of discharge  Discharge to home  Patient Instructions:   Discharge Medication List as of 6/17/2021  2:41 PM      CONTINUE these medications which have CHANGED    Details   cefdinir (OMNICEF) 300 MG capsule Take 1 capsule by mouth 2 times daily for 10 days, Disp-20 capsule, R-0Normal         CONTINUE these medications which have NOT CHANGED    Details   pantoprazole (PROTONIX) 40 MG tablet TAKE 1 TABLET BY MOUTH EVERY DAY BEFORE BREAKFAST, Disp-90 tablet, R-0Normal      oxybutynin (DITROPAN XL) 15 MG extended release tablet Take 15 mg by mouth dailyHistorical Med      metFORMIN (GLUCOPHAGE-XR) 500 MG extended release tablet Take 500 mg by mouth 3 times daily (with meals) Historical Med      valsartan (DIOVAN) 80 MG tablet Take 1 tablet by mouth daily, Disp-90 tablet, R-1Normal      amLODIPine (NORVASC) 5 MG tablet TAKE 1 TABLET BY MOUTH EVERY DAY AT NIGHT, Disp-90 tablet, R-0Normal      metoprolol tartrate (LOPRESSOR) 50 MG tablet TAKE 1 TABLET BY MOUTH EVERY DAY, Disp-90 tablet, R-0Normal      atorvastatin (LIPITOR) 40 MG tablet TAKE 1 TABLET BY MOUTH EVERY DAY, Disp-90 tablet, R-0Normal      vitamin D (ERGOCALCIFEROL) 1.25 MG (22284 UT) CAPS capsule Take 1 capsule by mouth once a week for 12 doses, Disp-12 capsule, R-3PATIENT WOULD LIKE A REFILL. THANK YOUNormal      dapagliflozin (FARXIGA) 10 MG tablet TAKE 1 TABLET BY MOUTH EVERY DAY IN THE MORNING, Disp-90 tablet, R-1Normal      vitamin E 400 UNIT capsule Take 1 capsule by mouth daily, Disp-30 capsule, R-3Normal      mirabegron (MYRBETRIQ) 25 MG TB24 Take 1 tablet by mouth three times a week Mon-Wed-& Friday, Disp-35 tablet, R-0Pt was given samples during wife's OV on 1/21/2020. Lot # : [id number] Exp : December 2021Sample      tamsulosin (FLOMAX) 0.4 MG capsule TAKE 1 CAPSULE BY MOUTH EVERYDAY AT BEDTIME, R-3Historical Med      blood glucose test strips (FREESTYLE TEST STRIPS) strip Disp-100 each, R-2, NormalAs needed.     Diagnosis is E 11.9      FREESTYLE LANCETS MISC DAILY Starting Tue 6/11/2019, Disp-100 each, R-3, Normal      Continuous Blood Gluc Sensor (FREESTYLE MAYELA SENSOR SYSTEM) MISC 1 Units by Does not apply route 3 times daily, Disp-1 each, R-0, DAWNormal      Continuous Blood Gluc  (FREESTYLE MAYELA READER) WALTER 1 Units by Does not apply route 3 times daily e11.9, Disp-1 Device, R-0Normal      aspirin 81 MG EC tablet Take 81 mg by mouth nightlyHistorical Med      Coenzyme Q10 (CO Q-10) 100 MG CAPS Take 100 mg by mouth nightlyHistorical Med      Omega-3 Fatty Acids (FISH OIL) 300 MG CAPS Take 1 capsule by mouth Daily with supperHistorical Med      vitamin B-12 (CYANOCOBALAMIN) 1000 MCG tablet Take 1,000 mcg by mouth dailyHistorical Med      Ascorbic Acid (VITAMIN C) 500 MG tablet Take 500 mg by mouth dailyHistorical Med      latanoprost (XALATAN) 0.005 % ophthalmic solution Place 1 drop into the right eye nightly , R-4Historical Med           Activi as tolerated  Diet: Diabetic    Follow-up with PCP    Note that over 40 minutes was spent in preparing discharge papers, discussing discharge with patient, medication review, etc.    Signed:  Yanelis Sanz MD  6/18/2021  3:03 PM

## 2021-06-18 NOTE — CARE COORDINATION
follow up appt. Patient is waiting on return call. Patient's wife drives patient. Obtained and reviewed discharge summary and/or continuity of care documents    Care Transitions 24 Hour Call    Schedule Follow Up Appointment with PCP: Declined  Do you have any ongoing symptoms?: Yes  Patient-reported symptoms: Other (Comment: urinary issues)  Do you have a copy of your discharge instructions?: Yes  Do you have all of your prescriptions and are they filled?: Yes  Have you been contacted by a [address]?: No  Have you scheduled your follow up appointment?: No  Were you discharged with any Home Care or Post Acute Services: No  Do you feel like you have everything you need to keep you well at home?: Yes  Care Transitions Interventions    Registered Dietician: Santosh Burns with patient for initial care transition call post hospital discharge. Med review not completed; 1111F not entered. Patient declined med review, says he has all his meds set up. Patient instructed to take Cefdinir as directed until completely finished and not to stop unless directed by physician. Patient verbalized understanding. Patient presented to the emergency department on 6/15/21 for weakness. Patient states he is ambulating at home with a cane. Patient states he has his cream he rubs on his knees for pain. Patient states he is unable to have surgery on knee because anesthesia makes him loopy. Patient c/o urinary frequency. Patient denies hematuria. Patient states he is hydrating with water but not eating much due to lack of appetite. Patient encouraged to eat as food is fuel for the body. Patient states his wife just go home from the grocery store. Patient states he follows a low carb, low sodium diet. Patient states he did not check his blood sugar this morning because he was feeling a little shaky. Patient states he only checks blood sugar in the morning. Patient states he feels fair.   Patient verifies he

## 2021-06-21 PROBLEM — I21.4 NSTEMI (NON-ST ELEVATED MYOCARDIAL INFARCTION) (HCC): Status: ACTIVE | Noted: 2021-01-01

## 2021-06-21 NOTE — LETTER
[place] Department Medicaid  CERTIFICATION OF NECESSITY  FOR NON-EMERGENCY TRANSPORTATION   BY GROUND AMBULANCE      Individual Information   1. Name: Colby Gonzalez 2. [place] Medicaid Billing Number:    3. Address: [address] [place] [address]      Transportation Provider Information   4. Provider Name:    5. [place] Provider Number:  National Provider Identifier (NPI):      Certification  7. Criteria:  During transport, this individual requires:  [x] Medical treatment or continuous     supervision by an EMT. [] The administration or regulation of oxygen by another person. [] Supervised protective restraint. 8. Period Beginning Date: 2021   9. Length  [x] Not more than 5 day(s)  [] One Year     Additional Information Relevant to Certification   10. Comments or Explanations, If Necessary or Appropriate ; NSTEMI, CERVICAL SPONDYLOSIS, WEAKNESS, GENERALIZED WEAKNESS       Certifying Practitioner Information   11. Name of Practitioner: DR. Wolf Feldman MD   12. [place] Medicaid Provider Number, If Applicable:  [address] Provider Identifier (NPI):      Signature Information   14. Date of Signature: 2020 15. Name of Person Signing: Ambar Ramos   12. Signature and Professional Designation: [facility]     [place] [address]  Rev. 2015    [address]  [address] Encounter Date/Time: 2021 [address] Account: [de-identified]    MRN: [id number]    Patient: Colby Gonzalez    Contact Serial #: [id number]      ENCOUNTER          Patient Class: I Private Enc?   No Unit  BDAvram [facility] [id number]   Hospital Service: Intermediate   Encounter DX: NSTEMI (non-ST elevated *   ADM Provider: Claudell Cancer, MD   Procedure:     ATT Provider: Claudell Cancer, MD   REF Provider:        Admission DX: NSTEMI (non-ST elevated myocardial infarction) [facility]) and DX codes: I21.4      PATIENT                 Name: Colby Gonzalez : 1940 (80 yrs)   Address: [address] [place] [address] [address]UE Sex: Male   Marbin Caldwell [facility] [address]         Marital Status:    Employer: RETIRED         Ep[employer]: [employer]   Primary Care Provider: Rinku Daigle MD         Primary Phone: [phone]   EMERGENCY CONTACT   Contact Name Legal Guardian? Relationship to Patient Home Phone Work Phone   1. Allyson Koch  2. Donna Finders      Spouse  Child [phone]                 GUARANTOR            Guarantor: Saeid Cast     : 1940   Address: [address] [place] [address] Sex: Male   [place] [address]     Relation to Patient: Self       Home Phone: [phone]   Guarantor ID: [id number]       Work Phone:     Guarantor Employer: UNKNOWN         Status: UNKNOWN      COVERAGE  PRIMARY INSURANCE   Payor: MEDICARE Plan: MEDICARE PART A AND B   Payor Address: [facility] [address],  [address]       Group Number:   Insurance Type: INDEMNITY   Subscriber Name: Domenica Marshall : 1940   Subscriber ID: [id number] Herminia. Rel. to Sub: Self   SECONDARY INSURANCE   Payor: [employer] Plan: [employer] [address]   Payor [facility]  [facility] [address]          Group Number: [id number] Insurance Type: INDEMNITY   Subscriber Name: Domenica Marshall : 1940   Subscriber ID: [id number] Pat.  Rel. to Sub: SELF

## 2021-06-21 NOTE — CONSULTS
Inpatient Cardiology Consultation      Reason for Consult:  NSTEMI    Consulting Physician: Dr Ronni Flynn    Requesting Physician:  Dr Niki Palacios    Date of Consultation: 6/21/2021    HISTORY OF PRESENT ILLNESS: 79 yo  male previously known to Dr Renay Gallagher  last by Dr Ronni Flynn 11/2020 as inpatient    PMH: PMH: HTN, HLD, T2DM, CKD, positive stress 1994  s/p PTCA in 1994, non ischemic stress 6/2020, mild AS on TTE 11/2020 (EF 60-65%), and ex smoker quit in 1981. Was awoke around 04/0500 this am with mid-sternal CP with SOB. Non radiating. Took 4 baby ASA at home. SE-ED 6/21/2021 BP upon arrival 134/75 HR 90's and SR. EKG SR with RBBB. Na 139, K+ 4.2, Bun/Cr 25/1.3 ( 37/1.6 on 6/17/2021), p-BNP 4623, troponin 781 ( 1903 6/16/20210, Alk phos 159, ALT 54, WBC 4.4, Hgb 9.1. Nitro SL x 1 --> complete resolution of CP and it has not returned. Denies any SOB. Please note: past medical records were reviewed per electronic medical record (EMR) - see detailed reports under Past Medical/ Surgical History. Past Medical History:    1. ACEI have causes LIZZ in past  2. 120 pack years quit in 1981  3. Unga  4. HTN  5. HLD  6. Anemia  7. CKD  8. Chronic back and neck pain  9. cRBBB  10. Bilateral carpal tunnel syndrome  11. Vitamin B12 deficiency   12. Macular Degeneration with partial blindness L eye  13. BPH  14. 11/4/1994 [facility] Dr Jacinto Solorio EF 55%. Severe inferobasilar hypokinesis.  RCA. 70% stenosis in very large OM2, Diffuse moderate disease throughout proximal LAD. Diffuse moderate disease of 1st and 2nd diagonal.   15. 11/7/1994 [facility] Dr Shahla Landon: PTCA of LCx--> reduction to 25%s stenosis  16. 2006 Nonischemic stress, NWM.  17. 6/2020 Lexiscan MPS: Non ischemic  18. 11/2020 TTE Dr Ronni Flynn: EF 60-65%. Mild AS HANNAH 1.9. Moderately dilated LA. 19. Ambulates with cane  20. 12/2020 p-  21. Completed [address]  22.  [facility] 6/15/2021 reportedly for weakness although when asked patient initially said SOB when I asked him about the SOB he said \"what are you talking about I have an infection. \" Denies any CP, SOB,, dizziness, weakness. Does report some urinary \"burning\" Patient was disinterested in answering questions as he was eating his breakfast.  + UTI, Hgb 10.6, Bun/Cr 23/1.2-->19/1.2, troponin 1671-->1903  BP upon arrival 128/57 current /46 remains SR in 60's. Placed on Rocephin, on NSS at 100/hr. refused to be seen by cardiology and was discharged home on PO ATB 6/16/2021      Medications Prior to admit:  Prior to Admission medications    Medication Sig Start Date End Date Taking?  Authorizing Provider   metFORMIN (GLUCOPHAGE-XR) 500 MG extended release tablet Take 1 tablet by mouth 3 times daily (with meals) 6/18/21  Yes Rosa Gibbs MD   cefdinir (OMNICEF) 300 MG capsule Take 1 capsule by mouth 2 times daily for 10 days 6/17/21 6/27/21 Yes Rosa Gibbs MD   pantoprazole (PROTONIX) 40 MG tablet TAKE 1 TABLET BY MOUTH EVERY DAY BEFORE BREAKFAST 6/15/21  Yes Rosa Gibbs MD   oxybutynin (DITROPAN XL) 15 MG extended release tablet Take 15 mg by mouth daily   Yes Historical Provider, MD   valsartan (DIOVAN) 80 MG tablet Take 1 tablet by mouth daily 3/15/21  Yes Rosa Gibbs MD   amLODIPine (NORVASC) 5 MG tablet TAKE 1 TABLET BY MOUTH EVERY DAY AT NIGHT 3/8/21  Yes Rosa Gibbs MD   metoprolol tartrate (LOPRESSOR) 50 MG tablet TAKE 1 TABLET BY MOUTH EVERY DAY 3/8/21  Yes Rosa Gibbs MD   atorvastatin (LIPITOR) 40 MG tablet TAKE 1 TABLET BY MOUTH EVERY DAY 3/8/21  Yes Rosa Gibbs MD   dapagliflozin (FARXIGA) 10 MG tablet TAKE 1 TABLET BY MOUTH EVERY DAY IN THE MORNING 12/4/20  Yes oRsa Gibbs MD   vitamin E 400 UNIT capsule Take 1 capsule by mouth daily 6/14/20  Yes Rosa Gibbs MD   mirabegron (MYRBETRIQ) 25 MG TB24 Take 1 tablet by mouth three times a week Mon-Wed-& Friday 1/27/20  Yes Rosa Gibbs MD   tamsulosin (FLOMAX) 0.4 MG capsule TAKE 1 CAPSULE BY MOUTH EVERYDAY AT BEDTIME 10/8/19  Yes Historical Provider, MD   blood glucose test strips (FREESTYLE TEST STRIPS) strip Indications: freestyle test strips DX: E11.9 As needed.     Diagnosis is E 11.9 6/11/19  Yes Woodford Denver, MD   FREESTYLE LANCETS MISC 1 each by Does not apply route daily Indications: in AM DX: E11.9 6/11/19  Yes Woodford Denver, MD   Continuous Blood Gluc Sensor ([facility]) MISC 1 Units by Does not apply route 3 times daily 9/6/18  Yes Woodford Denver, MD   Continuous Blood Gluc  (FREESTYLE MAYELA READER) WALTER 1 Units by Does not apply route 3 times daily e11.9 9/6/18  Yes Woodford Denver, MD   aspirin 81 MG EC tablet Take 81 mg by mouth nightly   Yes Historical Provider, MD   Coenzyme Q10 (CO Q-10) 100 MG CAPS Take 100 mg by mouth nightly   Yes Historical Provider, MD   Omega-3 Fatty Acids (FISH OIL) 300 MG CAPS Take 1 capsule by mouth Daily with supper   Yes Historical Provider, MD   vitamin B-12 (CYANOCOBALAMIN) 1000 MCG tablet Take 1,000 mcg by mouth daily   Yes Historical Provider, MD   Ascorbic Acid (VITAMIN C) 500 MG tablet Take 500 mg by mouth daily   Yes Historical Provider, MD   latanoprost (XALATAN) 0.005 % ophthalmic solution Place 1 drop into the right eye nightly  9/23/15  Yes Historical Provider, MD   vitamin D (ERGOCALCIFEROL) 1.25 MG (81114 UT) CAPS capsule Take 1 capsule by mouth once a week for 12 doses 12/7/20 6/15/21  Woodford Denver, MD       Current Medications:    Current Facility-Administered Medications: heparin (porcine) injection 4,000 Units, 4,000 Units, Intravenous, PRN  heparin (porcine) injection 2,000 Units, 2,000 Units, Intravenous, PRN  heparin 25,000 units in dextrose 5% 250 mL (premix) infusion, 5-30 Units/kg/hr, Intravenous, Continuous  aspirin chewable tablet 324 mg, 324 mg, Oral, Once  metoprolol tartrate (LOPRESSOR) tablet 25 mg, 25 mg, Oral, BID  perflutren lipid microspheres (DEFINITY) injection 1.65 mg, 1.5 mL, Intravenous, ONCE PRN  [START ON 6/22/2021] aspirin EC tablet 81 mg, 81 mg, Oral, Daily  0.9 % sodium chloride infusion, , Intravenous, Continuous  amLODIPine (NORVASC) tablet 5 mg, 5 mg, Oral, Daily  nitroglycerin (NITRO-BID) 2 % ointment 0.5 inch, 0.5 inch, Topical, 4 times per day    Allergies:  Ace inhibitors; Anesthetics, amide; and Vicodin [hydrocodone-acetaminophen]   ACEI causes LIZZ    Social History:    120 pack years quit in 0291  Denies ETOH/Illicit Drugs  Activity: Lives with wife in 1 story home with basement. Ambulates with cane. Does not drive. Code Status: Full Code      Family History: Noncontributory secondary to age      REVIEW OF SYSTEMS:     · Constitutional: Denies fatigue, fevers, chills or night sweats  · Eyes: Denies visual changes or drainage  · ENT: Denies headaches or hearing loss. No mouth sores or sore throat. No epistaxis   · Cardiovascular: + CP mid-sternal awoken from sleep with SOB. Denies palpitations. No lower extremity swelling. · Respiratory: + SOB with CP. Denies AVERY, cough, orthopnea or PND. No hemoptysis   · Gastrointestinal: Denies hematemesis or anorexia. No hematochezia or melena    · Genitourinary: Denies urgency, dysuria or hematuria. · Musculoskeletal: Denies gait disturbance, weakness or joint complaints  · Integumentary: Denies rash, hives or pruritis   · Neurological: Denies dizziness, headaches or seizures. No numbness or tingling  · Psychiatric: Denies anxiety or depression. · Endocrine: Denies temperature intolerance. No recent weight change. .  · Hematologic/Lymphatic: Denies abnormal bruising or bleeding. No swollen lymph nodes    PHYSICAL EXAM:   /78   Pulse 91   Temp 97 °F (36.1 °C)   Resp 21   Ht 5' 9\" (1.753 m)   Wt 225 lb (102.1 kg)   SpO2 94%   BMI 33.23 kg/m²   CONST:  Well developed, obese elderly  male who appears of stated age. Awake, alert and cooperative. No apparent distress.    HEENT:   Head- Normocephalic, atraumatic   Eyes- Conjunctivae pink, anicteric  Throat- Oral mucosa pink and moist  Neck-  No stridor, trachea midline, no jugular venous distention. No carotid bruit. CHEST: Chest symmetrical and non-tender to palpation. No accessory muscle use or intercostal retractions  RESPIRATORY: Lung sounds - clear throughout fields   CARDIOVASCULAR:     Heart Ausculation- Regular rate and rhythm, no murmur heard. PV: Trace bilateral lower extremity edema. No varicosities. Pedal pulses palpable, no clubbing or cyanosis. ABDOMEN: Soft, non-tender to light palpation. Bowel sounds present. No palpable masses; no abdominal bruit  MS: Good muscle strength and tone. No atrophy or abnormal movements. : Deferred  SKIN: Warm and dry no statis dermatitis or ulcers   NEURO / PSYCH: Oriented to person, place and time. Speech clear and appropriate. Follows all commands. Pleasant affect     DATA:    ECG as per Dr Rascon's interpretation  Tele strips: SR    Diagnostic:  6/21/2021 CXR: Cardiomegaly.  There are no findings of failure or pneumonia.  Small chronic scar seen within the left lung base      Labs:   CBC:   Recent Labs     06/21/21  0538   WBC 4.4*   HGB 9.1*   HCT 27.1*        BMP:   Recent Labs     06/21/21  0538      K 4.3   CO2 25   BUN 25*   CREATININE 1.3*   LABGLOM 53   CALCIUM 9.5     Mag:   Recent Labs     06/21/21  0538   MG 1.7     HgA1c:   Lab Results   Component Value Date    LABA1C 6.7 (H) 03/11/2021       proBNP:   Recent Labs     06/21/21  0538   PROBNP 4,623*     PT/INR:   Recent Labs     06/21/21  0538   PROTIME 12.4   INR 1.1     APTT:  Recent Labs     06/21/21  0538 06/21/21  0744   APTT 28.3 113.4*     CARDIAC ENZYMES:  Recent Labs     06/21/21  0538   TROPHS 781*     FASTING LIPID PANEL:  Lab Results   Component Value Date    CHOL 144 03/11/2021    HDL 36 03/11/2021    LDLCALC 71 03/11/2021    TRIG 186 03/11/2021     LIVER PROFILE:  Recent Labs     06/21/21  0538   AST 19   ALT 54*   LABALBU 3.7     Assessment/PLAN  ACS/NSTEMI  AUC 8-3  Plan for LHC on 6/22/2021  Heparin bolus/gtt  ASA, statin, BB, Norvasc, and Nitro paste  NSS at 50/hr  NPO after midnight  Discussed with Dr Mariia Marshall    Electronically signed by [facility].  KATHRYN Meneses on 6/21/2021 at 9:49 AM

## 2021-06-21 NOTE — ED PROVIDER NOTES
47-year-old male with history of hypertension, diabetes, and hypercholesterolemia presenting for chest pain. He describes the pain as a tightness in middle of his chest that woke him up out of sleep. He has associated shortness of breath as well. Symptoms have been persistent, worsened by nothing and relieved by nothing. He states he did take aspirin at home. He denies any fever, chills, nausea, diaphoresis, cough, and congestion. Chest Pain  Pain location:  Substernal area  Pain quality: tightness    Pain radiates to:  Does not radiate  Timing:  Constant  Progression:  Unchanged  Associated symptoms: shortness of breath    Associated symptoms: no abdominal pain, no back pain, no cough, no fever, no headache and no nausea         Review of Systems   Constitutional: Negative for chills and fever. HENT: Negative for congestion and sore throat. Eyes: Negative for photophobia and visual disturbance. Respiratory: Positive for shortness of breath. Negative for cough. Cardiovascular: Positive for chest pain. Gastrointestinal: Negative for abdominal pain and nausea. Genitourinary: Negative for dysuria and flank pain. Musculoskeletal: Negative for back pain and myalgias. Neurological: Negative for headaches. Physical Exam  Constitutional:       Comments: Patient lying back in bed, tachypneic   HENT:      Head: Normocephalic and atraumatic. Nose: Nose normal.   Eyes:      Extraocular Movements: Extraocular movements intact. Conjunctiva/sclera: Conjunctivae normal.   Cardiovascular:      Rate and Rhythm: Normal rate and regular rhythm. Pulmonary:      Comments: Crackles bilateral bases  Tachypneic  Abdominal:      General: Abdomen is flat. There is no distension. Palpations: Abdomen is soft. Tenderness: There is no abdominal tenderness. Musculoskeletal:      Cervical back: Normal range of motion and neck supple.       Comments: 1+ pitting edema bilateral lower shoulder arthroplasty (Left, 2000); Cataract removal with implant (Bilateral); Total knee arthroplasty (Right, 11/30/2015); Colonoscopy (10/31/2013); and joint replacement (Right, 12/08/2015). Social History:  reports that he quit smoking about 40 years ago. His smoking use included cigars. He started smoking about 67 years ago. He has a 120.00 pack-year smoking history. He quit smokeless tobacco use about 5 years ago. He reports that he does not drink alcohol and does not use drugs. Family History: family history includes Heart Disease in his mother; Heart Disease (age of onset: 52) in his father. The patients home medications have been reviewed. Allergies: Ace inhibitors;  Anesthetics, amide; and Vicodin [hydrocodone-acetaminophen]    -------------------------------------------------- RESULTS -------------------------------------------------    LABS:  Results for orders placed or performed during the hospital encounter of 06/21/21   CBC Auto Differential   Result Value Ref Range    WBC 4.4 (L) 4.5 - 11.5 E9/L    RBC 2.45 (L) 3.80 - 5.80 E12/L    Hemoglobin 9.1 (L) 12.5 - 16.5 g/dL    Hematocrit 27.1 (L) 37.0 - 54.0 %    .6 (H) 80.0 - 99.9 fL    MCH 37.1 (H) 26.0 - 35.0 pg    MCHC 33.6 32.0 - 34.5 %    RDW 15.1 (H) 11.5 - 15.0 fL    Platelets 862 747 - 116 E9/L    MPV 10.4 7.0 - 12.0 fL    Neutrophils % 67.8 43.0 - 80.0 %    Lymphocytes % 25.2 20.0 - 42.0 %    Monocytes % 6.1 2.0 - 12.0 %    Eosinophils % 0.9 0.0 - 6.0 %    Basophils % 0.5 0.0 - 2.0 %    Neutrophils Absolute 2.99 1.80 - 7.30 E9/L    Lymphocytes Absolute 1.10 (L) 1.50 - 4.00 E9/L    Monocytes Absolute 0.26 0.10 - 0.95 E9/L    Eosinophils Absolute 0.04 (L) 0.05 - 0.50 E9/L    Basophils Absolute 0.00 0.00 - 0.20 E9/L    Anisocytosis 1+     Polychromasia 1+     Hypochromia 1+     Poikilocytes 1+     Ovalocytes 1+     Tear Drop Cells 1+    Comprehensive Metabolic Panel   Result Value Ref Range    Sodium 139 132 - 146 mmol/L Potassium 4.3 3.5 - 5.0 mmol/L    Chloride 101 98 - 107 mmol/L    CO2 25 22 - 29 mmol/L    Anion Gap 13 7 - 16 mmol/L    Glucose 186 (H) 74 - 99 mg/dL    BUN 25 (H) 6 - 23 mg/dL    CREATININE 1.3 (H) 0.7 - 1.2 mg/dL    GFR Non-African American 53 >=60 mL/min/1.73    GFR African American >60     Calcium 9.5 8.6 - 10.2 mg/dL    Total Protein 6.8 6.4 - 8.3 g/dL    Albumin 3.7 3.5 - 5.2 g/dL    Total Bilirubin 0.6 0.0 - 1.2 mg/dL    Alkaline Phosphatase 159 (H) 40 - 129 U/L    ALT 54 (H) 0 - 40 U/L    AST 19 0 - 39 U/L   Magnesium   Result Value Ref Range    Magnesium 1.7 1.6 - 2.6 mg/dL   Troponin   Result Value Ref Range    Troponin, High Sensitivity 781 (H) 0 - 11 ng/L   Brain Natriuretic Peptide   Result Value Ref Range    Pro-BNP 4,623 (H) 0 - 450 pg/mL   Protime-INR   Result Value Ref Range    Protime 12.4 9.3 - 12.4 sec    INR 1.1    APTT   Result Value Ref Range    aPTT 28.3 24.5 - 35.1 sec   Lactic Acid, Plasma   Result Value Ref Range    Lactic Acid 2.4 (H) 0.5 - 2.2 mmol/L   APTT   Result Value Ref Range    aPTT 113.4 (H) 24.5 - 35.1 sec   CK-MB   Result Value Ref Range    CK-MB 2.2 0.0 - 7.7 ng/mL   TSH without Reflex   Result Value Ref Range    TSH 2.600 0.270 - 4.200 uIU/mL   T4, Free   Result Value Ref Range    T4 Free 1.45 0.93 - 1.70 ng/dL   APTT   Result Value Ref Range    aPTT 40.4 (H) 24.5 - 35.1 sec   APTT   Result Value Ref Range    aPTT 40.8 (H) 24.5 - 35.1 sec   POCT Glucose   Result Value Ref Range    Meter Glucose 187 (H) 74 - 99 mg/dL   POCT Glucose   Result Value Ref Range    Meter Glucose 197 (H) 74 - 99 mg/dL   EKG 12 Lead   Result Value Ref Range    Ventricular Rate 98 BPM    Atrial Rate 98 BPM    QRS Duration 150 ms    Q-T Interval 384 ms    QTc Calculation (Bazett) 490 ms    R Axis -63 degrees    T Axis 41 degrees   EKG 12 Lead   Result Value Ref Range    Ventricular Rate 91 BPM    Atrial Rate 91 BPM    P-R Interval 190 ms    QRS Duration 138 ms    Q-T Interval 382 ms    QTc Calculation (Bazett) 469 ms    P Axis 66 degrees    R Axis -30 degrees    T Axis 81 degrees   EKG 12 Lead   Result Value Ref Range    Ventricular Rate 85 BPM    Atrial Rate 85 BPM    P-R Interval 202 ms    QRS Duration 136 ms    Q-T Interval 410 ms    QTc Calculation (Bazett) 487 ms    P Axis 63 degrees    R Axis -26 degrees    T Axis 95 degrees       RADIOLOGY:  XR CHEST PORTABLE   Final Result   1. Cardiomegaly. There are no findings of failure or pneumonia. 2. Small chronic scar seen within the left lung base. EKG:  This EKG is signed and interpreted by me. Rate: 98  Rhythm: Sinus  Interpretation: right bundle branch block, left axis deviation  Comparison: stable as compared to patient's most recent EKG      ------------------------- NURSING NOTES AND VITALS REVIEWED ---------------------------  Date / Time Roomed:  6/21/2021  5:16 AM  ED Bed Assignment:  [id number]    The nursing notes within the ED encounter and vital signs as below have been reviewed. Patient Vitals for the past 24 hrs:   BP Temp Temp src Pulse Resp SpO2 Height Weight   06/21/21 2000 119/60 98.2 °F (36.8 °C) Oral 83 20 96 % -- --   06/21/21 1403 112/68 97.3 °F (36.3 °C) -- 75 -- 96 % -- --   06/21/21 0845 119/78 97 °F (36.1 °C) -- 91 21 94 % -- --   06/21/21 0730 124/80 97 °F (36.1 °C) -- 103 22 97 % -- --   06/21/21 0518 134/75 97 °F (36.1 °C) -- 96 20 97 % 5' 9\" (1.753 m) 225 lb (102.1 kg)       Oxygen Saturation Interpretation: Normal    ------------------------------------------ PROGRESS NOTES ------------------------------------------      Counseling:  I have spoken with the patient and discussed todays results, in addition to providing specific details for the plan of care and counseling regarding the diagnosis and prognosis.   Their questions are answered at this time and they are agreeable with the plan of admission.    --------------------------------- ADDITIONAL PROVIDER NOTES ---------------------------------  Consultations: This patient's ED course included: a personal history and physicial examination, re-evaluation prior to disposition, multiple bedside re-evaluations, IV medications, cardiac monitoring and continuous pulse oximetry    This patient has remained hemodynamically stable during their ED course. Diagnosis:  1. Chest pain, unspecified type        Disposition:  Patient's disposition: Admit to telemetry  Patient's condition is stable.          Ela Santos MD  Resident  06/21/21 [id number]

## 2021-06-21 NOTE — ED NOTES
Bed: 22  Expected date:   Expected time:   Means of arrival:   Comments:  Nilo Paul RN  06/21/21 [id number]

## 2021-06-21 NOTE — H&P
Burak Partida MD FACP   History and Physical      CHIEF COMPLAINT: Chest pain      HISTORY OF PRESENT ILLNESS:    75-year-old  woman in the emergency room at [facility] earlier this morning  The son and a registered nurse were at bedside  Data reviewed in detail  Spoke with the ER physician in person  Patient with known history of coronary artery disease had an angioplasty many years ago for the LAD  Recently hospitalized with acute cystitis  Refused to see cardiologist on that admission  Now presents with onset of anterior chest discomfort described as a heaviness that woke him up at 5 AM  Nitroglycerin sublingual apparently relieved the pain in the ER  Troponins BNP were markedly elevated along with EKG changes suggestive of acute non-ST elevation myocardial infarction  He also reports shortness of breath even at rest    Past Medical History:    Past Medical History:   Diagnosis Date    Arthritis     bilateral knee    BPH (benign prostatic hypertrophy) 8/4/2016    CAD (coronary artery disease)     Cervical spondylosis 8/4/2016    Constipation     prune juice in am    CTS (carpal tunnel syndrome) 8/4/2016    Diabetes mellitus ([facility] [address]     DM type 2 (diabetes mellitus, type 2) ([facility] [address] 8/4/2016    Exogenous obesity 8/4/2016    Glaucoma     bilateral    History of EKG 10/14/2015 per Dr Jodi Bethea on chart.     Hypercholesterolemia     Hypertension     OA (osteoarthritis) 8/4/2016    Partial blindness     left eye    Polyp of colon 8/4/2016    PONV (postoperative nausea and vomiting)     Preoperative clearance 11/12/2015    per Dr Jodi Bethea on chart; for right tjak Dr Tiffanie Barth Vitamin B12 deficiency 8/4/2016    Vitamin D deficiency 8/4/2016    Wears glasses        Past Surgical History:    Past Surgical History:   Procedure Laterality Date    CATARACT REMOVAL WITH IMPLANT Bilateral     COLONOSCOPY  10/31/2013    DIAGNOSTIC CARDIAC CATH LAB PROCEDURE  1990 approximately    ? stent    EYE SURGERY Bilateral     cataract    JOINT REPLACEMENT Right 12/08/2015    SHOULDER ARTHROPLASTY Left 2000    TOTAL KNEE ARTHROPLASTY Right 11/30/2015       Medications Prior to Admission:    Medications Prior to Admission: metFORMIN (GLUCOPHAGE-XR) 500 MG extended release tablet, Take 1 tablet by mouth 3 times daily (with meals)  cefdinir (OMNICEF) 300 MG capsule, Take 1 capsule by mouth 2 times daily for 10 days  pantoprazole (PROTONIX) 40 MG tablet, TAKE 1 TABLET BY MOUTH EVERY DAY BEFORE BREAKFAST  oxybutynin (DITROPAN XL) 15 MG extended release tablet, Take 15 mg by mouth daily  valsartan (DIOVAN) 80 MG tablet, Take 1 tablet by mouth daily  amLODIPine (NORVASC) 5 MG tablet, TAKE 1 TABLET BY MOUTH EVERY DAY AT NIGHT  metoprolol tartrate (LOPRESSOR) 50 MG tablet, TAKE 1 TABLET BY MOUTH EVERY DAY  atorvastatin (LIPITOR) 40 MG tablet, TAKE 1 TABLET BY MOUTH EVERY DAY  dapagliflozin (FARXIGA) 10 MG tablet, TAKE 1 TABLET BY MOUTH EVERY DAY IN THE MORNING  vitamin E 400 UNIT capsule, Take 1 capsule by mouth daily  mirabegron (MYRBETRIQ) 25 MG TB24, Take 1 tablet by mouth three times a week Mon-Wed-& Friday  tamsulosin (FLOMAX) 0.4 MG capsule, TAKE 1 CAPSULE BY MOUTH EVERYDAY AT BEDTIME  blood glucose test strips (FREESTYLE TEST STRIPS) strip, Indications: freestyle test strips DX: E11.9 As needed.     Diagnosis is E 11.9  FREESTYLE LANCETS MISC, 1 each by Does not apply route daily Indications: in AM DX: E11.9  Continuous Blood Gluc Sensor ([address] [place] [address]) MISC, 1 Units by Does not apply route 3 times daily  Continuous Blood Gluc  (FREESTYLE MAYELA READER) WALTER, 1 Units by Does not apply route 3 times daily e11.9  aspirin 81 MG EC tablet, Take 81 mg by mouth nightly  Coenzyme Q10 (CO Q-10) 100 MG CAPS, Take 100 mg by mouth nightly  Omega-3 Fatty Acids (FISH OIL) 300 MG CAPS, Take 1 capsule by mouth Daily with supper  vitamin B-12 (CYANOCOBALAMIN) 1000 MCG tablet, Take 1,000 mcg by mouth daily  Ascorbic Acid (VITAMIN C) 500 MG tablet, Take 500 mg by mouth daily  latanoprost (XALATAN) 0.005 % ophthalmic solution, Place 1 drop into the right eye nightly   vitamin D (ERGOCALCIFEROL) 1.25 MG (86414 UT) CAPS capsule, Take 1 capsule by mouth once a week for 12 doses    Allergies:    Ace inhibitors; Anesthetics, amide; and Vicodin [hydrocodone-acetaminophen]    Social History:    reports that he quit smoking about 40 years ago. His smoking use included cigars. He started smoking about 67 years ago. He has a 120.00 pack-year smoking history. He quit smokeless tobacco use about 5 years ago. He reports that he does not drink alcohol and does not use drugs. Family History:   family history includes Heart Disease in his mother; Heart Disease (age of onset: 52) in his father. REVIEW OF SYSTEMS:  As above in the HPI, otherwise negative    PHYSICAL EXAM:    Vitals:  /78   Pulse 91   Temp 97 °F (36.1 °C)   Resp 21   Ht 5' 9\" (1.753 m)   Wt 225 lb (102.1 kg)   SpO2 94%   BMI 33.23 kg/m²     General:  Awake, alert, oriented X 3. Well developed, well nourished, well groomed. Morbidly obese  In mild to moderate distress  HEENT:  Normocephalic, atraumatic. Pupils equal, round, reactive to light. No scleral icterus. No conjunctival injection. .  No nasal discharge. Neck:  Supple  Heart:  RRR, no murmurs, gallops, rubs  Lungs: Scattered rails in the posterior lung fields noted although chest x-ray reveals no pulmonary edema  Abdomen:   Bowel sounds present, soft, nontender, no masses, no organomegaly, no peritoneal signs  Extremities:  No clubbing, cyanosis, or edema  Skin:  Warm and dry, no open lesions or rash  Neuro:  Cranial nerves 2-12 intact, no focal deficits  Breast: deferred  Rectal: deferred  Genitalia:  deferred    LABS: Data reviewed in detail      ASSESSMENT:      Active Problems:    NSTEMI (non-ST elevated myocardial infarction) ([facility] [address]  Acute CHF likely diastolic based on ischemic event  Patient Active Problem List   Diagnosis    Hypertension    Hypercholesterolemia    CAD (coronary artery disease)    Benign prostatic hyperplasia    CTS (carpal tunnel syndrome)    Cervical spondylosis    DM type 2 (diabetes mellitus, type 2) (HCC)    Exogenous obesity    OA (osteoarthritis)    Polyp of colon    Vitamin B12 deficiency    Vitamin D deficiency    Morbidly obese (HCC)    Asthmatic bronchitis, mild intermittent, uncomplicated    Chest pain    Generalized weakness    NSTEMI (non-ST elevated myocardial infarction) ([facility] [address]           PLAN:  Heparin infusion  Nitroglycerin  Aspirin/beta-blockers/statins  Cardiology consult  Reviewed in detail with the son at bedside    Garrett Rodriguez MD  12:58 PM  6/21/2021

## 2021-06-22 NOTE — CARE COORDINATION
SOCIAL WORK/DISCHARGE PLANNING;  Met with pt in room. He is alert but seemed slightly confused. Pt is a readmission with dx of NSTEMI. He was discharged on 06/17/21 and returned home at that time with his wife. Pt states his wife goes to chemo but states his d-I-l is visiting but she is not currently in room. Per prior CM note pt declined HHC at that time. I went back in room and d-I-l Darroll Like is present. Discussed care coordination with her. She reported that she thinks HHC would be good for pt but would like to wait for results of planned heart cath which is this afternoon. She and pt reported, pt's wife receives [place] [id number]  services through [place] [address] and they would be receptive to using same agency. Made tentative referral but will need HHC orders prior to discharge. Pt's pcp is Dr. Shabbir Donaldson and rx is [facility] [address].    [facility]  [phone]

## 2021-06-22 NOTE — PROCEDURES
[address]                  [address] [facility]  [facility]                            CARDIAC CATHETERIZATION    PATIENT NAME: Meryl Renee                 :        1940  MED REC NO:   [id number]                            ROOM:       [id number]  ACCOUNT NO:   [de-identified]                           ADMIT DATE: 2021  PROVIDER:     Jl Mcgraw MD    DATE OF PROCEDURE:  2021    PROCEDURES:  1. Coronary angiography. 2.  Conscious sedation using Versed and fentanyl. Indication #3, score of 8. INDICATION FOR PROCEDURE:  The patient is an 75-year-old male with a  history of diabetes and CAD in the past, presented to the hospital with  non-ST-elevation MI. The patient was brought to the cath lab for  further evaluation. DESCRIPTION OF PROCEDURE:  After the appropriate informed consent, the  right wrist area was prepped and draped in the usual sterile fashion. A  timeout was called. The right wrist area was locally anesthetized with  2 mL of 2% lidocaine. A 6-Bangladeshi introducer sheath was used to  cannulate the right radial artery. A 6-Bangladeshi JL-3.5 and 6-Bangladeshi JR4  catheter were used for coronary angiography in multiple projections. ANGIOGRAPHIC FINDINGS:  1. There are significant calcifications in the proximal LAD versus old  stent. There is moderate calcification involving the mid LAD and there  is also moderate calcification involving circumflex and OM branch. 2.  The left main coronary artery arises normally from the left sinus of  Valsalva. The left main coronary artery is a short vessel that has no  significant CAD. It bifurcates into left anterior descending artery and  left circumflex artery. 3.  The left anterior descending artery is a good size vessel that has  moderately calcified significant stenosis in the mid segment at the  takeoff of a large diagonal branch.   That diagonal branch itself has  also significant stenosis in the ostium and the proximal segment of the  vessel. The LAD disease was estimated at 80%. The diagonal branch was  estimated at 90%. There is another diagonal branch that is totally  occluded and just, we can see the proximal stump of the vessel. 4.  The left circumflex artery is a large vessel that gives off a large  OM branch and small one. The left circumflex artery has 70% proximal  moderately calcified disease and it has another 80% to 90% disease in  the proximal OM branch. There were left-to-right collaterals noted on  coronary angiography. 5.  The right coronary artery is a small vessel that is diffusely  diseased with chronic total occlusion. LEFT HEART CATHETERIZATION:  We used 6-Argentine pigtail catheter for left  heart catheterization and left ventriculography. LVEDP was 24 and there  was no gradient across the aortic valve on pullback. The left  ventriculography showed ejection fraction of 15% with global wall  hypokinesis. There was +1 angiographic mitral valve regurgitation  noted. At the end of the procedure, the catheter and the wire were pulled out  from the body. The sheath was pulled out from the body and a Vasc Band  was applied to the right wrist area with effective hemostasis. COMPLICATIONS:  None. ESTIMATED TOTAL BLOOD LOSS:  25 to 30 mL. MEDICATIONS USED DURING THE PROCEDURE:  We used intraarterial verapamil  to prevent vasospasm. We used intraarterial heparin for  anticoagulation. CONSCIOUS SEDATION:  We used Versed and fentanyl for conscious sedation. First dose was given at 1618. Procedure ended at [facility] [address] There were 27  minutes of direct face-to-face supervision during conscious sedation  administration. Total contrast used was 80 mL of Isovue. Total fluoroscopy time was 3.3 minutes. IMPRESSION:  1.   Severe multivessel disease involving the mid LAD, ostial and  proximal first diagonal branch, totally occluded second diagonal branch. Significant disease involving the proximal left circumflex artery and  mid large OM branch. 2.  Totally occluded RCA. 3.  Severe cardiomyopathy and severely reduced left ventricular systolic  function with moderately elevated LVEDP. RECOMMENDATIONS:  1. Continue current treatment. 2.  CT Surgery evaluation for possible CABG.         Osiris Waters MD    D: 06/22/2021 17:16:06       T: 06/22/2021 18:11:57     [email or web address]  Job#: [id number]     Doc#: [id number]    CC:

## 2021-06-22 NOTE — PROGRESS NOTES
INPATIENT CARDIOLOGY FOLLOW-UP    Name: Bonita Rowell    Age: 78 y.o. Date of Admission: 6/21/2021  5:16 AM    Date of Service: 6/22/2021    Chief Complaint: Follow-up for NSTEMI    Interim History:  No new overnight cardiac complaints. Currently with no complaints of CP, SOB, palpitations, dizziness, or lightheadedness. SR on telemetry. Review of Systems:   Cardiac: As per HPI  General: No fever, chills  Pulmonary: As per HPI  HEENT: No visual disturbances, difficult swallowing  GI: No nausea, vomiting  Endocrine: No thyroid disease or DM  Musculoskeletal: DOWELL x 4, no focal motor deficits  Skin: Intact, no rashes  Neuro/Psych: No headache or seizures    Problem List:  Patient Active Problem List   Diagnosis    Hypertension    Hypercholesterolemia    CAD (coronary artery disease)    Benign prostatic hyperplasia    CTS (carpal tunnel syndrome)    Cervical spondylosis    DM type 2 (diabetes mellitus, type 2) (HCC)    Exogenous obesity    OA (osteoarthritis)    Polyp of colon    Vitamin B12 deficiency    Vitamin D deficiency    Morbidly obese ([facility] [address]    Asthmatic bronchitis, mild intermittent, uncomplicated    Chest pain    Generalized weakness    NSTEMI (non-ST elevated myocardial infarction) ([facility] [address]       Allergies: Allergies   Allergen Reactions    Ace Inhibitors      LIZZ    Anesthetics, Amide      Hospitalization after use.     Vicodin [Hydrocodone-Acetaminophen] Other (See Comments)     Abdominal pain constipation        Current Medications:  Current Facility-Administered Medications   Medication Dose Route Frequency Provider Last Rate Last Admin    sodium chloride flush 0.9 % injection 5-40 mL  5-40 mL Intravenous 2 times per day Butch Rausch MD        sodium chloride flush 0.9 % injection 5-40 mL  5-40 mL Intravenous PRN Butch Rausch MD        0.9 % sodium chloride infusion  25 mL Intravenous PRN Butch Rausch MD        furosemide (LASIX) injection 20 mg  20 mg Intravenous Daily Florida Petty MD        carvedilol (COREG) tablet 6.25 mg  6.25 mg Oral BID  Florida Petty MD        perflutren lipid microspheres (DEFINITY) injection 1.65 mg  1.5 mL Intravenous ONCE PRN Florida Petty MD        aspirin EC tablet 81 mg  81 mg Oral Daily Florida Petty MD   81 mg at 06/22/21 0839    nitroglycerin (NITRO-BID) 2 % ointment 0.5 inch  0.5 inch Topical 4 times per day Florida Petty MD   0.5 inch at 06/22/21 1730    atorvastatin (LIPITOR) tablet 40 mg  40 mg Oral Daily Florida Petty MD   40 mg at 06/22/21 0839    cefdinir (OMNICEF) capsule 300 mg  300 mg Oral BID Florida Petty MD   300 mg at 06/22/21 0839    latanoprost (XALATAN) 0.005 % ophthalmic solution 1 drop  1 drop Right Eye Nightly Florida Petty MD   1 drop at 06/21/21 2020    pantoprazole (PROTONIX) tablet 40 mg  40 mg Oral QAM AC Florida Petty MD   40 mg at 06/22/21 7472    tamsulosin (FLOMAX) capsule 0.4 mg  0.4 mg Oral Daily Florida Petty MD   0.4 mg at 06/22/21 0839    [START ON 6/23/2021] vitamin D (ERGOCALCIFEROL) capsule 50,000 Units  50,000 Units Oral Weekly Florida Petty MD        insulin lispro (HUMALOG) injection vial 0-12 Units  0-12 Units Subcutaneous TID  Florida Petty MD   2 Units at 06/22/21 1730    insulin lispro (HUMALOG) injection vial 0-6 Units  0-6 Units Subcutaneous Nightly Florida Petty MD   1 Units at 06/21/21 2020    glucose (GLUTOSE) 40 % oral gel 15 g  15 g Oral PRN Florida Petty MD        dextrose 50 % IV solution  12.5 g Intravenous PRN Florida Petty MD        glucagon (rDNA) injection 1 mg  1 mg Intramuscular PRN Florida Petty MD        dextrose 5 % solution  100 mL/hr Intravenous PRN Florida Petty MD        acetaminophen (TYLENOL) tablet 500 mg  500 mg Oral Q4H PRN Florida Petty MD   500 mg at 06/21/21 2019      sodium chloride      dextrose         Physical Exam:  BP (!) 111/57   Pulse 88   Temp 97.6 °F (36.4 °C) (Oral)   Resp 18   Ht 5' 9\" (1.753 m)   Wt 225 lb (102.1 kg)   SpO2 96%   BMI 33.23 kg/m²   Wt Readings from Last 3 Encounters:   06/21/21 225 lb (102.1 kg)   06/15/21 210 lb (95.3 kg)   06/14/21 210 lb (95.3 kg)     Appearance: Awake, alert, no acute respiratory distress  Skin: Intact, no rash  Head: Normocephalic, atraumatic  Eyes: EOMI, no conjunctival erythema  ENMT: No pharyngeal erythema, MMM, no rhinorrhea  Neck: Supple, no elevated JVP, no carotid bruits  Lungs: Clear to auscultation bilaterally. No wheezes, rales, or rhonchi. Cardiac: Regular rate and rhythm, +S1S2, no murmurs apparent  Abdomen: Soft, nontender, +bowel sounds  Extremities: Moves all extremities x 4, no lower extremity edema  Neurologic: No focal motor deficits apparent, normal mood and affect  Peripheral Pulses: Intact posterior tibial pulses bilaterally    Intake/Output:    Intake/Output Summary (Last 24 hours) at 6/22/2021 1742  Last data filed at 6/22/2021 1421  Gross per 24 hour   Intake 518.21 ml   Output 25 ml   Net 493.21 ml     No intake/output data recorded.     Laboratory Tests:  Recent Labs     06/21/21  0538 06/22/21  0629    137   K 4.3 4.2    98   CO2 25 25   BUN 25* 24*   CREATININE 1.3* 1.2   GLUCOSE 186* 192*   CALCIUM 9.5 9.3     Lab Results   Component Value Date    MG 1.7 06/21/2021     Recent Labs     06/21/21  0538 06/22/21  0629   ALKPHOS 159* 150*   ALT 54* 43*   AST 19 34   PROT 6.8 7.1   BILITOT 0.6 0.9   LABALBU 3.7 3.8     Recent Labs     06/21/21  0538 06/22/21  0629   WBC 4.4* 4.7   RBC 2.45* 2.30*   HGB 9.1* 8.5*   HCT 27.1* 25.2*   .6* 109.6*   MCH 37.1* 37.0*   MCHC 33.6 33.7   RDW 15.1* 15.4*    209   MPV 10.4 10.7     Lab Results   Component Value Date    CKTOTAL 339 (H) 06/16/2021    CKMB 2.2 06/21/2021    TROPONINI 0.03 12/22/2020    TROPONINI 0.03 12/22/2020    TROPONINI <0.01 06/13/2020     Lab Results   Component Value Date    INR 1.1 06/21/2021    INR 1.0 09/06/2016    INR 1.1 12/28/2015    PROTIME 12.4 06/21/2021    PROTIME 11.6 09/06/2016 PROTIME 12.1 12/28/2015     Lab Results   Component Value Date    TSH 2.600 06/21/2021     Lab Results   Component Value Date    LABA1C 6.7 (H) 03/11/2021     No results found for: EAG  Lab Results   Component Value Date    CHOL 144 03/11/2021    CHOL 147 12/04/2020    CHOL 146 06/15/2020     Lab Results   Component Value Date    TRIG 186 (H) 03/11/2021    TRIG 173 (H) 12/04/2020    TRIG 177 (H) 06/15/2020     Lab Results   Component Value Date    HDL 36 03/11/2021    HDL 37 12/04/2020    HDL 33 (L) 06/15/2020     Lab Results   Component Value Date    LDLCALC 71 03/11/2021    LDLCALC 75 12/04/2020    LDLCALC 86 06/15/2020     Lab Results   Component Value Date    LABVLDL 37 03/11/2021    LABVLDL 35 12/04/2020    LABVLDL 44 03/07/2019     Lab Results   Component Value Date    CHOLHDLRATIO 4.4 06/15/2020    CHOLHDLRATIO 3.6 12/06/2019    CHOLHDLRATIO 3.8 09/20/2019       Cardiac Tests:  Telemetry findings reviewed: SR at rate 90s no new tachy/bradyarrhythmias overnight     EKG: Normal sinus rhythm right bundle branch block, left axis deviation, abnormal EKG.    Vitals and labs were reviewed: As above blood pressure 111/57 rate of 96 sats 94% on room air  Labs-BUN/creatinine 25/1.3>> 24/one-point proBNP 4623 high-sensitivity troponin 71 AST/ALT 19/54 H&H 9.1/27. 1     1. 11/7/1994 [facility] Dr Chriss Dukes: PTCA of LCx--> reduction to 25%s stenosis  2. 2006 Nonischemic stress, NWM.  3. 6/2020 Lexiscan MPS: Non ischemic  4. 11/2020 TTE Dr Garcia Antis: EF 60-65%. Mild AS HANNAH 1.9. Moderately dilated LA.     Cardiac catheterization-6/22/2021: Severe multivessel CAD, full report is pending    Assessment:  · NSTEMI>> multivessel CAD  · Mild decomp heart failure mostly HFpEF,   · History of CAD status post PCI to left circumflex artery-November 1994, diffuse moderate disease throughout LAD territory and chronic total occlusion of the RCA.   · CKD stage III  · Hypertension, well controlled  · Hyperlipidemia on statin  · Chronic right bundle branch block  · Severe hearing loss  · History of LIZZ secondary to ACE inhibitor therapy  · Former smoker with 120-pack-year history of smoking quit in 1981  · Chronic anemia        Plan:  · Cardiac cath from severe multivessel disease. CT surgery consult was placed and awaiting their input. · Continue nitroglycerin paste 0.5 inches every 6 hours  · Limited echo to assess LV function is pending. · Continue Lasix 20mg IV daily   · continue him on O2  · Continue home medications including aspirin 81 daily Norvasc, atorvastatin and metoprolol        Aye Vieira MD., Aide Jones.   [facility]) Cardiology

## 2021-06-22 NOTE — PROGRESS NOTES
Merline Hawking, MD FACP  Progress notes      CHIEF COMPLAINT: Chest pain      HISTORY OF PRESENT ILLNESS:   1021/21   78year-old  woman in the emergency room at [facility] earlier this morning  The son and a registered nurse were at bedside  Data reviewed in detail  Spoke with the ER physician in person  Patient with known history of coronary artery disease had an angioplasty many years ago for the LAD  Recently hospitalized with acute cystitis  Refused to see cardiologist on that admission  Now presents with onset of anterior chest discomfort described as a heaviness that woke him up at 5 AM  Nitroglycerin sublingual apparently relieved the pain in the ER  Troponins BNP were markedly elevated along with EKG changes suggestive of acute non-ST elevation myocardial infarction  He also reports shortness of breath even at rest  6/22/2021  Patient was seen on the floor earlier this morning  No further angina  On IV heparin infusion  Tolerating medications well  Data reviewed in detail  Past Medical History:    Past Medical History:   Diagnosis Date    Arthritis     bilateral knee    BPH (benign prostatic hypertrophy) 8/4/2016    CAD (coronary artery disease)     Cervical spondylosis 8/4/2016    Constipation     prune juice in am    CTS (carpal tunnel syndrome) 8/4/2016    Diabetes mellitus ([facility] [address]     DM type 2 (diabetes mellitus, type 2) ([address] 8/4/2016    Exogenous obesity 8/4/2016    Glaucoma     bilateral    History of EKG 10/14/2015 per Dr Teofilo Sol on chart.     Hypercholesterolemia     Hypertension     OA (osteoarthritis) 8/4/2016    Partial blindness     left eye    Polyp of colon 8/4/2016    PONV (postoperative nausea and vomiting)     Preoperative clearance 11/12/2015    per Dr Teofilo Sol on chart; for right tjak Dr Cathy Jacobs Vitamin B12 deficiency 8/4/2016    Vitamin D deficiency 8/4/2016    Wears glasses        Past Surgical History:    Past Surgical History:   Procedure Laterality Date  CATARACT REMOVAL WITH IMPLANT Bilateral     COLONOSCOPY  10/31/2013    DIAGNOSTIC CARDIAC CATH LAB PROCEDURE  1990 approximately    ? stent    EYE SURGERY Bilateral     cataract    JOINT REPLACEMENT Right 12/08/2015    SHOULDER ARTHROPLASTY Left 2000    TOTAL KNEE ARTHROPLASTY Right 11/30/2015       Medications Prior to Admission:    Medications Prior to Admission: metFORMIN (GLUCOPHAGE-XR) 500 MG extended release tablet, Take 1 tablet by mouth 3 times daily (with meals)  cefdinir (OMNICEF) 300 MG capsule, Take 1 capsule by mouth 2 times daily for 10 days  pantoprazole (PROTONIX) 40 MG tablet, TAKE 1 TABLET BY MOUTH EVERY DAY BEFORE BREAKFAST  oxybutynin (DITROPAN XL) 15 MG extended release tablet, Take 15 mg by mouth daily  valsartan (DIOVAN) 80 MG tablet, Take 1 tablet by mouth daily  amLODIPine (NORVASC) 5 MG tablet, TAKE 1 TABLET BY MOUTH EVERY DAY AT NIGHT  metoprolol tartrate (LOPRESSOR) 50 MG tablet, TAKE 1 TABLET BY MOUTH EVERY DAY  atorvastatin (LIPITOR) 40 MG tablet, TAKE 1 TABLET BY MOUTH EVERY DAY  dapagliflozin (FARXIGA) 10 MG tablet, TAKE 1 TABLET BY MOUTH EVERY DAY IN THE MORNING  vitamin E 400 UNIT capsule, Take 1 capsule by mouth daily  mirabegron (MYRBETRIQ) 25 MG TB24, Take 1 tablet by mouth three times a week Mon-Wed-& Friday  tamsulosin (FLOMAX) 0.4 MG capsule, TAKE 1 CAPSULE BY MOUTH EVERYDAY AT BEDTIME  blood glucose test strips (FREESTYLE TEST STRIPS) strip, Indications: freestyle test strips DX: E11.9 As needed.     Diagnosis is E 11.9  FREESTYLE LANCETS MISC, 1 each by Does not apply route daily Indications: in AM DX: E11.9  Continuous Blood Gluc Sensor ([address] [place] [address]) MISC, 1 Units by Does not apply route 3 times daily  Continuous Blood Gluc  (FREESTYLE MAYELA READER) WALTER, 1 Units by Does not apply route 3 times daily e11.9  aspirin 81 MG EC tablet, Take 81 mg by mouth nightly  Coenzyme Q10 (CO Q-10) 100 MG CAPS, Take 100 mg by mouth nightly  Omega-3 Fatty Acids (FISH OIL) 300 MG CAPS, Take 1 capsule by mouth Daily with supper  vitamin B-12 (CYANOCOBALAMIN) 1000 MCG tablet, Take 1,000 mcg by mouth daily  Ascorbic Acid (VITAMIN C) 500 MG tablet, Take 500 mg by mouth daily  latanoprost (XALATAN) 0.005 % ophthalmic solution, Place 1 drop into the right eye nightly   vitamin D (ERGOCALCIFEROL) 1.25 MG (48346 UT) CAPS capsule, Take 1 capsule by mouth once a week for 12 doses (Patient taking differently: Take 50,000 Units by mouth once a week - Takes on Wednesday)    Allergies:    Ace inhibitors; Anesthetics, amide; and Vicodin [hydrocodone-acetaminophen]    Social History:    reports that he quit smoking about 40 years ago. His smoking use included cigars. He started smoking about 67 years ago. He has a 120.00 pack-year smoking history. He quit smokeless tobacco use about 5 years ago. He reports that he does not drink alcohol and does not use drugs. Family History:   family history includes Heart Disease in his mother; Heart Disease (age of onset: 52) in his father. REVIEW OF SYSTEMS:  As above in the HPI, otherwise negative    PHYSICAL EXAM:    Vitals:  /60   Pulse 83   Temp 98.2 °F (36.8 °C) (Oral)   Resp 20   Ht 5' 9\" (1.753 m)   Wt 225 lb (102.1 kg)   SpO2 96%   BMI 33.23 kg/m²     General:  Awake, alert, oriented X 3. Well developed, well nourished, well groomed. Morbidly obese  In mild to moderate distress  HEENT:  Normocephalic, atraumatic. Pupils equal, round, reactive to light. No scleral icterus. No conjunctival injection. .  No nasal discharge. Neck:  Supple  Heart:  RRR, no murmurs, gallops, rubs  Lungs: Scattered rails in the posterior lung fields noted although chest x-ray reveals no pulmonary edema  Abdomen:   Bowel sounds present, soft, nontender, no masses, no organomegaly, no peritoneal signs  Extremities:  No clubbing, cyanosis, or edema  Skin:  Warm and dry, no open lesions or rash  Neuro:  Cranial nerves 2-12 intact, no focal deficits  Breast: deferred  Rectal: deferred  Genitalia:  deferred    LABS: Data reviewed in detail      ASSESSMENT:      Active Problems:    NSTEMI (non-ST elevated myocardial infarction) ([facility] [address]  Acute CHF likely diastolic based on ischemic event  Patient Active Problem List   Diagnosis    Hypertension    Hypercholesterolemia    CAD (coronary artery disease)    Benign prostatic hyperplasia    CTS (carpal tunnel syndrome)    Cervical spondylosis    DM type 2 (diabetes mellitus, type 2) (HCC)    Exogenous obesity    OA (osteoarthritis)    Polyp of colon    Vitamin B12 deficiency    Vitamin D deficiency    Morbidly obese ([facility] [address]    Asthmatic bronchitis, mild intermittent, uncomplicated    Chest pain    Generalized weakness    NSTEMI (non-ST elevated myocardial infarction) ([facility] [address]           PLAN:  Heparin infusion  Nitroglycerin  Aspirin/beta-blockers/statins  Cardiology consult  Reviewed in detail with the son at bedside    Woodford Denver, MD  7:53 AM  6/22/2021  Woodford Denver, MD FACP   History and Physical      CHIEF COMPLAINT: Chest pain      HISTORY OF PRESENT ILLNESS:    49-year-old  woman in the emergency room at [facility] earlier this morning  The son and a registered nurse were at bedside  Data reviewed in detail  Spoke with the ER physician in person  Patient with known history of coronary artery disease had an angioplasty many years ago for the LAD  Recently hospitalized with acute cystitis  Refused to see cardiologist on that admission  Now presents with onset of anterior chest discomfort described as a heaviness that woke him up at 5 AM  Nitroglycerin sublingual apparently relieved the pain in the ER  Troponins BNP were markedly elevated along with EKG changes suggestive of acute non-ST elevation myocardial infarction  He also reports shortness of breath even at rest    Past Medical History:    Past Medical History:   Diagnosis Date    Arthritis     bilateral knee    BPH (benign prostatic hypertrophy) 8/4/2016    CAD (coronary artery disease)     Cervical spondylosis 8/4/2016    Constipation     prune juice in am    CTS (carpal tunnel syndrome) 8/4/2016    Diabetes mellitus ([facility] [address]     DM type 2 (diabetes mellitus, type 2) ([facility] [address] 8/4/2016    Exogenous obesity 8/4/2016    Glaucoma     bilateral    History of EKG 10/14/2015 per Dr Luciano Cabrales on chart.     Hypercholesterolemia     Hypertension     OA (osteoarthritis) 8/4/2016    Partial blindness     left eye    Polyp of colon 8/4/2016    PONV (postoperative nausea and vomiting)     Preoperative clearance 11/12/2015    per Dr Luciano Cabrales on chart; for right tjak Dr Naye Condon Vitamin B12 deficiency 8/4/2016    Vitamin D deficiency 8/4/2016    Wears glasses        Past Surgical History:    Past Surgical History:   Procedure Laterality Date    CATARACT REMOVAL WITH IMPLANT Bilateral     COLONOSCOPY  10/31/2013    DIAGNOSTIC CARDIAC CATH LAB PROCEDURE  1990 approximately    ? stent    EYE SURGERY Bilateral     cataract    JOINT REPLACEMENT Right 12/08/2015    SHOULDER ARTHROPLASTY Left 2000    TOTAL KNEE ARTHROPLASTY Right 11/30/2015       Medications Prior to Admission:    Medications Prior to Admission: metFORMIN (GLUCOPHAGE-XR) 500 MG extended release tablet, Take 1 tablet by mouth 3 times daily (with meals)  cefdinir (OMNICEF) 300 MG capsule, Take 1 capsule by mouth 2 times daily for 10 days  pantoprazole (PROTONIX) 40 MG tablet, TAKE 1 TABLET BY MOUTH EVERY DAY BEFORE BREAKFAST  oxybutynin (DITROPAN XL) 15 MG extended release tablet, Take 15 mg by mouth daily  valsartan (DIOVAN) 80 MG tablet, Take 1 tablet by mouth daily  amLODIPine (NORVASC) 5 MG tablet, TAKE 1 TABLET BY MOUTH EVERY DAY AT NIGHT  metoprolol tartrate (LOPRESSOR) 50 MG tablet, TAKE 1 TABLET BY MOUTH EVERY DAY  atorvastatin (LIPITOR) 40 MG tablet, TAKE 1 TABLET BY MOUTH EVERY DAY  dapagliflozin (FARXIGA) 10 MG tablet, TAKE 1 TABLET BY MOUTH EVERY DAY IN THE MORNING  vitamin E 400 UNIT capsule, Take 1 capsule by mouth daily  mirabegron (MYRBETRIQ) 25 MG TB24, Take 1 tablet by mouth three times a week Mon-Wed-& Friday  tamsulosin (FLOMAX) 0.4 MG capsule, TAKE 1 CAPSULE BY MOUTH EVERYDAY AT BEDTIME  blood glucose test strips (FREESTYLE TEST STRIPS) strip, Indications: freestyle test strips DX: E11.9 As needed. Diagnosis is E 11.9  FREESTYLE LANCETS MISC, 1 each by Does not apply route daily Indications: in AM DX: E11.9  Continuous Blood Gluc Sensor ([facility]) MISC, 1 Units by Does not apply route 3 times daily  Continuous Blood Gluc  (FREESTYLE MAYELA READER) WALTER, 1 Units by Does not apply route 3 times daily e11.9  aspirin 81 MG EC tablet, Take 81 mg by mouth nightly  Coenzyme Q10 (CO Q-10) 100 MG CAPS, Take 100 mg by mouth nightly  Omega-3 Fatty Acids (FISH OIL) 300 MG CAPS, Take 1 capsule by mouth Daily with supper  vitamin B-12 (CYANOCOBALAMIN) 1000 MCG tablet, Take 1,000 mcg by mouth daily  Ascorbic Acid (VITAMIN C) 500 MG tablet, Take 500 mg by mouth daily  latanoprost (XALATAN) 0.005 % ophthalmic solution, Place 1 drop into the right eye nightly   vitamin D (ERGOCALCIFEROL) 1.25 MG (86805 UT) CAPS capsule, Take 1 capsule by mouth once a week for 12 doses (Patient taking differently: Take 50,000 Units by mouth once a week - Takes on Wednesday)    Allergies:    Ace inhibitors; Anesthetics, amide; and Vicodin [hydrocodone-acetaminophen]    Social History:    reports that he quit smoking about 40 years ago. His smoking use included cigars. He started smoking about 67 years ago. He has a 120.00 pack-year smoking history. He quit smokeless tobacco use about 5 years ago. He reports that he does not drink alcohol and does not use drugs. Family History:   family history includes Heart Disease in his mother; Heart Disease (age of onset: 52) in his father.     REVIEW OF SYSTEMS:  As above in the HPI, otherwise negative    PHYSICAL EXAM:    Vitals:  /60   Pulse 83   Temp 98.2 °F (36.8 °C) (Oral)   Resp 20   Ht 5' 9\" (1.753 m)   Wt 225 lb (102.1 kg)   SpO2 96%   BMI 33.23 kg/m²     General:  Awake, alert, oriented X 3. Well developed, well nourished, well groomed. Morbidly obese  In mild to moderate distress  HEENT:  Normocephalic, atraumatic. Pupils equal, round, reactive to light. No scleral icterus. No conjunctival injection. .  No nasal discharge. Neck:  Supple  Heart:  RRR, no murmurs, gallops, rubs  Lungs: Scattered rails in the posterior lung fields noted although chest x-ray reveals no pulmonary edema  Abdomen:   Bowel sounds present, soft, nontender, no masses, no organomegaly, no peritoneal signs  Extremities:  No clubbing, cyanosis, or edema  Skin:  Warm and dry, no open lesions or rash  Neuro:  Cranial nerves 2-12 intact, no focal deficits  Breast: deferred  Rectal: deferred  Genitalia:  deferred    LABS: Data reviewed in detail      ASSESSMENT:      Active Problems:    NSTEMI (non-ST elevated myocardial infarction) ([facility] [address]  Acute CHF likely diastolic based on ischemic event  Patient Active Problem List   Diagnosis    Hypertension    Hypercholesterolemia    CAD (coronary artery disease)    Benign prostatic hyperplasia    CTS (carpal tunnel syndrome)    Cervical spondylosis    DM type 2 (diabetes mellitus, type 2) ([facility])    Exogenous obesity    OA (osteoarthritis)    Polyp of colon    Vitamin B12 deficiency    Vitamin D deficiency    Morbidly obese (HCC)    Asthmatic bronchitis, mild intermittent, uncomplicated    Chest pain    Generalized weakness    NSTEMI (non-ST elevated myocardial infarction) ([facility])           PLAN:  Heparin infusion  Nitroglycerin  Aspirin/beta-blockers/statins  Cardiology consult appreciated  Reviewed in detail with the patient  Coronary angiography and/or intervention planned    Mckenna Anne MD  7:53 AM  6/22/2021

## 2021-06-23 NOTE — PROGRESS NOTES
Patient becoming more aggressive with staff. He is confused and unable to reorient. His safety is at risk because he is so unsteady on his feet at this time. Dr Julia Love notified of this concern. Order obtained for 3 point soft restraints for patient safety. Bilateral legs and left wrist as patient had a right radial cath today.

## 2021-06-23 NOTE — CONSULTS
CTS Consult    Patient name: Allegra Luke    Reason for consult:  NSTEMI    Referring Physician:  Dr. Clement Loza     Primary Care Physician: Mckenna Anne MD    Date of service: 6/23/2021    Chief Complaint:  Chest pain    HPI:  79 yo male with hx of hypertension, hyperlipidemia, type 2 diabetes, CKD who presents with complaints of chest pain. He was noted to have an NSTEMI and underwent LHC. This revealed severe MVCAD. He appears tachypnic and confused currently requiring restraints. Allergies: Allergies   Allergen Reactions    Ace Inhibitors      LIZZ    Anesthetics, Amide      Hospitalization after use.     Vicodin [Hydrocodone-Acetaminophen] Other (See Comments)     Abdominal pain constipation        Home medications:    Current Facility-Administered Medications   Medication Dose Route Frequency Provider Last Rate Last Admin    ipratropium-albuterol (DUONEB) nebulizer solution 1 ampule  1 ampule Inhalation Q6H Rojas Grove MD   1 ampule at 06/23/21 0928    sodium chloride flush 0.9 % injection 5-40 mL  5-40 mL Intravenous 2 times per day Oj Bourne MD   10 mL at 06/23/21 0914    sodium chloride flush 0.9 % injection 5-40 mL  5-40 mL Intravenous PRN Oj Bourne MD        0.9 % sodium chloride infusion  25 mL Intravenous PRN Oj Bourne MD        furosemide (LASIX) injection 20 mg  20 mg Intravenous Daily Oj Bourne MD   20 mg at 06/23/21 0912    carvedilol (COREG) tablet 6.25 mg  6.25 mg Oral BID WC Oj Bourne MD   6.25 mg at 06/23/21 0912    aspirin EC tablet 81 mg  81 mg Oral Daily Oj Bourne MD   81 mg at 06/23/21 0913    nitroglycerin (NITRO-BID) 2 % ointment 0.5 inch  0.5 inch Topical 4 times per day Oj Bourne MD   0.5 inch at 06/23/21 1154    atorvastatin (LIPITOR) tablet 40 mg  40 mg Oral Daily Oj Bourne MD   40 mg at 06/23/21 0913    cefdinir (OMNICEF) capsule 300 mg  300 mg Oral BID Oj Bourne MD   300 mg at 06/23/21 0913    latanoprost ([address]) 0.005 % ophthalmic solution 1 drop  1 drop Right Eye Nightly Celestino Ellington MD   1 drop at 06/21/21 2020    pantoprazole (PROTONIX) tablet 40 mg  40 mg Oral QAM AC Celestino Ellington MD   40 mg at 06/23/21 4319    tamsulosin (FLOMAX) capsule 0.4 mg  0.4 mg Oral Daily Celestino Ellington MD   0.4 mg at 06/23/21 0912    vitamin D (ERGOCALCIFEROL) capsule 50,000 Units  50,000 Units Oral Weekly Celestino Ellington MD   50,000 Units at 06/23/21 0913    insulin lispro (HUMALOG) injection vial 0-12 Units  0-12 Units Subcutaneous TID WC Celestino Ellington MD   6 Units at 06/23/21 1200    insulin lispro (HUMALOG) injection vial 0-6 Units  0-6 Units Subcutaneous Nightly Celestino Ellington MD   1 Units at 06/21/21 2020    glucose (GLUTOSE) 40 % oral gel 15 g  15 g Oral PRN Celestino Ellington MD        dextrose 50 % IV solution  12.5 g Intravenous PRN Celestino Ellington MD        glucagon (rDNA) injection 1 mg  1 mg Intramuscular PRN Celestino Ellington MD        dextrose 5 % solution  100 mL/hr Intravenous PRN Celestino Ellington MD        acetaminophen (TYLENOL) tablet 500 mg  500 mg Oral Q4H PRN Celestino Ellington MD   500 mg at 06/22/21 2155       Past Medical History:  Past Medical History:   Diagnosis Date    Arthritis     bilateral knee    BPH (benign prostatic hypertrophy) 8/4/2016    CAD (coronary artery disease)     Cervical spondylosis 8/4/2016    Constipation     prune juice in am    CTS (carpal tunnel syndrome) 8/4/2016    Diabetes mellitus ([facility] [address]     DM type 2 (diabetes mellitus, type 2) ([facility] [address] 8/4/2016    Exogenous obesity 8/4/2016    Glaucoma     bilateral    History of EKG 10/14/2015 per Dr Jodi Bethea on chart.     Hypercholesterolemia     Hypertension     OA (osteoarthritis) 8/4/2016    Partial blindness     left eye    Polyp of colon 8/4/2016    PONV (postoperative nausea and vomiting)     Preoperative clearance 11/12/2015    per Dr Jodi Bethea on chart; for right tjak Dr Tiffanie Barth Vitamin B12 deficiency 8/4/2016    Vitamin D deficiency 8/4/2016  Wears glasses        Past Surgical History:  Past Surgical History:   Procedure Laterality Date    CARDIAC CATHETERIZATION  2021    [place]    CATARACT REMOVAL WITH IMPLANT Bilateral     COLONOSCOPY  10/31/2013    DIAGNOSTIC CARDIAC CATH LAB PROCEDURE   approximately    ? stent    EYE SURGERY Bilateral     cataract    JOINT REPLACEMENT Right 2015    SHOULDER ARTHROPLASTY Left 2000    TOTAL KNEE ARTHROPLASTY Right 2015       Social History:  Social History     Socioeconomic History    Marital status:      Spouse name: Not on file    Number of children: Not on file    Years of education: 15    Highest education level: Not on file   Occupational History    Not on file   Tobacco Use    Smoking status: Former Smoker     Packs/day: 5.00     Years: 24.00     Pack years: 120.00     Types: Cigars     Start date:      Quit date: 1981     Years since quittin.5    Smokeless tobacco: Former User     Quit date: 2015    Tobacco comment: smoked 5 cigars daily in past , no cigaretts/    Substance and Sexual Activity    Alcohol use: No    Drug use: No    Sexual activity: Not on file   Other Topics Concern    Not on file   Social History Narrative    Not on file     Social Determinants of Health     Financial Resource Strain: Low Risk     Difficulty of Paying Living Expenses: Not hard at all   Food Insecurity: No Food Insecurity    Worried About [address] in the Last Year: Never true    [facility] in the Last Year: Never true   Transportation Needs: No Transportation Needs    Lack of Transportation (Medical): No    Lack of Transportation (Non-Medical):  No   Physical Activity:     Days of Exercise per Week:     Minutes of Exercise per Session:    Stress:     Feeling of Stress :    Social Connections:     Frequency of Communication with Friends and Family:     Frequency of Social Gatherings with Friends and Family:     Attends Holiness Services:     Active Member of Clubs or Organizations:     Attends Club or Organization Meetings:     Marital Status:    Intimate Partner Violence:     Fear of Current or Ex-Partner:     Emotionally Abused:     Physically Abused:     Sexually Abused:        Family History:  Family History   Problem Relation Age of Onset    Heart Disease Mother         AFIB    Heart Disease Father 52         MI       Review of Systems:  Constitutional: Denies fevers, chills, or weight loss. HEENT: Denies visual changes or hearing loss. Heart: As per HPI. Lungs: Denies shortness of breath, cough, or wheezing. Gastrointestinal: Denies nausea, vomiting, constipation, or diarrhea. Genitourinary: dysuria or hematuria. Psychiatric: Patient denies anxiety or depression. Neurologic: Patient denies weakness of the extremities, dizziness, or headaches. All other ROS checked and found to be negative. Labs:  Recent Labs     21  0538 21  0629 21  0756   WBC 4.4* 4.7 5.5   HGB 9.1* 8.5* 8.4*   HCT 27.1* 25.2* 24.4*    209 234      Recent Labs     21  0538 21  0629 21  0756   BUN 25* 24* 32*   CREATININE 1.3* 1.2 1.3*       Objective:  Vitals /70   Pulse 104   Temp 98 °F (36.7 °C) (Oral)   Resp 20   Ht 5' 9\" (1.753 m)   Wt 225 lb (102.1 kg)   SpO2 97%   BMI 33.23 kg/m²   General Appearance: Pleasant 78y.o. year old male who appears stated age. Communicates well, no acute distress. HEENT: Head is normocephalic, atraumatic. EOMs intact, PERRL. Trachea midline. Lungs: Normal respiratory rate and normal effort. He is not in respiratory distress. Breath sounds clear to auscultation. No wheezes. Heart: Normal rate. Regular rhythm. S1 normal and S2 normal.    Chest: Symmetric chest wall expansion. Extremities: Normal range of motion. Neurological: Patient is alert and oriented to person, place and time. Patient has normal reflexes.    Skin: Warm and

## 2021-06-23 NOTE — PROGRESS NOTES
Mckenna Anne MD FACP  Progress notes      CHIEF COMPLAINT: Chest pain      HISTORY OF PRESENT ILLNESS:   1021/21   78year-old  woman in the emergency room at [facility] earlier this morning  The son and a registered nurse were at bedside  Data reviewed in detail  Spoke with the ER physician in person  Patient with known history of coronary artery disease had an angioplasty many years ago for the LAD  Recently hospitalized with acute cystitis  Refused to see cardiologist on that admission  Now presents with onset of anterior chest discomfort described as a heaviness that woke him up at 5 AM  Nitroglycerin sublingual apparently relieved the pain in the ER  Troponins BNP were markedly elevated along with EKG changes suggestive of acute non-ST elevation myocardial infarction  He also reports shortness of breath even at rest  6/22/2021  Patient was seen on the floor earlier this morning  No further angina  On IV heparin infusion  Tolerating medications well  Data reviewed in detail  6/23/21  Patient was seen on the floor earlier this morning  Son was at bedside  Overnight delirium confusion requiring restraints and Haldol  Upon explaining things to the patient he seemed to understand what I was saying  Coronary angiography showing multivessel disease with severe left ventricular dysfunction  Spoke with the son at length  Past Medical History:    Past Medical History:   Diagnosis Date    Arthritis     bilateral knee    BPH (benign prostatic hypertrophy) 8/4/2016    CAD (coronary artery disease)     Cervical spondylosis 8/4/2016    Constipation     prune juice in am    CTS (carpal tunnel syndrome) 8/4/2016    Diabetes mellitus ([address]     DM type 2 (diabetes mellitus, type 2) ([address] 8/4/2016    Exogenous obesity 8/4/2016    Glaucoma     bilateral    History of EKG 10/14/2015 per Dr Sarah Bustamante on chart.     Hypercholesterolemia     Hypertension     OA (osteoarthritis) 8/4/2016    Partial blindness left eye    Polyp of colon 8/4/2016    PONV (postoperative nausea and vomiting)     Preoperative clearance 11/12/2015    per Dr Shahida Willson on chart; for right tjak Dr Michelle Chahal Vitamin B12 deficiency 8/4/2016    Vitamin D deficiency 8/4/2016    Wears glasses        Past Surgical History:    Past Surgical History:   Procedure Laterality Date    CARDIAC CATHETERIZATION  06/22/2021    [place]    CATARACT REMOVAL WITH IMPLANT Bilateral     COLONOSCOPY  10/31/2013    DIAGNOSTIC CARDIAC CATH LAB PROCEDURE  1990 approximately    ? stent    EYE SURGERY Bilateral     cataract    JOINT REPLACEMENT Right 12/08/2015    SHOULDER ARTHROPLASTY Left 2000    TOTAL KNEE ARTHROPLASTY Right 11/30/2015       Medications Prior to Admission:    Medications Prior to Admission: metFORMIN (GLUCOPHAGE-XR) 500 MG extended release tablet, Take 1 tablet by mouth 3 times daily (with meals)  cefdinir (OMNICEF) 300 MG capsule, Take 1 capsule by mouth 2 times daily for 10 days  pantoprazole (PROTONIX) 40 MG tablet, TAKE 1 TABLET BY MOUTH EVERY DAY BEFORE BREAKFAST  oxybutynin (DITROPAN XL) 15 MG extended release tablet, Take 15 mg by mouth daily  valsartan (DIOVAN) 80 MG tablet, Take 1 tablet by mouth daily  amLODIPine (NORVASC) 5 MG tablet, TAKE 1 TABLET BY MOUTH EVERY DAY AT NIGHT  metoprolol tartrate (LOPRESSOR) 50 MG tablet, TAKE 1 TABLET BY MOUTH EVERY DAY  atorvastatin (LIPITOR) 40 MG tablet, TAKE 1 TABLET BY MOUTH EVERY DAY  dapagliflozin (FARXIGA) 10 MG tablet, TAKE 1 TABLET BY MOUTH EVERY DAY IN THE MORNING  vitamin E 400 UNIT capsule, Take 1 capsule by mouth daily  mirabegron (MYRBETRIQ) 25 MG TB24, Take 1 tablet by mouth three times a week Mon-Wed-& Friday  tamsulosin (FLOMAX) 0.4 MG capsule, TAKE 1 CAPSULE BY MOUTH EVERYDAY AT BEDTIME  blood glucose test strips (FREESTYLE TEST STRIPS) strip, Indications: freestyle test strips DX: E11.9 As needed.     Diagnosis is E 11.9  FREESTYLE LANCETS MISC, 1 each by Does not apply route discharge. Neck:  Supple  Heart:  RRR, no murmurs, gallops, rubs  Lungs: Scattered rails in the posterior lung fields noted although chest x-ray reveals no pulmonary edema  Abdomen:   Bowel sounds present, soft, nontender, no masses, no organomegaly, no peritoneal signs  Extremities:  No clubbing, cyanosis, or edema  Skin:  Warm and dry, no open lesions or rash  Neuro:  Cranial nerves 2-12 intact, no focal deficits  Breast: deferred  Rectal: deferred  Genitalia:  deferred    LABS: Data reviewed in detail      ASSESSMENT:      Active Problems:    NSTEMI (non-ST elevated myocardial infarction) ([address]  Acute CHF likely diastolic based on ischemic event  Multivessel coronary artery disease noted on angiography with a severe LV dysfunction  Acute delirium  Patient Active Problem List   Diagnosis    Hypertension    Hypercholesterolemia    CAD (coronary artery disease)    Benign prostatic hyperplasia    CTS (carpal tunnel syndrome)    Cervical spondylosis    DM type 2 (diabetes mellitus, type 2) (HCC)    Exogenous obesity    OA (osteoarthritis)    Polyp of colon    Vitamin B12 deficiency    Vitamin D deficiency    Morbidly obese (HCC)    Asthmatic bronchitis, mild intermittent, uncomplicated    Chest pain    Generalized weakness    NSTEMI (non-ST elevated myocardial infarction) [facility])           PLAN:  Medical management is probably the best mode of therapy for this gentleman with several comorbidities including mental status changes  Son is somewhat agreeable  Will discuss with rest of the team of Darnell Bucio MD  2:55 PM  6/23/2021

## 2021-06-23 NOTE — CARE COORDINATION
SOCIAL WORK/DISCHARGE PLANNING;  Pt with agitation, confusion after heart cath. Per nursing, required restraint. They reported family states pt gets this way after having sedation. Notified by Lionel Montilla of [facility], they can accept pt for [facility] if plan remains for discharge home when medically stable. Will need physician order. Will follow to further assess for appropriateness for home as pt is more medically stable.   Shaunna Barajas [facility]  [phone]

## 2021-06-23 NOTE — PROGRESS NOTES
Removed left wrist restraint, patient removed oxygen and tried to get out of bed. Reapplied restraints. Call light in place. Family at bedside.

## 2021-06-23 NOTE — PROGRESS NOTES
Physical Therapy  Physical Therapy Initial Assessment     Name: Reinaldo Ledesma  : 1940  MRN: [id number]      Date of Service: 2021    Evaluating PT:  Nadege aVllecillo, PT [id number]      Room #:  5818/4515-E  Diagnosis:  NSTEMI (non-ST elevated myocardial infarction) [facility]) [I21.4]  PMHx/PSHx:     has a past surgical history that includes Diagnostic Cardiac Cath Lab Procedure ( approximately); eye surgery (Bilateral); Total shoulder arthroplasty (Left, ); Cataract removal with implant (Bilateral); Total knee arthroplasty (Right, 2015); Colonoscopy (10/31/2013); joint replacement (Right, 2015); and Cardiac catheterization (2021). has a past surgical history that includes Diagnostic Cardiac Cath Lab Procedure ( approximately); eye surgery (Bilateral); Total shoulder arthroplasty (Left, ); Cataract removal with implant (Bilateral); Total knee arthroplasty (Right, 2015); Colonoscopy (10/31/2013); joint replacement (Right, 2015); and Cardiac catheterization (2021). Procedure/Surgery:  Cardiac catheterization-2021  Precautions:  Falls, O2 , FWB (full weight bearing), restraints secondary to confusion pulling at lines. Equipment Needs: To be determined. SUBJECTIVE:    Patient lives with spouse  in a ranch home  with 2 steps to enter without 1Rail  Bed is on 1 floor and bath is on 1 floor. Patient ambulated with cane  PTA. Equipment owned: [address] [facility] [place],    Wife provided information regarding home setup. OBJECTIVE:   Initial Evaluation  Date: 21 Treatment Short Term/ Long Term   Goals   AM-PAC 6 Clicks 94/96     Was pt agreeable to Eval/treatment? yes     Does pt have pain? None stated. Bed Mobility  Rolling: Min  Supine to sit: Mod  Sit to supine: Mod  Scooting: Mod  Rolling: Ind  Supine to sit: Ind  Sit to supine: Ind  Scooting: Ind   Transfers Sit to stand: Mod   Stand to sit: Mod  Stand pivot: NT   Sit to stand:  Mod Ind  Stand to sit: Mod Ind  Stand pivot: Mod Ind   Ambulation    side steps to [facility]. Flexed posture. 100 feet with device if needed if possible. Stair negotiation: ascended and descended  NT  2 steps with 1 rail Min   ROM BUE:  Defer to see OT eval  BLE:  wfl     Strength BUE: defer to  OT eval  RLE:  4/5  LLE:   4/5  4+/5   Balance Sitting EOB:  Min for safety  Dynamic Standing: Mod  Sitting EOB:  Ind  Dynamic Standing: Mod Ind     Patient is Alert & Oriented x person required constant cues to stay on task and follows directions   Sensation:  Pt denies numbness and tingling to extremities  Edema:  BLE. Vitals:  Supine   Blood Pressure at rest -   Heart Rate at rest 100 bpm   SPO2 at rest 95% 3L     Therapeutic Exercises:  Functional activity     Patient education  Pt educated on role of PT     Patient response to education:   Pt verbalized understanding Pt demonstrated skill Pt requires further education in this area   no no Reminders     ASSESSMENT:    Conditions Requiring Skilled Therapeutic Intervention:    [x]Decreased strength     [x]Decreased ROM  [x]Decreased functional mobility  [x]Decreased balance   [x]Decreased endurance   [x]Decreased posture  []Decreased sensation  []Decreased coordination   []Decreased vision  [x]Decreased safety awareness   []Increased pain       Comments:    RN cleared patient for participation in therapy session. Patient was seen this date for PT evaluation. . Patient was agreeable to intervention. Results of the functional assessment are noted above. Upon entering the room patient was found supine in bed. Assisted to Sat EOB x 15 minutes to increase dynamic sitting balance and activity tolerance. Sit to stand completed with side steps. Patient returned to bed for safety. At end of session, patient in bed with [facility] elevated restraints secured bed alarm activated call light and phone within reach,  all lines and tubes intact, nursing notified.    This patient can benefit from the continuation of skilled PT  to maximize functional level and return to PLOF. Treatment:  Patient practiced and was instructed in the following treatment:    · Bed mobility training - pt given verbal and tactile cues to facilitate proper sequencing and safety during rolling and supine>sit as well as provided with physical assistance to complete task    · STS and transfer training - educated on hand/foot placement, safety, and sequencing during STS and pivot transfers using assistive device. t       Pt's/ family goals   1. None stated. Prognosis is good for reaching above PT goals. Patient and or family understand(s) diagnosis, prognosis, and plan of care. yes    PHYSICAL THERAPY PLAN OF CARE:    PT POC is established based on physician order and patient diagnosis     Referring provider/PT Order:    06/22/21 1100  PT eval and treat        Tanner Owens MD       Diagnosis:  NSTEMI (non-ST elevated myocardial infarction) [facility]) [I21.4]  Specific instructions for next treatment:  Transfer OOB to bedside chair. Current Treatment Recommendations:     [x] Strengthening to improve independence with functional mobility   [x] ROM to improve independence with functional mobility   [x] Balance Training to improve static/dynamic balance and to reduce fall risk  [x] Endurance Training to improve activity tolerance during functional mobility   [x] Transfer Training to improve safety and independence with all functional transfers   [x] Gait Training to improve gait mechanics, endurance and asses need for appropriate assistive device  [x] Stair Training in preparation for safe discharge home and/or into the community   [] Positioning to prevent skin breakdown and contractures  [x] Safety and Education Training   [] Patient/Caregiver Education   [] HEP  [] Other     PT long term treatment goals are located in above grid    Frequency of treatments: 2-5x/week x 1-2 weeks.     Time in  1335  Time out  1405    Total Treatment Time  30 minutes     Evaluation Time includes thorough review of current medical information, gathering information on past medical history/social history and prior level of function, completion of standardized testing/informal observation of tasks, assessment of data and education on plan of care and goals.     CPT codes:  [x] Low Complexity PT evaluation 66595  [] Moderate Complexity PT evaluation 33815  [] High Complexity PT evaluation 88596  [] PT Re-evaluation 19682  [] Gait training 20629 - minutes  [] Manual therapy 67187 - minutes  [x] Therapeutic activities 09166 10 minutes  [] Therapeutic exercises 66468 - minutes  [] Neuromuscular reeducation 47083 - minutes     [facility] [address] [facility]

## 2021-06-23 NOTE — PLAN OF CARE
Problem: Non-Violent Restraints  Goal: Removal from restraints as soon as assessed to be safe  Outcome: Met This Shift  Goal: No harm/injury to patient while restraints in use  Outcome: Met This Shift  Goal: Patient's dignity will be maintained  Outcome: Met This Shift

## 2021-06-23 NOTE — PROGRESS NOTES
INPATIENT CARDIOLOGY FOLLOW-UP    Name: Jamil Olmedo    Age: 78 y.o. Date of Admission: 6/21/2021  5:16 AM    Date of Service: 6/23/2021    Chief Complaint: Follow-up for NSTEMI    Interim History:  Apparently, patient became confused overnight and required restraints. He is having increased dyspnea and this morning he is wheezing tachypneic. Patient's son is at the bedside. Dr. Ulysees Kussmaul from CT surgery evaluated the patient and final input is pending. As per his son, Bryce Avitia  told him that he is not going to be a candidate for surgery. , currently with no complaints of CP, palpitations, dizziness, or lightheadedness. SR on telemetry. Review of Systems:   Cardiac: As per HPI  General: No fever, chills  Pulmonary: As per HPI  HEENT: No visual disturbances, difficult swallowing  GI: No nausea, vomiting  Endocrine: No thyroid disease or DM  Musculoskeletal: DOWELL x 4, no focal motor deficits  Skin: Intact, no rashes  Neuro/Psych: No headache or seizures    Problem List:  Patient Active Problem List   Diagnosis    Hypertension    Hypercholesterolemia    CAD (coronary artery disease)    Benign prostatic hyperplasia    CTS (carpal tunnel syndrome)    Cervical spondylosis    DM type 2 (diabetes mellitus, type 2) (HCC)    Exogenous obesity    OA (osteoarthritis)    Polyp of colon    Vitamin B12 deficiency    Vitamin D deficiency    Morbidly obese ([address]    Asthmatic bronchitis, mild intermittent, uncomplicated    Chest pain    Generalized weakness    NSTEMI (non-ST elevated myocardial infarction) ([address]       Allergies: Allergies   Allergen Reactions    Ace Inhibitors      LIZZ    Anesthetics, Amide      Hospitalization after use.     Vicodin [Hydrocodone-Acetaminophen] Other (See Comments)     Abdominal pain constipation        Current Medications:  Current Facility-Administered Medications   Medication Dose Route Frequency Provider Last Rate Last Admin    ipratropium-albuterol (Jason Llamas) nebulizer solution 1 ampule  1 ampule Inhalation Q6H Joann Aguillon MD        sodium chloride flush 0.9 % injection 5-40 mL  5-40 mL Intravenous 2 times per day Fabrizio Moran MD   10 mL at 06/23/21 0914    sodium chloride flush 0.9 % injection 5-40 mL  5-40 mL Intravenous PRN Fabrizio Moran MD        0.9 % sodium chloride infusion  25 mL Intravenous PRN Fabrizio Moran MD        furosemide (LASIX) injection 20 mg  20 mg Intravenous Daily Fabrizio Moran MD   20 mg at 06/23/21 0912    carvedilol (COREG) tablet 6.25 mg  6.25 mg Oral BID  Fabrizio Moran MD   6.25 mg at 06/23/21 0912    perflutren lipid microspheres (DEFINITY) injection 1.65 mg  1.5 mL Intravenous ONCE PRN Fabrizio Moran MD        aspirin EC tablet 81 mg  81 mg Oral Daily Fabrizio Moran MD   81 mg at 06/23/21 0913    nitroglycerin (NITRO-BID) 2 % ointment 0.5 inch  0.5 inch Topical 4 times per day Fabrizio Moran MD   0.5 inch at 06/23/21 0621    atorvastatin (LIPITOR) tablet 40 mg  40 mg Oral Daily Fabrizio Moran MD   40 mg at 06/23/21 0913    cefdinir (OMNICEF) capsule 300 mg  300 mg Oral BID Fabrizio Moran MD   300 mg at 06/23/21 0913    latanoprost (XALATAN) 0.005 % ophthalmic solution 1 drop  1 drop Right Eye Nightly Fabrizio Moran MD   1 drop at 06/21/21 2020    pantoprazole (PROTONIX) tablet 40 mg  40 mg Oral QAM AC Fabrizio Moran MD   40 mg at 06/23/21 0621    tamsulosin (FLOMAX) capsule 0.4 mg  0.4 mg Oral Daily Fabrizio Moran MD   0.4 mg at 06/23/21 0912    vitamin D (ERGOCALCIFEROL) capsule 50,000 Units  50,000 Units Oral Weekly Fabrizio Moran MD   50,000 Units at 06/23/21 0913    insulin lispro (HUMALOG) injection vial 0-12 Units  0-12 Units Subcutaneous TID  Fabrizio Moran MD   2 Units at 06/22/21 1730    insulin lispro (HUMALOG) injection vial 0-6 Units  0-6 Units Subcutaneous Nightly Fabrizio Moran MD   1 Units at 06/21/21 2020    glucose (GLUTOSE) 40 % oral gel 15 g  15 g Oral PRN Fabrizio Moran MD        dextrose 50 % IV solution 12.5 g Intravenous PRN Florida Santos MD        glucagon (rDNA) injection 1 mg  1 mg Intramuscular PRN Florida Santos MD        dextrose 5 % solution  100 mL/hr Intravenous PRN Florida Santos MD        acetaminophen (TYLENOL) tablet 500 mg  500 mg Oral Q4H PRN Florida Santos MD   500 mg at 06/22/21 2155      sodium chloride      dextrose         Physical Exam:  /70   Pulse 104   Temp 98 °F (36.7 °C) (Oral)   Resp 20   Ht 5' 9\" (1.753 m)   Wt 225 lb (102.1 kg)   SpO2 97%   BMI 33.23 kg/m²   Wt Readings from Last 3 Encounters:   06/21/21 225 lb (102.1 kg)   06/15/21 210 lb (95.3 kg)   06/14/21 210 lb (95.3 kg)     Appearance: Awake, alert, mild acute respiratory distress. Patient appears slightly confused and has restraints in his legs and left upper extremity. Skin: Intact, no rash  Head: Normocephalic, atraumatic  Eyes: EOMI, no conjunctival erythema  ENMT: No pharyngeal erythema, MMM, no rhinorrhea  Neck: Supple, no elevated JVP, no carotid bruits  Lungs: Scattered bilateral wheezing  Cardiac: Regular rate and rhythm, +S1S2, no murmurs apparent  Abdomen: Soft, nontender, +bowel sounds  Extremities: Moves all extremities x 4, no lower extremity edema  Neurologic: No focal motor deficits apparent, normal mood and affect  Peripheral Pulses: Intact posterior tibial pulses bilaterally    Intake/Output:    Intake/Output Summary (Last 24 hours) at 6/23/2021 0916  Last data filed at 6/23/2021 5087  Gross per 24 hour   Intake 360 ml   Output 585 ml   Net -225 ml     No intake/output data recorded.     Laboratory Tests:  Recent Labs     06/21/21  0538 06/22/21  0629 06/23/21  0756    137 135   K 4.3 4.2 4.1    98 96*   CO2 25 25 22   BUN 25* 24* 32*   CREATININE 1.3* 1.2 1.3*   GLUCOSE 186* 192* 240*   CALCIUM 9.5 9.3 9.0     Lab Results   Component Value Date    MG 1.7 06/21/2021     Recent Labs     06/21/21  0538 06/22/21  0629 06/23/21  0756   ALKPHOS 159* 150* 143*   ALT 54* 43* 38   AST 19 34 28   PROT 6.8 7.1 6.9   BILITOT 0.6 0.9 1.1   LABALBU 3.7 3.8 3.4*     Recent Labs     06/21/21  0538 06/22/21  0629 06/23/21  0756   WBC 4.4* 4.7 5.5   RBC 2.45* 2.30* 2.24*   HGB 9.1* 8.5* 8.4*   HCT 27.1* 25.2* 24.4*   .6* 109.6* 108.9*   MCH 37.1* 37.0* 37.5*   MCHC 33.6 33.7 34.4   RDW 15.1* 15.4* 15.1*    209 234   MPV 10.4 10.7 10.9     Lab Results   Component Value Date    CKTOTAL 339 (H) 06/16/2021    CKMB 2.2 06/21/2021    TROPONINI 0.03 12/22/2020    TROPONINI 0.03 12/22/2020    TROPONINI <0.01 06/13/2020     Lab Results   Component Value Date    INR 1.1 06/21/2021    INR 1.0 09/06/2016    INR 1.1 12/28/2015    PROTIME 12.4 06/21/2021    PROTIME 11.6 09/06/2016    PROTIME 12.1 12/28/2015     Lab Results   Component Value Date    TSH 2.600 06/21/2021     Lab Results   Component Value Date    LABA1C 6.7 (H) 03/11/2021     No results found for: EAG  Lab Results   Component Value Date    CHOL 144 03/11/2021    CHOL 147 12/04/2020    CHOL 146 06/15/2020     Lab Results   Component Value Date    TRIG 186 (H) 03/11/2021    TRIG 173 (H) 12/04/2020    TRIG 177 (H) 06/15/2020     Lab Results   Component Value Date    HDL 36 03/11/2021    HDL 37 12/04/2020    HDL 33 (L) 06/15/2020     Lab Results   Component Value Date    LDLCALC 71 03/11/2021    LDLCALC 75 12/04/2020    LDLCALC 86 06/15/2020     Lab Results   Component Value Date    LABVLDL 37 03/11/2021    LABVLDL 35 12/04/2020    LABVLDL 44 03/07/2019     Lab Results   Component Value Date    CHOLHDLRATIO 4.4 06/15/2020    CHOLHDLRATIO 3.6 12/06/2019    CHOLHDLRATIO 3.8 09/20/2019       Cardiac Tests:  Telemetry findings reviewed: SR at rate 100s no new tachy/bradyarrhythmias overnight     EKG: Normal sinus rhythm right bundle branch block, left axis deviation, abnormal EKG.      Vitals and labs were reviewed: As above blood pressure 120/70 rate of 104 sats 94% on room air      Labs-BUN/creatinine 25/1.3>> 24/one-point proBNP 4623 high-sensitivity troponin 71 AST/ALT 19/54 H&H 9.1/27. 1     1. 11/7/1994 [facility] Dr Layla Chew: PTCA of LCx--> reduction to 25%s stenosis  2. 2006 Nonischemic stress, NWM.  3. 6/2020 Lexiscan MPS: Non ischemic  4. 11/2020 TTE Dr Manuel Ariza: EF 60-65%. Mild AS HANNAH 1.9. Moderately dilated LA.     Cardiac catheterization-6/22/2021: Severe multivessel CAD, full report is pending    Assessment:  · NSTEMI>> multivessel CAD  · Mild decomp heart failure mostly HFpEF,   · Bronchospasm  · History of CAD status post PCI to left circumflex artery-November 1994, diffuse moderate disease throughout LAD territory and chronic total occlusion of the RCA. · CKD stage III  · Hypertension, well controlled  · Hyperlipidemia on statin  · Chronic right bundle branch block  · Severe hearing loss  · History of LIZZ secondary to ACE inhibitor therapy  · Former smoker with 120-pack-year history of smoking quit in 1981  · Chronic anemia  · CKD stage III, creatinine 1.3>> 1.2>> 1.3        Plan:  · Discussed with his son and also with Dr. Quiroga Files and will plan for high risk PCI to circumflex and possibly to LAD on Friday. In the meantime, we will treat him up. · Continue diuretics with low-dose Lasix 20 mg IV daily with close monitoring of renal functions. .  · We will start him on duo nebs for bronchospasm, check chest x-ray  · Labs for today are pending. · Continue nitroglycerin paste 0.5 inches every 6 hours  · Limited echo to assess LV function is pending. · continue him on O2  · Continue home medications including aspirin 81 daily Norvasc, atorvastatin and metoprolol        Fredy Huitron MD., Fanny Leach.   [facility]) Cardiology

## 2021-06-23 NOTE — PROGRESS NOTES
Occupational Therapy  OCCUPATIONAL THERAPY INITIAL EVALUATION     [address] [address] [facility]  [address] [place] [address]      Date:2021                                                Patient Name: Washington Pearson  MRN: [id number]  : 1940  Room: [address] [place] [address]    Evaluating OT: Daniel Pickett OTR/L #[id number]     Referring Provider: Ruben Riley MD  Specific Provider Orders/Date: OT eval and treat 21    Diagnosis: NSTEMI   Pt admitted to hospital d/t chest pain    Surgery / Procedure: 21:  Right radial heart cath placed    Pertinent Medical History: Arthritis, BPH, CAD, CTS, Glaucoma, OA, partial blindness (left eye), HTN       Precautions:  Fall Risk, O2, TSM, 3-point restraints, TAPS, Bed Alarm, Cognition    Assessment of current deficits    [x] Functional mobility  [x]ADLs  [x] Strength               [x]Cognition    [x] Functional transfers   [x] IADLs         [x] Safety Awareness   [x]Endurance    [] Fine Coordination              [x] Balance      [] Vision/perception   []Sensation     []Gross Motor Coordination  [] ROM  [] Delirium                   [] Motor Control     OT PLAN OF CARE   OT POC based on physician orders, patient diagnosis and results of clinical assessment    Frequency/Duration: 1-3 days/wk for 2 weeks PRN   Specific OT Treatment Interventions to include:   * Instruction/training on adapted ADL techniques and AE recommendations to increase functional independence within precautions       * Training on energy conservation strategies, correct breathing pattern and techniques to improve independence/tolerance for self-care routine  * Functional transfer/mobility training/DME recommendations for increased independence, safety, and fall prevention  * Patient/Family education to increase follow through with safety techniques and functional independence  * Recommendation of environmental modifications for increased safety with functional transfers/mobility and ADLs  * Cognitive retraining/development of therapeutic activities to improve problem solving, judgement, memory, and attention for increased safety/participation in ADL/IADL tasks  * Therapeutic activities to facilitate/challenge dynamic balance, stand tolerance for increased safety and independence with ADLs  * Therapeutic activities to facilitate gross/fine motor skills for increased independence with ADLs  * Positioning to improve skin integrity, interaction with environment and functional independence    Recommended Adaptive Equipment: TBD     Pt is poor historian    Home Living: Pt is poor historian, Pt wife stated: Pt lives with his wife in a 1 story home with 2 CLAIR (1HR) to enter   Bathroom setup: walk-in shower    Equipment owned: sc, w/w, cane, BSC    Prior Level of Function: Independent with ADLs , Independent with IADLs; ambulated with cane   Driving: no   Occupation: retired [employer]    Pain Level: Pt did not state pain level (confused)  Cognition: A&O: 1/4 (self); Follows 1-2 step directions   Memory:  Poor   Sequencing:  Poor   Problem solving:  Poor   Judgement/safety:  Poor     Functional Assessment:  AM-PAC Daily Activity Raw Score: 13/24   Initial Eval Status  Date: 6/23/21 Treatment Status  Date: STGs = LTGs  Time frame: 10-14 days   Feeding Stand by Assist   Modified [place]   Grooming Stand by Assist   Modified [place]    UB Dressing Minimal Assist   Modified [place]    LB Dressing Maximal Assist   Minimal Assist    Bathing Dependent  D/t cognition  Mod Assist    Toileting Dependent   D/t cognition  Moderate Assist    Bed Mobility  Supine to sit: Moderate Assist   Sit to supine:  Moderate Assist   Supine to sit: Stand by Assist   Sit to supine: Stand by Assist    Functional Transfers Moderate Assist x2   Sit<>stand with HHA  Minimal Assist    Functional Mobility Moderate Assist   Functional side steps to [facility] with HHA  Stand by Assist    Balance Sitting: Static:  SBA    Dynamic:SBA  Standing: Mod A  Sitting:     Static:  Mod I    Dynamic:Mod I  Standing: SBA   Activity Tolerance P+  Fair   Visual/  Perceptual Glasses: yes, wfl                  Hand Dominance Right   AROM (PROM) Strength Additional Info:    RUE  WFL 4/5 good  and wfl FMC/dexterity noted during ADL tasks       LUE WFL 4/5 good  and wfl FMC/dexterity noted during ADL tasks       Hearing: WFL   Sensation:  No c/o numbness or tingling   Tone: WFL   Edema: WFL    Comments: Nursing clearance obtained prior to session. Upon arrival patient supine in bed and agreeable to OT session. Therapist educated pt on role of OT. At end of session, patient supine in bed with call light and phone within reach, all lines and tubes intact. Overall patient demonstrated decreased independence and safety during completion of ADL/functional transfer/mobility tasks. Pt would benefit from continued skilled OT to increase safety and independence with completion of ADL/IADL tasks for functional independence and quality of life. Treatment: OT treatment provided this date includes: Therapist educated pt on role of OT. Therapist facilitated bed mobility (supine<>sit, scooting), functional transfers (sit<>stands to/from bed), graded functional activities (static/dynamic sitting at EOB, static/dynamic standing, functional reaching) to address activity tolerance, and functional side steps with A to [facility] (in preparation for item retrieval tasks). Therapist provided maximal verbal/tactile/visual cueing on hand placement, sequencing, body mechanics, posture, energy conservation, and safety. Therapist facilitated ADL retraining (grooming tasks (B combing hair seated at EOB)). Therapist provided maximal verbal/tactile/visual cueing on hand placement, sequencing, body mechanics, posture, energy conservation, and safety. Therapist educated pt on energy conservation and safety.  Skilled monitoring of O2 sats, HR, and pt response throughout treatment. Rehab Potential: Good for established goals     Patient / Family Goal: not stated      Patient and/or family were instructed on functional diagnosis, prognosis/goals and OT plan of care. Demonstrated Poor understanding. Eval Complexity: Moderate complexity evaluation due extensive chart review, complex assessment of functional performance skills and complexity of POC. Time In: 1340  Time Out: 1405  Total Treatment Time: 10 minutes    Min Units   OT Eval Low 78449       OT Eval Medium 97166  X 1   OT Eval High 91535      OT Re-Eval [id number]       Therapeutic Ex 23561       Therapeutic Activities 97873  8  1   ADL/Self Care 74534  2     Orthotic Management 06531       Manual [id number]     Neuro Re-Ed 03563       Non-Billable Time          Evaluation Time additionally includes thorough review of current medical information, gathering information on past medical history/social history and prior level of function, interpretation of standardized testing/informal observation of tasks, assessment of data and development of plan of care and goals.         Radha Haley, S/OT    Adriana Salas OTR/L #[id number]

## 2021-06-24 NOTE — CARE COORDINATION
Care Coordination:  Patient for heart cath tomorrow. Will follow post op. .   Remains confused, restless. Sitter. PT/OT saw patient yesterday with  scores of 11/24 PT  13/24 OT . I [facility] following. Spoke with son regarding post discharge plan. He states his mom is frail and he can not see her being able to manage care for patient at this level . He  States the ultimate plan and decision will be his mother's. States patient was at [facility] in the past and did not adjust well. Sw placed call to wife and left VM to discuss options.

## 2021-06-24 NOTE — PROGRESS NOTES
Kaylin Hubbard MD FACP  Progress notes      CHIEF COMPLAINT: Chest pain      HISTORY OF PRESENT ILLNESS:   1021/21   78year-old  woman in the emergency room at [facility] earlier this morning  The son and a registered nurse were at bedside  Data reviewed in detail  Spoke with the ER physician in person  Patient with known history of coronary artery disease had an angioplasty many years ago for the LAD  Recently hospitalized with acute cystitis  Refused to see cardiologist on that admission  Now presents with onset of anterior chest discomfort described as a heaviness that woke him up at 5 AM  Nitroglycerin sublingual apparently relieved the pain in the ER  Troponins BNP were markedly elevated along with EKG changes suggestive of acute non-ST elevation myocardial infarction  He also reports shortness of breath even at rest  6/22/2021  Patient was seen on the floor earlier this morning  No further angina  On IV heparin infusion  Tolerating medications well  Data reviewed in detail  6/23/21  Patient was seen on the floor earlier this morning  Son was at bedside  Overnight delirium confusion requiring restraints and Haldol  Upon explaining things to the patient he seemed to understand what I was saying  Coronary angiography showing multivessel disease with severe left ventricular dysfunction  Spoke with the son at length  6/24/2021  Patient was seen on the floor earlier this morning son at bedside  Overnight events noted that included delirium and agitation  Remains confused this morning  Lower extremity restraints on  Barrera catheter intact  Data reviewed in detail  He denied chest pain  Past Medical History:    Past Medical History:   Diagnosis Date    Arthritis     bilateral knee    BPH (benign prostatic hypertrophy) 8/4/2016    CAD (coronary artery disease)     Cervical spondylosis 8/4/2016    Constipation     prune juice in am    CTS (carpal tunnel syndrome) 8/4/2016    Diabetes mellitus ([facility] [address]  DM type 2 (diabetes mellitus, type 2) ([facility] [address] 8/4/2016    Exogenous obesity 8/4/2016    Glaucoma     bilateral    History of EKG 10/14/2015 per Dr Tanya Mijares on chart.     Hypercholesterolemia     Hypertension     OA (osteoarthritis) 8/4/2016    Partial blindness     left eye    Polyp of colon 8/4/2016    PONV (postoperative nausea and vomiting)     Preoperative clearance 11/12/2015    per Dr Tanya Mijares on chart; for right tjak Dr Beth Ojeda Vitamin B12 deficiency 8/4/2016    Vitamin D deficiency 8/4/2016    Wears glasses        Past Surgical History:    Past Surgical History:   Procedure Laterality Date    CARDIAC CATHETERIZATION  06/22/2021    [place]    CATARACT REMOVAL WITH IMPLANT Bilateral     COLONOSCOPY  10/31/2013    DIAGNOSTIC CARDIAC CATH LAB PROCEDURE  1990 approximately    ? stent    EYE SURGERY Bilateral     cataract    JOINT REPLACEMENT Right 12/08/2015    SHOULDER ARTHROPLASTY Left 2000    TOTAL KNEE ARTHROPLASTY Right 11/30/2015       Medications Prior to Admission:    Medications Prior to Admission: metFORMIN (GLUCOPHAGE-XR) 500 MG extended release tablet, Take 1 tablet by mouth 3 times daily (with meals)  cefdinir (OMNICEF) 300 MG capsule, Take 1 capsule by mouth 2 times daily for 10 days  pantoprazole (PROTONIX) 40 MG tablet, TAKE 1 TABLET BY MOUTH EVERY DAY BEFORE BREAKFAST  oxybutynin (DITROPAN XL) 15 MG extended release tablet, Take 15 mg by mouth daily  valsartan (DIOVAN) 80 MG tablet, Take 1 tablet by mouth daily  amLODIPine (NORVASC) 5 MG tablet, TAKE 1 TABLET BY MOUTH EVERY DAY AT NIGHT  metoprolol tartrate (LOPRESSOR) 50 MG tablet, TAKE 1 TABLET BY MOUTH EVERY DAY  atorvastatin (LIPITOR) 40 MG tablet, TAKE 1 TABLET BY MOUTH EVERY DAY  dapagliflozin (FARXIGA) 10 MG tablet, TAKE 1 TABLET BY MOUTH EVERY DAY IN THE MORNING  vitamin E 400 UNIT capsule, Take 1 capsule by mouth daily  mirabegron (MYRBETRIQ) 25 MG TB24, Take 1 tablet by mouth three times a week Mon-Wed-& Friday  tamsulosin (FLOMAX) 0.4 MG capsule, TAKE 1 CAPSULE BY MOUTH EVERYDAY AT BEDTIME  blood glucose test strips (FREESTYLE TEST STRIPS) strip, Indications: freestyle test strips DX: E11.9 As needed. Diagnosis is E 11.9  FREESTYLE LANCETS MISC, 1 each by Does not apply route daily Indications: in AM DX: E11.9  Continuous Blood Gluc Sensor ([address] [place] [address]) MISC, 1 Units by Does not apply route 3 times daily  Continuous Blood Gluc  (FREESTYLE MAYELA READER) WALTER, 1 Units by Does not apply route 3 times daily e11.9  aspirin 81 MG EC tablet, Take 81 mg by mouth nightly  Coenzyme Q10 (CO Q-10) 100 MG CAPS, Take 100 mg by mouth nightly  Omega-3 Fatty Acids (FISH OIL) 300 MG CAPS, Take 1 capsule by mouth Daily with supper  vitamin B-12 (CYANOCOBALAMIN) 1000 MCG tablet, Take 1,000 mcg by mouth daily  Ascorbic Acid (VITAMIN C) 500 MG tablet, Take 500 mg by mouth daily  latanoprost (XALATAN) 0.005 % ophthalmic solution, Place 1 drop into the right eye nightly   vitamin D (ERGOCALCIFEROL) 1.25 MG (59050 UT) CAPS capsule, Take 1 capsule by mouth once a week for 12 doses (Patient taking differently: Take 50,000 Units by mouth once a week - Takes on Wednesday)    Allergies:    Ace inhibitors; Anesthetics, amide; and Vicodin [hydrocodone-acetaminophen]    Social History:    reports that he quit smoking about 40 years ago. His smoking use included cigars. He started smoking about 67 years ago. He has a 120.00 pack-year smoking history. He quit smokeless tobacco use about 5 years ago. He reports that he does not drink alcohol and does not use drugs. Family History:   family history includes Heart Disease in his mother; Heart Disease (age of onset: 52) in his father.     REVIEW OF SYSTEMS:  As above in the HPI, otherwise negative    PHYSICAL EXAM:    Vitals:  /64   Pulse 89   Temp 97.8 °F (36.6 °C) (Oral)   Resp 20   Ht 5' 9\" (1.753 m)   Wt 246 lb 9.6 oz (111.9 kg)   SpO2 95%   BMI 36.42 kg/m²     General:  Awake, alert, oriented x1. Well developed, well nourished, well groomed. Morbidly obese  In mild to moderate distress  HEENT:  Normocephalic, atraumatic. Pupils equal, round, reactive to light. No scleral icterus. No conjunctival injection. .  No nasal discharge. Neck:  Supple  Heart:  RRR, no murmurs, gallops, rubs  Lungs: Scattered rails in the posterior lung fields noted although chest x-ray reveals no pulmonary edema  Abdomen:   Bowel sounds present, soft, nontender, no masses, no organomegaly, no peritoneal signs  Extremities:  No clubbing, cyanosis, or edema  Skin:  Warm and dry, no open lesions or rash  Neuro:  Cranial nerves 2-12 intact, no focal deficits  Breast: deferred  Rectal: deferred  Genitalia:  deferred    LABS: Data reviewed in detail      ASSESSMENT:      Active Problems:    NSTEMI (non-ST elevated myocardial infarction) ([facility] [address]  Acute CHF likely diastolic based on ischemic event  Multivessel coronary artery disease noted on angiography with a LV dysfunction  Acute delirium  Patient Active Problem List   Diagnosis    Hypertension    Hypercholesterolemia    CAD (coronary artery disease)    Benign prostatic hyperplasia    CTS (carpal tunnel syndrome)    Cervical spondylosis    DM type 2 (diabetes mellitus, type 2) (HCC)    Exogenous obesity    OA (osteoarthritis)    Polyp of colon    Vitamin B12 deficiency    Vitamin D deficiency    Morbidly obese (HCC)    Asthmatic bronchitis, mild intermittent, uncomplicated    Chest pain    Generalized weakness    NSTEMI (non-ST elevated myocardial infarction) ([facility] [address]           PLAN:  Spoke with cardiology this morning  Contemplating stenting of circumflex  Vistaril at bedtime  Reviewed in detail with the son    Jeremy Brown MD  9:18 AM  6/24/2021

## 2021-06-24 NOTE — PROGRESS NOTES
INPATIENT CARDIOLOGY FOLLOW-UP    Name: Alvarado Russo    Age: 78 y.o. Date of Admission: 6/21/2021  5:16 AM    Date of Service: 6/24/2021    Chief Complaint: Follow-up for NSTEMI    Interim History:  Apparently, patient became confused overnight and required restraints. He is feeling better compared to yesterday and still has some wheezing. Patient's son is at the bedside. Dr. Noe Weems from CT surgery evaluated the patient and final input noted and not a candidate for surgery  As per his son, Currently with no complaints of CP, palpitations, dizziness, or lightheadedness. SR on telemetry. Review of Systems:   Cardiac: As per HPI  General: No fever, chills  Pulmonary: As per HPI  HEENT: No visual disturbances, difficult swallowing  GI: No nausea, vomiting  Endocrine: No thyroid disease or DM  Musculoskeletal: DOWELL x 4, no focal motor deficits  Skin: Intact, no rashes  Neuro/Psych: No headache or seizures    Problem List:  Patient Active Problem List   Diagnosis    Hypertension    Hypercholesterolemia    CAD (coronary artery disease)    Benign prostatic hyperplasia    CTS (carpal tunnel syndrome)    Cervical spondylosis    DM type 2 (diabetes mellitus, type 2) (HCC)    Exogenous obesity    OA (osteoarthritis)    Polyp of colon    Vitamin B12 deficiency    Vitamin D deficiency    Morbidly obese ([address]    Asthmatic bronchitis, mild intermittent, uncomplicated    Chest pain    Generalized weakness    NSTEMI (non-ST elevated myocardial infarction) ([address]       Allergies: Allergies   Allergen Reactions    Ace Inhibitors      LIZZ    Anesthetics, Amide      Hospitalization after use.     Vicodin [Hydrocodone-Acetaminophen] Other (See Comments)     Abdominal pain constipation        Current Medications:  Current Facility-Administered Medications   Medication Dose Route Frequency Provider Last Rate Last Admin    furosemide (LASIX) injection 40 mg  40 mg Intravenous Once Florida Santos MD  hydrOXYzine (VISTARIL) capsule 50 mg  50 mg Oral Nightly Antonio Tee MD        0.9 % sodium chloride infusion   Intravenous Continuous Rodríguez Quinones MD        ipratropium-albuterol (DUONEB) nebulizer solution 1 ampule  1 ampule Inhalation Q6H Rodríguez Quinones MD   1 ampule at 06/24/21 0454    sodium chloride flush 0.9 % injection 5-40 mL  5-40 mL Intravenous 2 times per day Josefa Thacker MD   10 mL at 06/23/21 2010    sodium chloride flush 0.9 % injection 5-40 mL  5-40 mL Intravenous PRN Josefa Thacker MD        0.9 % sodium chloride infusion  25 mL Intravenous PRN Josefa Thacker MD        [Held by provider] furosemide (LASIX) injection 20 mg  20 mg Intravenous Daily Josefa Thacker MD   20 mg at 06/23/21 0912    carvedilol (COREG) tablet 6.25 mg  6.25 mg Oral BID WC Josefa Thacker MD   6.25 mg at 06/23/21 1642    aspirin EC tablet 81 mg  81 mg Oral Daily Josefa Thacker MD   81 mg at 06/23/21 0913    nitroglycerin (NITRO-BID) 2 % ointment 0.5 inch  0.5 inch Topical 4 times per day Josefa Thacker MD   0.5 inch at 06/24/21 0545    atorvastatin (LIPITOR) tablet 40 mg  40 mg Oral Daily Josefa Thacker MD   40 mg at 06/23/21 0913    cefdinir (OMNICEF) capsule 300 mg  300 mg Oral BID Josefa Thacker MD   300 mg at 06/23/21 2010    latanoprost (XALATAN) 0.005 % ophthalmic solution 1 drop  1 drop Right Eye Nightly Josefa Thacker MD   1 drop at 06/21/21 2020    pantoprazole (PROTONIX) tablet 40 mg  40 mg Oral QAM AC Josefa Thacker MD   40 mg at 06/24/21 0638    tamsulosin (FLOMAX) capsule 0.4 mg  0.4 mg Oral Daily Josefa Thacker MD   0.4 mg at 06/23/21 0912    vitamin D (ERGOCALCIFEROL) capsule 50,000 Units  50,000 Units Oral Weekly Josefa Thacker MD   50,000 Units at 06/23/21 0913    insulin lispro (HUMALOG) injection vial 0-12 Units  0-12 Units Subcutaneous TID WC Josefa Thacker MD   2 Units at 06/24/21 0639    insulin lispro (HUMALOG) injection vial 0-6 Units  0-6 Units Subcutaneous Nightly Josefa Thacker MD   2 Units at 06/23/21 2011    glucose (GLUTOSE) 40 % oral gel 15 g  15 g Oral PRN Ruben Riley MD        dextrose 50 % IV solution  12.5 g Intravenous PRN Ruben Riley MD        glucagon (rDNA) injection 1 mg  1 mg Intramuscular PRN Ruben Riley MD        dextrose 5 % solution  100 mL/hr Intravenous PRN Ruben Riley MD        acetaminophen (TYLENOL) tablet 500 mg  500 mg Oral Q4H PRN Ruben Riley MD   500 mg at 06/22/21 2155      sodium chloride      sodium chloride      dextrose         Physical Exam:  /64   Pulse 89   Temp 97.8 °F (36.6 °C) (Oral)   Resp 20   Ht 5' 9\" (1.753 m)   Wt 246 lb 9.6 oz (111.9 kg)   SpO2 95%   BMI 36.42 kg/m²   Wt Readings from Last 3 Encounters:   06/24/21 246 lb 9.6 oz (111.9 kg)   06/15/21 210 lb (95.3 kg)   06/14/21 210 lb (95.3 kg)     Appearance: Awake, alert, mild acute respiratory distress. Patient appears slightly confused but responding appropriately and has restraints on his legs . Skin: Intact, no rash  Head: Normocephalic, atraumatic  Eyes: EOMI, no conjunctival erythema  ENMT: No pharyngeal erythema, MMM, no rhinorrhea  Neck: Supple, no elevated JVP, no carotid bruits  Lungs: Scattered bilateral wheezing  Cardiac: Regular rate and rhythm, +S1S2, no murmurs apparent  Abdomen: Soft, nontender, +bowel sounds  Extremities: Moves all extremities x 4, no lower extremity edema  Neurologic: No focal motor deficits apparent, normal mood and affect  Peripheral Pulses: Intact posterior tibial pulses bilaterally    Intake/Output:    Intake/Output Summary (Last 24 hours) at 6/24/2021 0920  Last data filed at 6/24/2021 0439  Gross per 24 hour   Intake 420 ml   Output 500 ml   Net -80 ml     No intake/output data recorded.     Laboratory Tests:  Recent Labs     06/22/21  0629 06/23/21  0756 06/24/21  0646    135 136   K 4.2 4.1 4.7   CL 98 96* 98   CO2 25 22 20*   BUN 24* 32* 44*   CREATININE 1.2 1.3* 1.5*   GLUCOSE 192* 240* 163*   CALCIUM 9.3 9.0 8.6     Lab Results   Component Value Date    MG 1.7 06/21/2021     Recent Labs     06/22/21  0629 06/23/21  0756 06/24/21  0646   ALKPHOS 150* 143* 144*   ALT 43* 38 93*   AST 34 28 86*   PROT 7.1 6.9 6.7   BILITOT 0.9 1.1 0.9   LABALBU 3.8 3.4* 3.1*     Recent Labs     06/22/21  0629 06/23/21  0756   WBC 4.7 5.5   RBC 2.30* 2.24*   HGB 8.5* 8.4*   HCT 25.2* 24.4*   .6* 108.9*   MCH 37.0* 37.5*   MCHC 33.7 34.4   RDW 15.4* 15.1*    234   MPV 10.7 10.9     Lab Results   Component Value Date    CKTOTAL 339 (H) 06/16/2021    CKMB 2.2 06/21/2021    TROPONINI 0.03 12/22/2020    TROPONINI 0.03 12/22/2020    TROPONINI <0.01 06/13/2020     Lab Results   Component Value Date    INR 1.1 06/21/2021    INR 1.0 09/06/2016    INR 1.1 12/28/2015    PROTIME 12.4 06/21/2021    PROTIME 11.6 09/06/2016    PROTIME 12.1 12/28/2015     Lab Results   Component Value Date    TSH 2.600 06/21/2021     Lab Results   Component Value Date    LABA1C 6.7 (H) 03/11/2021     No results found for: EAG  Lab Results   Component Value Date    CHOL 144 03/11/2021    CHOL 147 12/04/2020    CHOL 146 06/15/2020     Lab Results   Component Value Date    TRIG 186 (H) 03/11/2021    TRIG 173 (H) 12/04/2020    TRIG 177 (H) 06/15/2020     Lab Results   Component Value Date    HDL 36 03/11/2021    HDL 37 12/04/2020    HDL 33 (L) 06/15/2020     Lab Results   Component Value Date    LDLCALC 71 03/11/2021    LDLCALC 75 12/04/2020    LDLCALC 86 06/15/2020     Lab Results   Component Value Date    LABVLDL 37 03/11/2021    LABVLDL 35 12/04/2020    LABVLDL 44 03/07/2019     Lab Results   Component Value Date    CHOLHDLRATIO 4.4 06/15/2020    CHOLHDLRATIO 3.6 12/06/2019    CHOLHDLRATIO 3.8 09/20/2019       Cardiac Tests:  Telemetry findings reviewed: SR at rate 100s no new tachy/bradyarrhythmias overnight     EKG: Normal sinus rhythm right bundle branch block, left axis deviation, abnormal EKG.      Vitals and labs were reviewed: As above blood pressure 120/70 rate of 104 sats 94% on room air      Labs-BUN/creatinine 25/1.3>> 24/one-point proBNP 4623 high-sensitivity troponin 71 AST/ALT 19/54 H&H 9.1/27. 1     1. 11/7/1994 [facility] Dr Ronnell Reagan: PTCA of LCx--> reduction to 25%s stenosis  2. 2006 Nonischemic stress, NWM.  3. 6/2020 Lexiscan MPS: Non ischemic  4. 11/2020 TTE Dr Fawn Grimm: EF 60-65%. Mild AS HANNAH 1.9. Moderately dilated LA.     Cardiac catheterization-6/22/2021: Severe multivessel CAD, full report is pending    Assessment:  · NSTEMI>> multivessel CAD  · Mild decomp heart failure mostly HFpEF,  Stable. · Bronchospasm, improving. · History of CAD status post PCI to left circumflex artery-November 1994, diffuse moderate disease throughout LAD territory and chronic total occlusion of the RCA. · CKD stage III  · Hypertension, well controlled  · Hyperlipidemia on statin  · Chronic right bundle branch block  · Severe hearing loss  · History of LIZZ secondary to ACE inhibitor therapy  · Former smoker with 120-pack-year history of smoking quit in 1981  · Chronic anemia  · CKD stage III, creatinine 1.3>> 1.2>> 1.3>>1.5  · Confusion mostly from sedation with an underlying mild cognitive dysfunction.         Plan:  · Patient is scheduled  for high risk PCI to circumflex and possibly to LAD on Friday. · Hold Lasix today since slight bump in creatinine level. We will start him on low-dose IV fluids 75 mill per hour x24 hours. · Bronchospasm is better we will continue nebs every 6 hours  · Labs are reviewed. · Continue nitroglycerin paste 0.5 inches every 6 hours  · Echo results were reviewed LVEF is lower than 4045%. · continue him on O2  · Continue home medications including aspirin 81 daily Norvasc, atorvastatin and metoprolol    Discussed with his son who was at the bedside and also discussed with patient's primary care provider Dr. Irina Witt. Kishore Anaya MD., Halie Breaux.   [facility]) Cardiology

## 2021-06-24 NOTE — PROGRESS NOTES
Removed left wrist restraint, patient pulled out oxygen and tried to pull out bravo catheter. Restraint reapplied. Patient is resting. Family at bedside.

## 2021-06-24 NOTE — PROGRESS NOTES
Pt remains in non-violent soft restraints at left wrist, bilateral ankle due to pt continuing to pull at lines and tubing such as oxygen, iv heplock, tele monitor, and pt is not following commands, call light within reach and bed at lowest level with no clinical s/s of any distress

## 2021-06-24 NOTE — PLAN OF CARE
Problem: Falls - Risk of:  Goal: Will remain free from falls  Description: Will remain free from falls  Outcome: Met This Shift  Goal: Absence of physical injury  Description: Absence of physical injury  Outcome: Met This Shift     Problem: Pain:  Goal: Pain level will decrease  Description: Pain level will decrease  Outcome: Met This Shift  Goal: Control of acute pain  Description: Control of acute pain  Outcome: Met This Shift  Goal: Control of chronic pain  Description: Control of chronic pain  Outcome: Met This Shift     Problem: Non-Violent Restraints  Goal: Removal from restraints as soon as assessed to be safe  6/23/2021 1617 by Shaun Ribera RN  Outcome: Met This Shift  Goal: No harm/injury to patient while restraints in use  6/23/2021 1617 by Shaun Ribera RN  Outcome: Met This Shift  Goal: Patient's dignity will be maintained  6/23/2021 1617 by Shaun Ribera RN  Outcome: Met This Shift

## 2021-06-24 NOTE — PROGRESS NOTES
Pt remains in non-violent soft restraints at left wrist, bilateral ankle due to pt continuing to pull at lines and tubing such as oxygen, iv heplock, tele monitor, and pt is not following commands, call light within reach

## 2021-06-24 NOTE — PLAN OF CARE
Problem: Falls - Risk of:  Goal: Will remain free from falls  Description: Will remain free from falls  6/24/2021 1836 by Amairani Howell RN  Outcome: Met This Shift     Problem: Falls - Risk of:  Goal: Absence of physical injury  Description: Absence of physical injury  6/24/2021 1836 by Amairani Howell RN  Outcome: Met This Shift     Problem: Pain:  Goal: Pain level will decrease  Description: Pain level will decrease  6/24/2021 1836 by Amairani Howell RN  Outcome: Met This Shift     Problem: Pain:  Goal: Control of acute pain  Description: Control of acute pain  6/24/2021 1836 by Amairani Howell RN  Outcome: Met This Shift     Problem: Pain:  Goal: Control of chronic pain  Description: Control of chronic pain  6/24/2021 1836 by Amairani Howell RN  Outcome: Met This Shift     Problem: Non-Violent Restraints  Goal: No harm/injury to patient while restraints in use  6/24/2021 1836 by Amairani Howell RN  Outcome: Met This Shift     Problem: Non-Violent Restraints  Goal: Patient's dignity will be maintained  6/24/2021 1836 by Amairani Howell RN  Outcome: Met This Shift     Problem: Skin Integrity:  Goal: Will show no infection signs and symptoms  Description: Will show no infection signs and symptoms  6/24/2021 1836 by Amairani Howell RN  Outcome: Met This Shift     Problem: Skin Integrity:  Goal: Absence of new skin breakdown  Description: Absence of new skin breakdown  6/24/2021 1836 by Amairani Howell RN  Outcome: Met This Shift

## 2021-06-24 NOTE — PROGRESS NOTES
Informed Dr. Shabbir Donaldson that pt has not slept during my shift continues to pull at equipment with right hand with 3 limb restraints,  and pt is now intermittently yelling, pt denies any pain or discomfort pt remains confused.  Requested haldol awaiting response from MD

## 2021-06-24 NOTE — PLAN OF CARE
Problem: Falls - Risk of:  Goal: Will remain free from falls  Description: Will remain free from falls  6/24/2021 0714 by Yasir Lu RN  Outcome: Met This Shift  6/23/2021 2319 by Yasir Lu RN  Outcome: Met This Shift  Goal: Absence of physical injury  Description: Absence of physical injury  6/24/2021 0714 by Yasir Lu RN  Outcome: Met This Shift  6/23/2021 2319 by Yasir Lu RN  Outcome: Met This Shift     Problem: Pain:  Goal: Pain level will decrease  Description: Pain level will decrease  6/24/2021 0714 by Yasir Lu RN  Outcome: Met This Shift  6/23/2021 2319 by Yasir Lu RN  Outcome: Met This Shift  Goal: Control of acute pain  Description: Control of acute pain  6/24/2021 0714 by Yasir Lu RN  Outcome: Met This Shift  6/23/2021 2319 by Yasir Lu RN  Outcome: Met This Shift  Goal: Control of chronic pain  Description: Control of chronic pain  6/24/2021 0714 by Yasir Lu RN  Outcome: Met This Shift  6/23/2021 2319 by Yasir Lu RN  Outcome: Met This Shift     Problem: Non-Violent Restraints  Goal: Removal from restraints as soon as assessed to be safe  Outcome: Met This Shift  Goal: No harm/injury to patient while restraints in use  Outcome: Met This Shift  Goal: Patient's dignity will be maintained  Outcome: Met This Shift     Problem: Skin Integrity:  Goal: Will show no infection signs and symptoms  Description: Will show no infection signs and symptoms  Outcome: Met This Shift  Goal: Absence of new skin breakdown  Description: Absence of new skin breakdown  Outcome: Met This Shift

## 2021-06-25 NOTE — PROGRESS NOTES
Woodford Denver, MD FACP  Progress notes      CHIEF COMPLAINT: Chest pain      HISTORY OF PRESENT ILLNESS:   1021/21   78year-old  woman in the emergency room at [facility] earlier this morning  The son and a registered nurse were at bedside  Data reviewed in detail  Spoke with the ER physician in person  Patient with known history of coronary artery disease had an angioplasty many years ago for the LAD  Recently hospitalized with acute cystitis  Refused to see cardiologist on that admission  Now presents with onset of anterior chest discomfort described as a heaviness that woke him up at 5 AM  Nitroglycerin sublingual apparently relieved the pain in the ER  Troponins BNP were markedly elevated along with EKG changes suggestive of acute non-ST elevation myocardial infarction  He also reports shortness of breath even at rest  6/22/2021  Patient was seen on the floor earlier this morning  No further angina  On IV heparin infusion  Tolerating medications well  Data reviewed in detail  6/23/21  Patient was seen on the floor earlier this morning  Son was at bedside  Overnight delirium confusion requiring restraints and Haldol  Upon explaining things to the patient he seemed to understand what I was saying  Coronary angiography showing multivessel disease with severe left ventricular dysfunction  Spoke with the son at length  6/24/2021  Patient was seen on the floor earlier this morning son at bedside  Overnight events noted that included delirium and agitation  Remains confused this morning  Lower extremity restraints on  Barrera catheter intact  Data reviewed in detail  He denied chest pain  6/25/21  Patient was seen on the floor earlier this afternoon  Scheduled for stenting of the coronary  Pain-free  Confused disoriented  In restraints  Past Medical History:    Past Medical History:   Diagnosis Date    Arthritis     bilateral knee    BPH (benign prostatic hypertrophy) 8/4/2016    CAD (coronary artery disease)     Cervical spondylosis 8/4/2016    Constipation     prune juice in am    CTS (carpal tunnel syndrome) 8/4/2016    Diabetes mellitus [facility]     DM type 2 (diabetes mellitus, type 2) ([facility] 8/4/2016    Exogenous obesity 8/4/2016    Glaucoma     bilateral    History of EKG 10/14/2015 per Dr Irina Witt on chart.     Hypercholesterolemia     Hypertension     OA (osteoarthritis) 8/4/2016    Partial blindness     left eye    Polyp of colon 8/4/2016    PONV (postoperative nausea and vomiting)     Preoperative clearance 11/12/2015    per Dr Irina Witt on chart; for right tjak Dr Steven Keene Vitamin B12 deficiency 8/4/2016    Vitamin D deficiency 8/4/2016    Wears glasses        Past Surgical History:    Past Surgical History:   Procedure Laterality Date    CARDIAC CATHETERIZATION  06/22/2021    [place]    CATARACT REMOVAL WITH IMPLANT Bilateral     COLONOSCOPY  10/31/2013    DIAGNOSTIC CARDIAC CATH LAB PROCEDURE  1990 approximately    ? stent    EYE SURGERY Bilateral     cataract    JOINT REPLACEMENT Right 12/08/2015    SHOULDER ARTHROPLASTY Left 2000    TOTAL KNEE ARTHROPLASTY Right 11/30/2015       Medications Prior to Admission:    Medications Prior to Admission: metFORMIN (GLUCOPHAGE-XR) 500 MG extended release tablet, Take 1 tablet by mouth 3 times daily (with meals)  cefdinir (OMNICEF) 300 MG capsule, Take 1 capsule by mouth 2 times daily for 10 days  pantoprazole (PROTONIX) 40 MG tablet, TAKE 1 TABLET BY MOUTH EVERY DAY BEFORE BREAKFAST  oxybutynin (DITROPAN XL) 15 MG extended release tablet, Take 15 mg by mouth daily  valsartan (DIOVAN) 80 MG tablet, Take 1 tablet by mouth daily  amLODIPine (NORVASC) 5 MG tablet, TAKE 1 TABLET BY MOUTH EVERY DAY AT NIGHT  metoprolol tartrate (LOPRESSOR) 50 MG tablet, TAKE 1 TABLET BY MOUTH EVERY DAY  atorvastatin (LIPITOR) 40 MG tablet, TAKE 1 TABLET BY MOUTH EVERY DAY  dapagliflozin (FARXIGA) 10 MG tablet, TAKE 1 TABLET BY MOUTH EVERY DAY IN THE MORNING  vitamin E 400 UNIT capsule, Take 1 capsule by mouth daily  mirabegron (MYRBETRIQ) 25 MG TB24, Take 1 tablet by mouth three times a week Mon-Wed-& Friday  tamsulosin (FLOMAX) 0.4 MG capsule, TAKE 1 CAPSULE BY MOUTH EVERYDAY AT BEDTIME  blood glucose test strips (FREESTYLE TEST STRIPS) strip, Indications: freestyle test strips DX: E11.9 As needed. Diagnosis is E 11.9  FREESTYLE LANCETS MISC, 1 each by Does not apply route daily Indications: in AM DX: E11.9  Continuous Blood Gluc Sensor ([address] [facility]) MISC, 1 Units by Does not apply route 3 times daily  Continuous Blood Gluc  (FREESTYLE MAYELA READER) WALTER, 1 Units by Does not apply route 3 times daily e11.9  aspirin 81 MG EC tablet, Take 81 mg by mouth nightly  Coenzyme Q10 (CO Q-10) 100 MG CAPS, Take 100 mg by mouth nightly  Omega-3 Fatty Acids (FISH OIL) 300 MG CAPS, Take 1 capsule by mouth Daily with supper  vitamin B-12 (CYANOCOBALAMIN) 1000 MCG tablet, Take 1,000 mcg by mouth daily  Ascorbic Acid (VITAMIN C) 500 MG tablet, Take 500 mg by mouth daily  latanoprost (XALATAN) 0.005 % ophthalmic solution, Place 1 drop into the right eye nightly   vitamin D (ERGOCALCIFEROL) 1.25 MG (12198 UT) CAPS capsule, Take 1 capsule by mouth once a week for 12 doses (Patient taking differently: Take 50,000 Units by mouth once a week - Takes on Wednesday)    Allergies:    Ace inhibitors; Anesthetics, amide; and Vicodin [hydrocodone-acetaminophen]    Social History:    reports that he quit smoking about 40 years ago. His smoking use included cigars. He started smoking about 67 years ago. He has a 120.00 pack-year smoking history. He quit smokeless tobacco use about 5 years ago. He reports that he does not drink alcohol and does not use drugs. Family History:   family history includes Heart Disease in his mother; Heart Disease (age of onset: 52) in his father.     REVIEW OF SYSTEMS:  As above in the HPI, otherwise negative    PHYSICAL EXAM:    Vitals:  /61   Pulse 76   Temp 96.8 °F (36 °C) (Temporal)   Resp 21   Ht 5' 9\" (1.753 m)   Wt 246 lb 9.6 oz (111.9 kg)   SpO2 100%   BMI 36.42 kg/m²     General:  Awake, alert, disoriented. Well developed, well nourished, well groomed. Morbidly obese  In mild to moderate distress  HEENT:  Normocephalic, atraumatic. Pupils equal, round, reactive to light. No scleral icterus. No conjunctival injection. .  No nasal discharge. Neck:  Supple  Heart:  RRR, no murmurs, gallops, rubs  Lungs: Scattered rails in the posterior lung fields noted although chest x-ray reveals no pulmonary edema  Abdomen:   Bowel sounds present, soft, nontender, no masses, no organomegaly, no peritoneal signs  Extremities:  No clubbing, cyanosis, or edema  Skin:  Warm and dry, no open lesions or rash  Neuro:  Cranial nerves 2-12 intact, no focal deficits  Breast: deferred  Rectal: deferred  Genitalia:  deferred    LABS: Data reviewed in detail      ASSESSMENT:      Active Problems:    NSTEMI (non-ST elevated myocardial infarction) ([facility] [address]  Acute CHF likely diastolic based on ischemic event  Multivessel coronary artery disease noted on angiography with a LV dysfunction  Acute delirium  Patient Active Problem List   Diagnosis    Hypertension    Hypercholesterolemia    CAD (coronary artery disease)    Benign prostatic hyperplasia    CTS (carpal tunnel syndrome)    Cervical spondylosis    DM type 2 (diabetes mellitus, type 2) (HCC)    Exogenous obesity    OA (osteoarthritis)    Polyp of colon    Vitamin B12 deficiency    Vitamin D deficiency    Morbidly obese (HCC)    Asthmatic bronchitis, mild intermittent, uncomplicated    Chest pain    Generalized weakness    NSTEMI (non-ST elevated myocardial infarction) ([address]           PLAN:  Spoke with cardiology this morning  Contemplating stenting of circumflex  Vistaril at bedtime  Reviewed in detail with the wife at bedside     Kerry Floyd MD  3:48 PM  6/25/2021

## 2021-06-25 NOTE — PLAN OF CARE
Problem: Falls - Risk of:  Goal: Will remain free from falls  Description: Will remain free from falls  6/25/2021 0603 by Katlin Mark RN  Outcome: Met This Shift  6/24/2021 2246 by Katlin Mark RN  Outcome: Met This Shift  6/24/2021 1836 by Amairani Howell RN  Outcome: Met This Shift  Goal: Absence of physical injury  Description: Absence of physical injury  6/25/2021 0603 by Katlin Mark RN  Outcome: Met This Shift  6/24/2021 2246 by Katlin Mark RN  Outcome: Met This Shift  6/24/2021 1836 by Amairani Howell RN  Outcome: Met This Shift     Problem: Pain:  Goal: Pain level will decrease  Description: Pain level will decrease  6/25/2021 0603 by Katlin Mark RN  Outcome: Met This Shift  6/24/2021 2246 by Katlin Mark RN  Outcome: Met This Shift  6/24/2021 1836 by Amairani Howell RN  Outcome: Met This Shift  Goal: Control of acute pain  Description: Control of acute pain  6/25/2021 0603 by Katlin Mark RN  Outcome: Met This Shift  6/24/2021 2246 by Katlin Mark RN  Outcome: Met This Shift  6/24/2021 1836 by Amairani Howell RN  Outcome: Met This Shift  Goal: Control of chronic pain  Description: Control of chronic pain  6/25/2021 0603 by Katlin Mark RN  Outcome: Met This Shift  6/24/2021 2246 by Katlin Mark RN  Outcome: Met This Shift  6/24/2021 1836 by Amairani Howell RN  Outcome: Met This Shift     Problem: Non-Violent Restraints  Goal: Removal from restraints as soon as assessed to be safe  6/25/2021 0603 by Katlin Mark RN  Outcome: Met This Shift  6/24/2021 2246 by Katlin Mark RN  Outcome: Met This Shift  Goal: No harm/injury to patient while restraints in use  6/25/2021 0603 by Katlin Mark RN  Outcome: Met This Shift  6/24/2021 2246 by Katlin Mark RN  Outcome: Met This Shift  6/24/2021 1836 by Amairani Howell RN  Outcome: Met This Shift  Goal: Patient's dignity will be maintained  6/25/2021 0603 by Geraldine Quiroga Donna Ashraf RN  Outcome: Met This Shift  6/24/2021 2246 by Katlin Mark RN  Outcome: Met This Shift  6/24/2021 1836 by Amairani Howell RN  Outcome: Met This Shift     Problem: Skin Integrity:  Goal: Will show no infection signs and symptoms  Description: Will show no infection signs and symptoms  6/25/2021 0603 by Katlin Mark RN  Outcome: Met This Shift  6/24/2021 2246 by Katlin Mark RN  Outcome: Met This Shift  6/24/2021 1836 by Amairani Howell RN  Outcome: Met This Shift  Goal: Absence of new skin breakdown  Description: Absence of new skin breakdown  6/25/2021 0603 by Katlin Mark RN  Outcome: Met This Shift  6/24/2021 2246 by Katlin Mark RN  Outcome: Met This Shift  6/24/2021 1836 by Amairani Howell RN  Outcome: Met This Shift

## 2021-06-25 NOTE — PROGRESS NOTES
Patient was brought to the Cath Lab for staged PCI to his left circumflex artery and LAD, after getting left femoral artery access for Impella and right femoral access for coronary intervention, patient respiratory status started deteriorating fast, patient was put on 100% oxygen, was not able to maintain his oxygen saturation more than 88%, patient was started on BiPAP, anesthesia was called for intubation, by that time I did discuss the situation with his son, son stated that patient did not wish to be put on a breathing machine, so we canceled intubation, patient was put on BiPAP, will be sent up to his room.   Prognosis is poor

## 2021-06-25 NOTE — PLAN OF CARE
Problem: Falls - Risk of:  Goal: Will remain free from falls  Description: Will remain free from falls  6/25/2021 0829 by Nora Harris RN  Outcome: Met This Shift  6/25/2021 0603 by Nora Harris RN  Outcome: Met This Shift  6/24/2021 2246 by Nora Harris RN  Outcome: Met This Shift  6/24/2021 1836 by Km Wood RN  Outcome: Met This Shift  Goal: Absence of physical injury  Description: Absence of physical injury  6/25/2021 0829 by Nora Harris RN  Outcome: Met This Shift  6/25/2021 0603 by Nora Harris RN  Outcome: Met This Shift  6/24/2021 2246 by Nora Harris RN  Outcome: Met This Shift  6/24/2021 1836 by Km Wood RN  Outcome: Met This Shift     Problem: Pain:  Goal: Pain level will decrease  Description: Pain level will decrease  6/25/2021 0829 by Nora Harris RN  Outcome: Met This Shift  6/25/2021 0603 by Nora Harris RN  Outcome: Met This Shift  6/24/2021 2246 by Nora Harris RN  Outcome: Met This Shift  6/24/2021 1836 by Km Wood RN  Outcome: Met This Shift  Goal: Control of acute pain  Description: Control of acute pain  6/25/2021 0829 by Nora Harris RN  Outcome: Met This Shift  6/25/2021 0603 by Nora Harris RN  Outcome: Met This Shift  6/24/2021 2246 by Nora Harris RN  Outcome: Met This Shift  6/24/2021 1836 by Km Wood RN  Outcome: Met This Shift  Goal: Control of chronic pain  Description: Control of chronic pain  6/25/2021 0829 by Nora Harris RN  Outcome: Met This Shift  6/25/2021 0603 by Nora Harris RN  Outcome: Met This Shift  6/24/2021 2246 by Nora Harris RN  Outcome: Met This Shift  6/24/2021 1836 by Km Wood RN  Outcome: Met This Shift     Problem: Non-Violent Restraints  Goal: Removal from restraints as soon as assessed to be safe  6/25/2021 0829 by Nora Harris RN  Outcome: Met This Shift  6/25/2021 0603 by Nora Harris RN  Outcome: Met This Shift  6/24/2021 2246 by Roselia Gonzalez RN  Outcome: Met This Shift  Goal: No harm/injury to patient while restraints in use  6/25/2021 0829 by Roselia Gonzalez RN  Outcome: Met This Shift  6/25/2021 0603 by Roselia Gonzalez RN  Outcome: Met This Shift  6/24/2021 2246 by Roselia Gonzalez RN  Outcome: Met This Shift  6/24/2021 1836 by Dilan Stafford RN  Outcome: Met This Shift  Goal: Patient's dignity will be maintained  6/25/2021 0829 by Roselia Gonzalez RN  Outcome: Met This Shift  6/25/2021 0603 by Roselia Gonzalez RN  Outcome: Met This Shift  6/24/2021 2246 by Roselia Gonzalez RN  Outcome: Met This Shift  6/24/2021 1836 by Dilan Stafford RN  Outcome: Met This Shift     Problem: Skin Integrity:  Goal: Will show no infection signs and symptoms  Description: Will show no infection signs and symptoms  6/25/2021 0829 by Roselia Gonzalez RN  Outcome: Met This Shift  6/25/2021 0603 by Roselia Gonzalez RN  Outcome: Met This Shift  6/24/2021 2246 by Roselia Gonzalez RN  Outcome: Met This Shift  6/24/2021 1836 by Dilan Stafford RN  Outcome: Met This Shift  Goal: Absence of new skin breakdown  Description: Absence of new skin breakdown  6/25/2021 0829 by Roselia Gonzalez RN  Outcome: Met This Shift  6/25/2021 0603 by Roselia Gonzalez RN  Outcome: Met This Shift  6/24/2021 2246 by Roselia Gonzalez RN  Outcome: Met This Shift  6/24/2021 1836 by Dilan Stafford RN  Outcome: Met This Shift

## 2021-06-25 NOTE — PROGRESS NOTES
Upon return to unit patient's son at bedside. This RN clarified that they did not want intubation. Son states no to intubation and called his mother. After talking to mother she wants intubation if needed. This RN will pass along to primary.    Evalyn Oppenheim, RN

## 2021-06-25 NOTE — PROGRESS NOTES
Pt remains on soft restraints at the left wrist and bilateral ankles with sitter at bedside due to pt being non-compliant with medical equipment and safety precautions

## 2021-06-25 NOTE — PLAN OF CARE
Problem: Falls - Risk of:  Goal: Will remain free from falls  Description: Will remain free from falls  6/24/2021 2246 by Johnathan Vernon RN  Outcome: Met This Shift  6/24/2021 1836 by Estee Hurt RN  Outcome: Met This Shift  Goal: Absence of physical injury  Description: Absence of physical injury  6/24/2021 2246 by Johnathan Vernon RN  Outcome: Met This Shift  6/24/2021 1836 by Estee Hurt RN  Outcome: Met This Shift     Problem: Pain:  Goal: Pain level will decrease  Description: Pain level will decrease  6/24/2021 2246 by Johnathan Vernon RN  Outcome: Met This Shift  6/24/2021 1836 by Estee Hurt RN  Outcome: Met This Shift  Goal: Control of acute pain  Description: Control of acute pain  6/24/2021 2246 by Johnathan Vernon RN  Outcome: Met This Shift  6/24/2021 1836 by Estee Hurt RN  Outcome: Met This Shift  Goal: Control of chronic pain  Description: Control of chronic pain  6/24/2021 2246 by Johnathan Vernon RN  Outcome: Met This Shift  6/24/2021 1836 by Estee Hurt RN  Outcome: Met This Shift     Problem: Non-Violent Restraints  Goal: Removal from restraints as soon as assessed to be safe  6/24/2021 2246 by Johnathan Vernon RN  Outcome: Met This Shift  6/24/2021 1528 by Daly Clark RN  Outcome: Met This Shift  Goal: No harm/injury to patient while restraints in use  6/24/2021 2246 by Johnathan Vernon RN  Outcome: Met This Shift  6/24/2021 1836 by Estee Hurt RN  Outcome: Met This Shift  6/24/2021 1528 by Daly Clark RN  Outcome: Met This Shift  Goal: Patient's dignity will be maintained  6/24/2021 2246 by Johnathan Vernon RN  Outcome: Met This Shift  6/24/2021 1836 by Estee Hurt RN  Outcome: Met This Shift  6/24/2021 1528 by Daly Clark RN  Outcome: Met This Shift     Problem: Skin Integrity:  Goal: Will show no infection signs and symptoms  Description: Will show no infection signs and symptoms  6/24/2021 2246 by Ness Alexander RN  Outcome: Met This Shift  6/24/2021 1836 by Robert Schneider RN  Outcome: Met This Shift  Goal: Absence of new skin breakdown  Description: Absence of new skin breakdown  6/24/2021 2246 by Ness Alexander RN  Outcome: Met This Shift  6/24/2021 1836 by Robert Schneider RN  Outcome: Met This Shift

## 2021-06-25 NOTE — PLAN OF CARE
Problem: Falls - Risk of:  Goal: Will remain free from falls  Description: Will remain free from falls  6/25/2021 1811 by Natanael Dickson RN  Outcome: Met This Shift     Problem: Non-Violent Restraints  Goal: Removal from restraints as soon as assessed to be safe  6/25/2021 1811 by Natanael Dickson RN  Outcome: Not Met This Shift     Problem: Non-Violent Restraints  Goal: No harm/injury to patient while restraints in use  6/25/2021 1811 by Natanael Dickson RN  Outcome: Met This Shift     Problem: Non-Violent Restraints  Goal: Patient's dignity will be maintained  6/25/2021 1811 by Natanael Dickson RN  Outcome: Met This Shift

## 2021-06-25 NOTE — CARE COORDINATION
Care Coordination:  Spoke to wife 6/24. Confirmed plan is [facility]. She stated preference for [facility] as he was there before. Referral made to [facility] yesterday. Following case today. Pending heart cath. OT score 14/24. Wife called and requested a referral to [address] [place] [address]. Referral called to [facility]. She will follow.

## 2021-06-25 NOTE — PROGRESS NOTES
INPATIENT CARDIOLOGY FOLLOW-UP    Name: Tangela Mathias    Age: 78 y.o. Date of Admission: 6/21/2021  5:16 AM    Date of Service: 6/25/2021    Chief Complaint: Follow-up for NSTEMI    Interim History:  Apparently, patient became confused overnight and required restraints. He appears little bit more alert this morning and dyspnea is improved discussed with Dr. Yenny Nguyen about taking him to the lab. Apparently, he went down to the lab and became acutely decompensated requiring BiPAP. After getting the arterial access patient's respiratory status worsen and the procedure was aborted. Dr. Yenny Nguyen discussed with his son about intubating and he requests no intubation. We will treat him medically at this time and continue supportive care. SR on telemetry. Review of Systems:   Cardiac: As per HPI  General: No fever, chills  Pulmonary: As per HPI  HEENT: No visual disturbances, difficult swallowing  GI: No nausea, vomiting  Endocrine: No thyroid disease or DM  Musculoskeletal: DOWELL x 4, no focal motor deficits  Skin: Intact, no rashes  Neuro/Psych: No headache or seizures    Problem List:  Patient Active Problem List   Diagnosis    Hypertension    Hypercholesterolemia    CAD (coronary artery disease)    Benign prostatic hyperplasia    CTS (carpal tunnel syndrome)    Cervical spondylosis    DM type 2 (diabetes mellitus, type 2) (HCC)    Exogenous obesity    OA (osteoarthritis)    Polyp of colon    Vitamin B12 deficiency    Vitamin D deficiency    Morbidly obese ([address]    Asthmatic bronchitis, mild intermittent, uncomplicated    Chest pain    Generalized weakness    NSTEMI (non-ST elevated myocardial infarction) ([address]       Allergies: Allergies   Allergen Reactions    Ace Inhibitors      LIZZ    Anesthetics, Amide      Hospitalization after use.     Vicodin [Hydrocodone-Acetaminophen] Other (See Comments)     Abdominal pain constipation        Current Medications:  Current 0-12 Units Subcutaneous TID WC [place] Petty MD   4 Units at 06/25/21 1146    insulin lispro (HUMALOG) injection vial 0-6 Units  0-6 Units Subcutaneous Nightly [place] Petty MD   2 Units at 06/24/21 2004    glucose (GLUTOSE) 40 % oral gel 15 g  15 g Oral PRN [place] Petty MD        dextrose 50 % IV solution  12.5 g Intravenous PRN Florida Petty MD        glucagon (rDNA) injection 1 mg  1 mg Intramuscular PRN Florida Petty MD        dextrose 5 % solution  100 mL/hr Intravenous PRN Florida Petty MD        acetaminophen (TYLENOL) tablet 500 mg  500 mg Oral Q4H PRN [place] Petty MD   500 mg at 06/22/21 2155      sodium chloride      dextrose         Physical Exam:  BP (!) 119/57   Pulse 75   Temp 97.2 °F (36.2 °C) (Skin)   Resp 20   Ht 5' 9\" (1.753 m)   Wt 246 lb 9.6 oz (111.9 kg)   SpO2 98%   BMI 36.42 kg/m²   Wt Readings from Last 3 Encounters:   06/24/21 246 lb 9.6 oz (111.9 kg)   06/15/21 210 lb (95.3 kg)   06/14/21 210 lb (95.3 kg)     Appearance: Awake, alert, mild acute respiratory distress. Patient appears slightly confused but responding appropriately and has restraints on his legs .   Skin: Intact, no rash  Head: Normocephalic, atraumatic  Eyes: EOMI, no conjunctival erythema  ENMT: No pharyngeal erythema, MMM, no rhinorrhea  Neck: Supple, no elevated JVP, no carotid bruits  Lungs: Scattered bilateral wheezing  Cardiac: Regular rate and rhythm, +S1S2, no murmurs apparent  Abdomen: Soft, nontender, +bowel sounds  Extremities: Moves all extremities x 4, no lower extremity edema  Neurologic: No focal motor deficits apparent, normal mood and affect  Peripheral Pulses: Intact posterior tibial pulses bilaterally    Intake/Output:    Intake/Output Summary (Last 24 hours) at 6/25/2021 1454  Last data filed at 6/25/2021 1111  Gross per 24 hour   Intake 910 ml   Output 2600 ml   Net -1690 ml     I/O this shift:  In: -   Out: 1000 [Urine:1000]    Laboratory Tests:  Recent Labs     06/23/21  4121 06/24/21  0646 06/25/21  0442    136 137   K 4.1 4.7 4.0   CL 96* 98 100   CO2 22 20* 24   BUN 32* 44* 44*   CREATININE 1.3* 1.5* 1.3*   GLUCOSE 240* 163* 162*   CALCIUM 9.0 8.6 8.6     Lab Results   Component Value Date    MG 1.7 06/21/2021     Recent Labs     06/23/21  0756 06/24/21  0646 06/25/21 0442   ALKPHOS 143* 144* 169*   ALT 38 93* 110*   AST 28 86* 68*   PROT 6.9 6.7 6.7   BILITOT 1.1 0.9 0.8   LABALBU 3.4* 3.1* 2.8*     Recent Labs     06/23/21  0756 06/25/21 0442   WBC 5.5 5.8   RBC 2.24* 2.20*   HGB 8.4* 8.0*   HCT 24.4* 25.2*   .9* 114.5*   MCH 37.5* 36.4*   MCHC 34.4 31.7*   RDW 15.1* 15.5*    220   MPV 10.9 11.3     Lab Results   Component Value Date    CKTOTAL 339 (H) 06/16/2021    CKMB 2.2 06/21/2021    TROPONINI 0.03 12/22/2020    TROPONINI 0.03 12/22/2020    TROPONINI <0.01 06/13/2020     Lab Results   Component Value Date    INR 1.1 06/21/2021    INR 1.0 09/06/2016    INR 1.1 12/28/2015    PROTIME 12.4 06/21/2021    PROTIME 11.6 09/06/2016    PROTIME 12.1 12/28/2015     Lab Results   Component Value Date    TSH 2.600 06/21/2021     Lab Results   Component Value Date    LABA1C 6.7 (H) 03/11/2021     No results found for: EAG  Lab Results   Component Value Date    CHOL 144 03/11/2021    CHOL 147 12/04/2020    CHOL 146 06/15/2020     Lab Results   Component Value Date    TRIG 186 (H) 03/11/2021    TRIG 173 (H) 12/04/2020    TRIG 177 (H) 06/15/2020     Lab Results   Component Value Date    HDL 36 03/11/2021    HDL 37 12/04/2020    HDL 33 (L) 06/15/2020     Lab Results   Component Value Date    LDLCALC 71 03/11/2021    LDLCALC 75 12/04/2020    LDLCALC 86 06/15/2020     Lab Results   Component Value Date    LABVLDL 37 03/11/2021    LABVLDL 35 12/04/2020    LABVLDL 44 03/07/2019     Lab Results   Component Value Date    CHOLHDLRATIO 4.4 06/15/2020    CHOLHDLRATIO 3.6 12/06/2019    CHOLHDLRATIO 3.8 09/20/2019       Cardiac Tests:  Telemetry findings reviewed: SR at rate 100s no new tachy/bradyarrhythmias overnight     EKG: Normal sinus rhythm right bundle branch block, left axis deviation, abnormal EKG.    Vitals and labs were reviewed: As above blood pressure 119/57 rate of 75 sats 98 % on room air      Labs-BUN/creatinine 32/1.3>> 44/one-point>> 44/1.3 proBNP 4623 high-sensitivity troponin 71 AST/ALT 19/54 H&H 9.1/27.1>> 8/25. 2     1. [facility]/7/[facility] Dr Sherri Dominguez: PTCA of LCx--> reduction to 25%s stenosis  2. 2006 Nonischemic stress, NWM.  3. 6/2020 Lexiscan MPS: Non ischemic  4. 11/2020 TTE Dr Dayron Castro: EF 60-65%. Mild AS HANNAH 1.9. Moderately dilated LA.     Cardiac catheterization-6/22/2021: Severe multivessel CAD, full report is pending    TTE-limited echo-6/23/2021  Summary   Left ventricle size is normal.   Ejection fraction is visually estimated at 40-45%. Overall ejection fraction mild-to-moderately decreased . Inferior wall is akinetic. Normal left ventricle wall thickness. Stage II diastolic dysfunction. The left atrium is moderately dilated. Normal right ventricular size. Right ventricle global systolic function is mildly reduced. TAPSE 15 mm. No evidence for hemodynamically significant pericardial effusion. Limited to assess LV function   Technically difficult examination. Definity echo contrast was used to   delineate endocardial borders. Assessment:  · NSTEMI>> multivessel CAD  · Mild decomp heart failure mostly HFmrEF,  Stable. · New LV dysfunction mostly from ischemic cardiomyopathy. · Acute respiratory failure secondary to CHF/cardiogenic shock  · Bronchospasm, improving. · History of CAD status post PCI to left circumflex artery-November 1994, diffuse moderate disease throughout LAD territory and chronic total occlusion of the RCA.   · CKD stage III  · Hypertension, well controlled  · Hyperlipidemia on statin  · Chronic right bundle branch block  · Severe hearing loss  · History of LIZZ secondary to ACE inhibitor therapy  · Former smoker with 120-pack-year history of smoking quit in 1981  · Chronic anemia  · CKD stage III, creatinine 1.3>> 1.2>> 1.3>>1.5  · Confusion mostly from sedation with an underlying mild cognitive dysfunction.         Plan:  · Patient is scheduled  for high risk PCI to circumflex and possibly to LAD on Friday. PCI was attempted and subsequently procedure was aborted due to deterioration in respiratory status. Procedure was aborted. Dr. Donato Tidwell discussed with son requested no intubation. Patient is being transferred back to floor. We will treat him conservatively. Discussed with Dr. Dougie Miles  · We will start him back on IV Lasix. · Bronchospasm is better we will continue nebs every 6 hours  · Labs are reviewed. · Continue nitroglycerin paste 0.5 inches every 6 hours  · Echo results were reviewed LVEF is lower than 4045%. · continue him on O2  · Continue home medications including aspirin 81 daily Norvasc, atorvastatin and metoprolol. We will check his blood pressure then will plan for holding any of the blood pressure medications. · His prognosis seems poor. Discussed with his son who was at the bedside and also discussed with patient's primary care provider Dr. Dougie Miles. Pascual Ramirez MD., Ailin Wilkinson.   [facility]) Cardiology

## 2021-06-25 NOTE — CARE COORDINATION
Care Coordination:  Heart cath cancelled for today due to increased pulmonary congestion. SW spoke to wife yesterday. She requests [facility] for dc as she does not feel she will be able to manage until he is stronger. Melvina Beth Referral sent to [facility] . She will follow.

## 2021-06-25 NOTE — PROGRESS NOTES
Informed pacu that pt has continued wheezing and coughing but remains on 3, pt being assessed by pacu nurse to determine if pt can still have procedure, iv fluids stopped, lasix will be ordered, and pacu wants pt to receive plavix and asa early

## 2021-06-25 NOTE — PROGRESS NOTES
Occupational Therapy  OCCUPATIONAL THERAPY treatment note  [address] [facility] [address] [facility]  [address] [place] [address], [facility], [address]                                                Patient Name: Bonita Rowell  MRN: [id number]  : 1940  Room: [address] [place] [address]    Per eval:  Evaluating OT: Austin Stacy OTR/L #[id number]     Referring Provider: Butch Rausch MD  Specific Provider Orders/Date: OT eval and treat 21    Diagnosis: NSTEMI   Pt admitted to hospital d/t chest pain    Surgery / Procedure: 21:  Right radial heart cath placed    Pertinent Medical History: Arthritis, BPH, CAD, CTS, Glaucoma, OA, partial blindness (left eye), HTN       Precautions:  Fall Risk, O2, TSM, 3-point restraints, TAPS, Bed Alarm, Cognition    Assessment of current deficits    [x] Functional mobility  [x]ADLs  [x] Strength               [x]Cognition    [x] Functional transfers   [x] IADLs         [x] Safety Awareness   [x]Endurance    [] Fine Coordination              [x] Balance      [] Vision/perception   []Sensation     []Gross Motor Coordination  [] ROM  [] Delirium                   [] Motor Control     OT PLAN OF CARE   OT POC based on physician orders, patient diagnosis and results of clinical assessment    Frequency/Duration: 1-3 days/wk for 2 weeks PRN   Specific OT Treatment Interventions to include:   * Instruction/training on adapted ADL techniques and AE recommendations to increase functional independence within precautions       * Training on energy conservation strategies, correct breathing pattern and techniques to improve independence/tolerance for self-care routine  * Functional transfer/mobility training/DME recommendations for increased independence, safety, and fall prevention  * Patient/Family education to increase follow through with safety techniques and functional independence  * Recommendation of environmental modifications for increased safety with functional transfers/mobility and ADLs  * Cognitive retraining/development of therapeutic activities to improve problem solving, judgement, memory, and attention for increased safety/participation in ADL/IADL tasks  * Therapeutic activities to facilitate/challenge dynamic balance, stand tolerance for increased safety and independence with ADLs  * Therapeutic activities to facilitate gross/fine motor skills for increased independence with ADLs  * Positioning to improve skin integrity, interaction with environment and functional independence    Recommended Adaptive Equipment: TBD     Pt is poor historian    Home Living: Pt is poor historian, Pt wife stated: Pt lives with his wife in a 1 story home with 2 CLAIR (1HR) to enter   Bathroom setup: walk-in shower    Equipment owned: sc, w/w, cane, BSC    Prior Level of Function: Independent with ADLs , Independent with IADLs; ambulated with cane   Driving: no   Occupation: retired vieira    Pain Level: 0/10  Cognition: A&O: 1/4 (self); Follows 1-2 step directions   Memory:  Poor   Sequencing:  Poor   Problem solving:  Poor   Judgement/safety:  Poor     Functional Assessment:  AM-PAC Daily Activity Raw Score: 14/24   Initial Eval Status  Date: 6/23/21 Treatment Status  Date:6/25/21 STGs = LTGs  Time frame: 10-14 days   Feeding Stand by Assist  SBA Modified [place]   Grooming Stand by Assist  SBA (seated for oral hygiene) Modified [place]    UB Dressing Minimal Assist  Mod A (due to limited ROM) Modified [place]    LB Dressing Maximal Assist  Max A (assist with socks, deferred edu on AE due to difficulty problem solving) Minimal Assist    Bathing Dependent  D/t cognition Max A (simulated, pt declined) Mod Assist    Toileting Dependent   D/t cognition Max A  Moderate Assist    Bed Mobility  Supine to sit: Moderate Assist   Sit to supine:  Moderate Assist  Supine to sit: Moderate Assist  Supine to sit: Stand by Assist   Sit to supine: Stand by Assist Functional Transfers Moderate Assist x2   Sit<>stand with HHA Commode: Mod A Minimal Assist    Functional Mobility Moderate Assist   Functional side steps to [facility] with HHA Mod A (using ww, to/from bathroom) Stand by Assist    Balance Sitting:     Static:  SBA    Dynamic:SBA  Standing: Mod A Sitting: SBA  Standing: Mod A Sitting:     Static:  Mod I    Dynamic:Mod I  Standing: SBA   Activity Tolerance P+ fair Fair   Visual/  Perceptual Glasses: yes, wfl                  Hand Dominance Right   AROM (PROM) Strength Additional Info:    RUE  WFL 4/5 good  and wfl FMC/dexterity noted during ADL tasks       LUE WFL 4/5 good  and wfl FMC/dexterity noted during ADL tasks       Hearing: WFL   Sensation:  No c/o numbness or tingling   Tone: WFL   Edema: WFL    Comments: Cleared by RN to see pt. Upon arrival patient supine in bed and agreeable to OT session. Wife present. At end of session, patient sitting in chair with call light and phone within reach, all lines and tubes intact. RN notified. Treatment: Pt appeared latent with following commands and answering questions. Pt required vc's and physical assist for proper technique/safety with hand placement/body mechanics/posture for bed mobility/ADLs/functional tranfers/mobility/ww management. Pt required vc's for sequencing/initiation of ADLs/functional transfers. Pt able to  sit EOB ~10 mins to increase core strength/balance/activity tolerance for ease with ADLs. Pt required increased time to complete ADLs/functional transfers due to management of multiple lines. Pt required skilled monitoring of SpO2 during session, which was >>95% on 2L. Pt required rest breaks during session. . Pt appeared to have tolerated session well and appears motivated/cooperative/pleasant . Pt instructed on use of call light for assistance and fall prevention. Pt demo'ing fair understanding of education provided.  Continue to educate.     -pt has made fair progress toward goals  -continue current POC    Time In: 6564  Time Out: 0920  Total Treatment Time: 30    Min Units   OT Eval Low 23391       OT Eval Medium 60355     OT Eval High [id number]     OT Re-Eval [id number]     Therapeutic Ex [id number]     Therapeutic Activities 23695     ADL/Self Care 02846 30 2   Orthotic Management [address]       Manual [id number]     Neuro Re-Ed [id number]       Non-Billable Time          Ulysses Jones OTR/THUY [id number]

## 2021-06-25 NOTE — CARE COORDINATION
Care Coordination:   Wife called . Requests referral to Jessi  . [facility] will continue to follow . Referral made to [facility] at [facility] this date.

## 2021-06-25 NOTE — PROGRESS NOTES
Spoke with the wife over the telephone  Explained the situation to her in detail  Patient is a poor candidate for any interventions  She requested no intubation no life support etc.  Spoke with cardiology earlier  Medical management was recommended for his coronary artery disease and LV dysfunction  I suggested palliative care consult  DNR CCA was recommended

## 2021-06-25 NOTE — PROGRESS NOTES
Pt alert and confused remains in soft restraints at the left wrist and bilateral ankle, pt is still non-compliant and pulls at medical equipment and not easily to verbally re-direct, sitter at bedside and call light within reach

## 2021-06-26 NOTE — PLAN OF CARE
Problem: Non-Violent Restraints  Goal: Removal from restraints as soon as assessed to be safe  Outcome: Met This Shift     Problem: Non-Violent Restraints  Goal: No harm/injury to patient while restraints in use  Outcome: Met This Shift     Problem: Non-Violent Restraints  Goal: Patient's dignity will be maintained  Outcome: Met This Shift

## 2021-06-26 NOTE — PROGRESS NOTES
INPATIENT CARDIOLOGY FOLLOW-UP    Name: Mai Bucio    Age: 78 y.o. Date of Admission: 6/21/2021  5:16 AM    Date of Service: 6/26/2021    Chief Complaint: Follow-up for coronary atherosclerosis, non-ST elevation myocardial infarction, ischemic cardiomyopathy, chronic systolic heart failure, chronic kidney disease, cognitive dysfunction    Interim History: Patient appears mildly lethargic but otherwise comfortable with no evidence of acute distress. He remains hemodynamically stable and in sinus rhythm with stable renal function. Review of Systems: The remainder of a complete multisystem review including consitutional, central nervous, respiratory, circulatory, gastrointestinal, genitourinary, endocrinologic, hematologic, musculoskeletal and psychiatric are negative. Problem List:  Patient Active Problem List   Diagnosis    Hypertension    Hypercholesterolemia    CAD (coronary artery disease)    Benign prostatic hyperplasia    CTS (carpal tunnel syndrome)    Cervical spondylosis    DM type 2 (diabetes mellitus, type 2) ([facility]    Exogenous obesity    OA (osteoarthritis)    Polyp of colon    Vitamin B12 deficiency    Vitamin D deficiency    Morbidly obese ([address]    Asthmatic bronchitis, mild intermittent, uncomplicated    Chest pain    Generalized weakness    NSTEMI (non-ST elevated myocardial infarction) ([facility])       Allergies: Allergies   Allergen Reactions    Ace Inhibitors      LIZZ    Anesthetics, Amide      Hospitalization after use.     Vicodin [Hydrocodone-Acetaminophen] Other (See Comments)     Abdominal pain constipation        Current Medications:  Current Facility-Administered Medications   Medication Dose Route Frequency Provider Last Rate Last Admin    hydrOXYzine (VISTARIL) capsule 50 mg  50 mg Oral Nightly Lakhwinder Garcia MD   50 mg at 06/25/21 2127    clopidogrel (PLAVIX) tablet 75 mg  75 mg Oral Daily Carlota Herbert MD   75 mg at 06/26/21 2962    ipratropium-albuterol (DUONEB) nebulizer solution 1 ampule  1 ampule Inhalation Q6H Kenna Emanuel MD   1 ampule at 06/26/21 0837    sodium chloride flush 0.9 % injection 5-40 mL  5-40 mL Intravenous 2 times per day Ruben Riley MD   10 mL at 06/26/21 1008    sodium chloride flush 0.9 % injection 5-40 mL  5-40 mL Intravenous PRN Ruben Riley MD   10 mL at 06/26/21 1012    0.9 % sodium chloride infusion  25 mL Intravenous PRN Ruben Riley MD        [Held by provider] furosemide (LASIX) injection 20 mg  20 mg Intravenous Daily Ruben Riley MD   20 mg at 06/23/21 0912    carvedilol (COREG) tablet 6.25 mg  6.25 mg Oral BID WC Ruben Riley MD   6.25 mg at 06/26/21 0754    aspirin EC tablet 81 mg  81 mg Oral Daily Ruben Riley MD   81 mg at 06/26/21 1009    nitroglycerin (NITRO-BID) 2 % ointment 0.5 inch  0.5 inch Topical 4 times per day Ruben Riley MD   0.5 inch at 06/26/21 1216    atorvastatin (LIPITOR) tablet 40 mg  40 mg Oral Daily Ruben Riley MD   40 mg at 06/26/21 1009    cefdinir (OMNICEF) capsule 300 mg  300 mg Oral BID Ruben Riley MD   300 mg at 06/26/21 1009    latanoprost (XALATAN) 0.005 % ophthalmic solution 1 drop  1 drop Right Eye Nightly Ruben Rilye MD   1 drop at 06/25/21 2121    pantoprazole (PROTONIX) tablet 40 mg  40 mg Oral QAM AC Ruben Riley MD   40 mg at 06/26/21 0614    tamsulosin (FLOMAX) capsule 0.4 mg  0.4 mg Oral Daily Ruben Riley MD   0.4 mg at 06/26/21 1008    vitamin D (ERGOCALCIFEROL) capsule 50,000 Units  50,000 Units Oral Weekly Ruben Riley MD   50,000 Units at 06/23/21 0913    insulin lispro (HUMALOG) injection vial 0-12 Units  0-12 Units Subcutaneous TID WC Ruben Riley MD   4 Units at 06/26/21 1215    insulin lispro (HUMALOG) injection vial 0-6 Units  0-6 Units Subcutaneous Nightly Ruben Riley MD   2 Units at 06/25/21 2125    glucose (GLUTOSE) 40 % oral gel 15 g  15 g Oral PRN Ruben Riley MD        dextrose 50 % IV solution  12.5 g Intravenous PRN Corine Flores MD        glucagon (rDNA) injection 1 mg  1 mg Intramuscular PRN Corine Flores MD        dextrose 5 % solution  100 mL/hr Intravenous PRN Corine Flores MD        acetaminophen (TYLENOL) tablet 500 mg  500 mg Oral Q4H PRN Corine Flores MD   500 mg at 06/22/21 2155      sodium chloride      dextrose         Physical Exam:  BP (!) 105/58   Pulse 72   Temp 96.7 °F (35.9 °C) (Temporal)   Resp 24   Ht 5' 9\" (1.753 m)   Wt 246 lb 9.6 oz (111.9 kg)   SpO2 100%   BMI 36.42 kg/m²   Weight change: Wt Readings from Last 3 Encounters:   06/24/21 246 lb 9.6 oz (111.9 kg)   06/15/21 210 lb (95.3 kg)   06/14/21 210 lb (95.3 kg)     The patient is awake, alert and in no discomfort or distress. No gross musculoskeletal deformity is present. No significant skin or nail changes are present. Gross examination of head, eyes, nose and throat are negative. Jugular venous pressure is normal and no carotid bruits are present. Normal respiratory effort is noted with no accessory muscle usage present. Lung fields are clear to ascultation. Cardiac examination is notable for a regular rate and rhythm with no palpable thrill. No gallop rhythm or cardiac murmur are identified. A benign abdominal examination is present with no masses or organomegaly. Intact pulses are present throughout all extremities and no peripheral edema is present. No focal neurologic deficits are present. Intake/Output:    Intake/Output Summary (Last 24 hours) at 6/26/2021 1223  Last data filed at 6/26/2021 0950  Gross per 24 hour   Intake 170 ml   Output 1900 ml   Net -1730 ml     I/O this shift:  In: 120 [P.O.:120]  Out: -     Laboratory Tests:  Lab Results   Component Value Date    CREATININE 1.3 (H) 06/26/2021    BUN 44 (H) 06/26/2021     06/26/2021    K 3.5 06/26/2021    CL 99 06/26/2021    CO2 28 06/26/2021     No results for input(s): CKTOTAL, CKMB in the last 72 hours.     Invalid input(s): TROPONONI  No results found for: BNP  Lab Results   Component Value Date    WBC 5.8 06/25/2021    RBC 2.20 06/25/2021    HGB 8.0 06/25/2021    HCT 25.2 06/25/2021    .5 06/25/2021    MCH 36.4 06/25/2021    MCHC 31.7 06/25/2021    RDW 15.5 06/25/2021     06/25/2021    MPV 11.3 06/25/2021     Recent Labs     06/24/21  0646 06/25/21  0442 06/26/21  0737   ALKPHOS 144* 169* 178*   ALT 93* 110* 95*   AST 86* 68* 39   PROT 6.7 6.7 6.3*   BILITOT 0.9 0.8 0.9   LABALBU 3.1* 2.8* 3.1*     Lab Results   Component Value Date    MG 1.7 06/21/2021     Lab Results   Component Value Date    PROTIME 12.4 06/21/2021    INR 1.1 06/21/2021     Lab Results   Component Value Date    TSH 2.600 06/21/2021     No components found for: CHLPL  Lab Results   Component Value Date    TRIG 186 (H) 03/11/2021    TRIG 173 (H) 12/04/2020    TRIG 177 (H) 06/15/2020     Lab Results   Component Value Date    HDL 36 03/11/2021    HDL 37 12/04/2020    HDL 33 (L) 06/15/2020     Lab Results   Component Value Date    LDLCALC 71 03/11/2021    [facility] 75 12/04/2020    LDLCALC 86 06/15/2020       Cardiac Tests:  Telemetry findings reviewed: sinus rhythm with occasional supraventricular ectopy, no new tachy/bradyarrhythmias overnight  Chest X-ray: A most recent chest x-ray reviewed at the time of evaluation demonstrated evidence of cardiomegaly with mild interstitial infiltrates improved from his previous study      ASSESSMENT / PLAN: Clinical basis, the patient presently appears somewhat lethargic but otherwise comfortable in the face of his extensive multivessel coronary disease and mild to moderate left ventricular systolic dysfunction with most recent radiographic studies demonstrating no evidence of significant interstitial infiltrates.   On the basis of the patient's adverse event during attempts of high risk intervention with subsequent request of family for conservative measures, ongoing medical management will be maintained inclusive of diuretics and aggressive medical management of ischemia in attempt to provide comfort. Nutritional support will additionally be beneficial to fluid mobilization and reducing his risk of the development of progressive debilitation. Appropriate risk factor modification of blood pressure and serum lipids will remain essential to reducing risk of future atherosclerotic development. His overall prognosis remains guarded with his family aware and recommendation of palliative care assistance to provide support and additional assistance in advanced directive decisions. Note: This report was completed utilizing computer voice recognition software. Every effort has been made to ensure accuracy, however; inadvertent computerized transcription errors may be present. Patti Webster.  Elidia Gallagher, [address] [facility]

## 2021-06-26 NOTE — PROGRESS NOTES
Robby Dao MD FACP  Progress notes      CHIEF COMPLAINT: Chest pain      HISTORY OF PRESENT ILLNESS:   1021/21   78year-old  woman in the emergency room at [facility] earlier this morning  The son and a registered nurse were at bedside  Data reviewed in detail  Spoke with the ER physician in person  Patient with known history of coronary artery disease had an angioplasty many years ago for the LAD  Recently hospitalized with acute cystitis  Refused to see cardiologist on that admission  Now presents with onset of anterior chest discomfort described as a heaviness that woke him up at 5 AM  Nitroglycerin sublingual apparently relieved the pain in the ER  Troponins BNP were markedly elevated along with EKG changes suggestive of acute non-ST elevation myocardial infarction  He also reports shortness of breath even at rest  6/22/2021  Patient was seen on the floor earlier this morning  No further angina  On IV heparin infusion  Tolerating medications well  Data reviewed in detail  6/23/21  Patient was seen on the floor earlier this morning  Son was at bedside  Overnight delirium confusion requiring restraints and Haldol  Upon explaining things to the patient he seemed to understand what I was saying  Coronary angiography showing multivessel disease with severe left ventricular dysfunction  Spoke with the son at length  6/24/2021  Patient was seen on the floor earlier this morning son at bedside  Overnight events noted that included delirium and agitation  Remains confused this morning  Lower extremity restraints on  Barrera catheter intact  Data reviewed in detail  He denied chest pain  6/25/21  Patient was seen on the floor earlier this afternoon  Scheduled for stenting of the coronary  Pain-free  Confused disoriented  In restraints  6/26/2021  78year-old man seen on the floor earlier this morning  Family members at bedside  Spoke with the nursing staff  On supplemental oxygen  Oriented to person only  Tolerating medications  Mental status somewhat better  Past Medical History:    Past Medical History:   Diagnosis Date    Arthritis     bilateral knee    BPH (benign prostatic hypertrophy) 8/4/2016    CAD (coronary artery disease)     Cervical spondylosis 8/4/2016    Constipation     prune juice in am    CTS (carpal tunnel syndrome) 8/4/2016    Diabetes mellitus ([facility] [address]     DM type 2 (diabetes mellitus, type 2) ([facility] [address] 8/4/2016    Exogenous obesity 8/4/2016    Glaucoma     bilateral    History of EKG 10/14/2015 per Dr Shahida Willson on chart.     Hypercholesterolemia     Hypertension     OA (osteoarthritis) 8/4/2016    Partial blindness     left eye    Polyp of colon 8/4/2016    PONV (postoperative nausea and vomiting)     Preoperative clearance 11/12/2015    per Dr Shahida Willson on chart; for right tjak Dr Michelle Chahal Vitamin B12 deficiency 8/4/2016    Vitamin D deficiency 8/4/2016    Wears glasses        Past Surgical History:    Past Surgical History:   Procedure Laterality Date    CARDIAC CATHETERIZATION  06/22/2021    [facility]    CATARACT REMOVAL WITH IMPLANT Bilateral     COLONOSCOPY  10/31/2013    DIAGNOSTIC CARDIAC CATH LAB PROCEDURE  1990 approximately    ? stent    EYE SURGERY Bilateral     cataract    JOINT REPLACEMENT Right 12/08/2015    SHOULDER ARTHROPLASTY Left 2000    TOTAL KNEE ARTHROPLASTY Right 11/30/2015       Medications Prior to Admission:    Medications Prior to Admission: metFORMIN (GLUCOPHAGE-XR) 500 MG extended release tablet, Take 1 tablet by mouth 3 times daily (with meals)  cefdinir (OMNICEF) 300 MG capsule, Take 1 capsule by mouth 2 times daily for 10 days  pantoprazole (PROTONIX) 40 MG tablet, TAKE 1 TABLET BY MOUTH EVERY DAY BEFORE BREAKFAST  oxybutynin (DITROPAN XL) 15 MG extended release tablet, Take 15 mg by mouth daily  valsartan (DIOVAN) 80 MG tablet, Take 1 tablet by mouth daily  amLODIPine (NORVASC) 5 MG tablet, TAKE 1 TABLET BY MOUTH EVERY DAY AT NIGHT  metoprolol tartrate (LOPRESSOR) 50 MG tablet, TAKE 1 TABLET BY MOUTH EVERY DAY  atorvastatin (LIPITOR) 40 MG tablet, TAKE 1 TABLET BY MOUTH EVERY DAY  dapagliflozin (FARXIGA) 10 MG tablet, TAKE 1 TABLET BY MOUTH EVERY DAY IN THE MORNING  vitamin E 400 UNIT capsule, Take 1 capsule by mouth daily  mirabegron (MYRBETRIQ) 25 MG TB24, Take 1 tablet by mouth three times a week Mon-Wed-& Friday  tamsulosin (FLOMAX) 0.4 MG capsule, TAKE 1 CAPSULE BY MOUTH EVERYDAY AT BEDTIME  blood glucose test strips (FREESTYLE TEST STRIPS) strip, Indications: freestyle test strips DX: E11.9 As needed. Diagnosis is E 11.9  FREESTYLE LANCETS MISC, 1 each by Does not apply route daily Indications: in AM DX: E11.9  Continuous Blood Gluc Sensor ([address] [place] [address]) MISC, 1 Units by Does not apply route 3 times daily  Continuous Blood Gluc  (FREESTYLE MAYELA READER) WALTER, 1 Units by Does not apply route 3 times daily e11.9  aspirin 81 MG EC tablet, Take 81 mg by mouth nightly  Coenzyme Q10 (CO Q-10) 100 MG CAPS, Take 100 mg by mouth nightly  Omega-3 Fatty Acids (FISH OIL) 300 MG CAPS, Take 1 capsule by mouth Daily with supper  vitamin B-12 (CYANOCOBALAMIN) 1000 MCG tablet, Take 1,000 mcg by mouth daily  Ascorbic Acid (VITAMIN C) 500 MG tablet, Take 500 mg by mouth daily  latanoprost (XALATAN) 0.005 % ophthalmic solution, Place 1 drop into the right eye nightly   vitamin D (ERGOCALCIFEROL) 1.25 MG (98875 UT) CAPS capsule, Take 1 capsule by mouth once a week for 12 doses (Patient taking differently: Take 50,000 Units by mouth once a week - Takes on Wednesday)    Allergies:    Ace inhibitors; Anesthetics, amide; and Vicodin [hydrocodone-acetaminophen]    Social History:    reports that he quit smoking about 40 years ago. His smoking use included cigars. He started smoking about 67 years ago. He has a 120.00 pack-year smoking history. He quit smokeless tobacco use about 5 years ago.  He reports that he does not drink alcohol and does not use drugs. Family History:   family history includes Heart Disease in his mother; Heart Disease (age of onset: 52) in his father. REVIEW OF SYSTEMS:  As above in the HPI, otherwise negative    PHYSICAL EXAM:    Vitals:  BP (!) 105/58   Pulse 72   Temp 96.7 °F (35.9 °C) (Temporal)   Resp 24   Ht 5' 9\" (1.753 m)   Wt 246 lb 9.6 oz (111.9 kg)   SpO2 100%   BMI 36.42 kg/m²     General:  Awake, alert, disoriented. Well developed, well nourished, well groomed. Morbidly obese  In mild to moderate distress  HEENT:  Normocephalic, atraumatic. Pupils equal, round, reactive to light. No scleral icterus. No conjunctival injection. .  No nasal discharge. Neck:  Supple  Heart:  RRR, no murmurs, gallops, rubs  Lungs: Scattered rails in the posterior lung fields noted although chest x-ray reveals no pulmonary edema  Abdomen:   Bowel sounds present, soft, nontender, no masses, no organomegaly, no peritoneal signs  Extremities:  No clubbing, cyanosis, or edema  Skin:  Warm and dry, no open lesions or rash  Neuro:  Cranial nerves 2-12 intact, no focal deficits  Breast: deferred  Rectal: deferred  Genitalia:  deferred    LABS: Data reviewed in detail      ASSESSMENT:      Active Problems:    NSTEMI (non-ST elevated myocardial infarction) ([facility] [address]  Acute CHF likely diastolic based on ischemic event  Multivessel coronary artery disease noted on angiography with a LV dysfunction  Acute delirium  Patient Active Problem List   Diagnosis    Hypertension    Hypercholesterolemia    CAD (coronary artery disease)    Benign prostatic hyperplasia    CTS (carpal tunnel syndrome)    Cervical spondylosis    DM type 2 (diabetes mellitus, type 2) (HCC)    Exogenous obesity    OA (osteoarthritis)    Polyp of colon    Vitamin B12 deficiency    Vitamin D deficiency    Morbidly obese (HCC)    Asthmatic bronchitis, mild intermittent, uncomplicated    Chest pain    Generalized weakness    NSTEMI (non-ST elevated myocardial infarction) [facility])           PLAN:  Spoke with son and wife multiple times yesterday  Extensive discussion once again this morning  He is not a candidate for any interventions  DNR CCA per family request  Not a candidate for rehab  Vistaril at bedtime  Palliative consult  Likely home in the next day or 2 with home care  Questions answered to their satisfaction  Medical management for coronary artery disease    Srinivas Jesus MD  9:56 AM  6/26/2021

## 2021-06-26 NOTE — PLAN OF CARE
Problem: Falls - Risk of:  Goal: Will remain free from falls  Description: Will remain free from falls  6/26/2021 0322 by Levi Glez RN  Outcome: Met This Shift  6/25/2021 1811 by Doc Pavon RN  Outcome: Met This Shift  Goal: Absence of physical injury  Description: Absence of physical injury  Outcome: Met This Shift     Problem: Pain:  Goal: Pain level will decrease  Description: Pain level will decrease  Outcome: Met This Shift  Goal: Control of acute pain  Description: Control of acute pain  Outcome: Met This Shift  Goal: Control of chronic pain  Description: Control of chronic pain  Outcome: Met This Shift     Problem: Non-Violent Restraints  Goal: No harm/injury to patient while restraints in use  6/26/2021 0322 by Levi Glez RN  Outcome: Met This Shift  6/25/2021 1811 by Doc Pavon RN  Outcome: Met This Shift  Goal: Patient's dignity will be maintained  6/26/2021 0322 by Levi Glez RN  Outcome: Met This Shift  6/25/2021 1811 by Doc Pavon RN  Outcome: Met This Shift     Problem: Skin Integrity:  Goal: Will show no infection signs and symptoms  Description: Will show no infection signs and symptoms  Outcome: Met This Shift  Goal: Absence of new skin breakdown  Description: Absence of new skin breakdown  Outcome: Met This Shift

## 2021-06-26 NOTE — PROGRESS NOTES
Patient remains in restraints due to removing his O2 and pulling at lines and is still confused.  Juan Boyd RN

## 2021-06-26 NOTE — PROGRESS NOTES
Patient resting calm in bed and sitter at bedside. Restraints discontinued at this time.    Nic Aiken RN

## 2021-06-26 NOTE — CONSULTS
Palliative Care Department  Palliative Care Initial Consult  Provider: Shantel Virk, JOHN  CNP  [phone]    Hospital Day: 6  Date of Initial Consult: 6/25/2021  Referring Provider: Dr. Miquel Oneal was consulted for assistance with: Code status Discussion, Assist with goals of care and Family Support    Chief Complaint: Paola Valadez is a 78 y.o. male with chief complaint of chest pain    HPI:   Paola Valadez is a 78 y.o. male with significant medical history of CAD with remote Cath with angioplasty, who was admitted on 6/21/2021 with chest pain and found to have NSTEMI. He underwent cath and found to have multivessel disease. He had respiratory distress when sent for PCI, intubation was declined by family and he was transferred back to the floor for conservative medical management. ASSESSMENT/PLAN:     Pertinent Hospital Diagnoses:  Current medical issues leading to Palliative Medicine involvement include   Active Hospital Problems    Diagnosis Date Noted    NSTEMI (non-ST elevated myocardial infarction) ([facility] [address] [I21.4] 06/21/2021     Palliative Care Encounter / Counseling Regarding Goals of Care:  Please see detailed goals of care discussion as below.  At this time, Paola Valadez, Does Not have capacity for medical decision-making. Capacity is time limited and situation/question specific.  During encounter the patient's wife was surrogate medical decision-maker   Outcome of goals of care meeting: continue current management and to be determined   Code status: DNR-CCA   Advanced Directives: Living Will and HC-POA   Surrogate/Legal NOK:   Michael Rose ()  Is the wife   Will follow and continued to discuss goals of care pending clinical progression. Referrals: none today    SUBJECTIVE:   Events/Discussions:  6/26/2021:  Patient seen at the bedside, no family present. He is alert but very confused. There is a sitter at the bedside.   He is a DNR-CCA, and the family is currently discussing and considering goals of care. They are considering SNF placement vs home with [facility]. Support was provided via phone. Will follow up further tomorrow to discuss options further. Past Medical History:   Diagnosis Date    Arthritis     bilateral knee    BPH (benign prostatic hypertrophy) 2016    CAD (coronary artery disease)     Cervical spondylosis 2016    Constipation     prune juice in am    CTS (carpal tunnel syndrome) 2016    Diabetes mellitus ([facility] [address]     DM type 2 (diabetes mellitus, type 2) ([facility] [address] 2016    Exogenous obesity 2016    Glaucoma     bilateral    History of EKG 10/14/2015 per Dr Charles Sanz on chart.  Hypercholesterolemia     Hypertension     OA (osteoarthritis) 2016    Partial blindness     left eye    Polyp of colon 2016    PONV (postoperative nausea and vomiting)     Preoperative clearance 2015    per Dr Charles Sanz on chart; for right tjak Dr Rony Pinto Vitamin B12 deficiency 2016    Vitamin D deficiency 2016    Wears glasses        Past Surgical History:   Procedure Laterality Date    CARDIAC CATHETERIZATION  2021    [facility]    CATARACT REMOVAL WITH IMPLANT Bilateral     COLONOSCOPY  10/31/2013    DIAGNOSTIC CARDIAC CATH LAB PROCEDURE   approximately    ? stent    EYE SURGERY Bilateral     cataract    JOINT REPLACEMENT Right 2015    SHOULDER ARTHROPLASTY Left 2000    TOTAL KNEE ARTHROPLASTY Right 2015       Family History   Problem Relation Age of Onset    Heart Disease Mother         AFIB    Heart Disease Father 52         MI       Allergies   Allergen Reactions    Ace Inhibitors      LIZZ    Anesthetics, Amide      Hospitalization after use.  Vicodin [Hydrocodone-Acetaminophen] Other (See Comments)     Abdominal pain constipation        ROS: UNLESS STATED ABOVE PATIENT DENIES:  CONSTITUTIONAL:  fever, chill, rigors, nausea, vomiting, fatigue.   HEENT: blurry

## 2021-06-26 NOTE — PLAN OF CARE
Problem: Falls - Risk of:  Goal: Will remain free from falls  Description: Will remain free from falls  6/26/2021 0907 by Narayan Gaines RN  Outcome: Met This Shift  6/26/2021 0322 by Narayan Gaines RN  Outcome: Met This Shift  Goal: Absence of physical injury  Description: Absence of physical injury  6/26/2021 0907 by Narayan Gaines RN  Outcome: Met This Shift  6/26/2021 0322 by Narayan Gaines RN  Outcome: Met This Shift     Problem: Pain:  Goal: Pain level will decrease  Description: Pain level will decrease  6/26/2021 0907 by Narayan Gaines RN  Outcome: Met This Shift  6/26/2021 0322 by Narayan Gaines RN  Outcome: Met This Shift  Goal: Control of acute pain  Description: Control of acute pain  6/26/2021 0907 by Narayan Gaines RN  Outcome: Met This Shift  6/26/2021 0322 by Narayan Gaines RN  Outcome: Met This Shift  Goal: Control of chronic pain  Description: Control of chronic pain  6/26/2021 0907 by Narayan Gaines RN  Outcome: Met This Shift  6/26/2021 0322 by Narayan Gaines RN  Outcome: Met This Shift     Problem: Non-Violent Restraints  Goal: No harm/injury to patient while restraints in use  Outcome: Met This Shift  Goal: Patient's dignity will be maintained  Outcome: Met This Shift     Problem: Skin Integrity:  Goal: Will show no infection signs and symptoms  Description: Will show no infection signs and symptoms  Outcome: Met This Shift  Goal: Absence of new skin breakdown  Description: Absence of new skin breakdown  Outcome: Met This Shift

## 2021-06-27 NOTE — PROGRESS NOTES
Palliative Medicine  Social Work Progress Note    Pt Name: Mina Montesgil Viramontes for Palliative Medicine met with pt at bedside along with PM NP. Pt too confused to be able to engage in conversation but denies any pain. LSW contacted pt's wife, Bhavani Obregon, by phone. She has a good understanding of pt's current condition and she wishes for [facility] placement. She states that she does not feel she would be able to care for pt at home without assistance due to her own health issues. Bhavani Obregon is pt's DPOA-HC. She will bring in copy of DPOA-HC and Living Will. She confirms that pt should be DNR-CCA. She is unsure about further need for intubation and will discuss further with her family. Bhavani Obregon advised she needed for speak with her daughter at this time, will address further goals with her as she is able.

## 2021-06-27 NOTE — PROGRESS NOTES
Interim events have been reviewed and discussed with the patient's primary care physician. Assessments of the palliative care service and present advanced directives have been reviewed with family wishes of transfer to an extended care facility. He appears confused and in no present distress with appropriate fluid mobilization occurring on present therapy and he is hemodynamically stable and maintaining sinus rhythm. A low-grade fever has been noted with laboratory assessment pending. His clinical examination has remained unchanged with no evidence of suggestion of volume overload. On a clinical basis, the patient presently appears compensated in the face of his coronary atherosclerosis and ischemic cardiomyopathy with mild to moderate left ventricular systolic dysfunction. On this basis, medical management appears advisable and present would continue his existing medical regimen. His overall prognosis as outlined remains guarded. Nutritional support will be essential to fluid mobilization as well as reducing risk of progressive debilitation. Presently, we will further evaluate him should additional cardiovascular difficulties or concerns arise.

## 2021-06-27 NOTE — PROGRESS NOTES
Jocelyn Gaston MD FACP  Progress notes      CHIEF COMPLAINT: Chest pain      HISTORY OF PRESENT ILLNESS:   1021/21   78year-old  woman in the emergency room at [facility] earlier this morning  The son and a registered nurse were at bedside  Data reviewed in detail  Spoke with the ER physician in person  Patient with known history of coronary artery disease had an angioplasty many years ago for the LAD  Recently hospitalized with acute cystitis  Refused to see cardiologist on that admission  Now presents with onset of anterior chest discomfort described as a heaviness that woke him up at 5 AM  Nitroglycerin sublingual apparently relieved the pain in the ER  Troponins BNP were markedly elevated along with EKG changes suggestive of acute non-ST elevation myocardial infarction  He also reports shortness of breath even at rest  6/22/2021  Patient was seen on the floor earlier this morning  No further angina  On IV heparin infusion  Tolerating medications well  Data reviewed in detail  6/23/21  Patient was seen on the floor earlier this morning  Son was at bedside  Overnight delirium confusion requiring restraints and Haldol  Upon explaining things to the patient he seemed to understand what I was saying  Coronary angiography showing multivessel disease with severe left ventricular dysfunction  Spoke with the son at length  6/24/2021  Patient was seen on the floor earlier this morning son at bedside  Overnight events noted that included delirium and agitation  Remains confused this morning  Lower extremity restraints on  Barrera catheter intact  Data reviewed in detail  He denied chest pain  6/25/21  Patient was seen on the floor earlier this afternoon  Scheduled for stenting of the coronary  Pain-free  Confused disoriented  In restraints  6/26/2021  78year-old man seen on the floor earlier this morning  Family members at bedside  Spoke with the nursing staff  On supplemental oxygen  Oriented to person BREAKFAST  oxybutynin (DITROPAN XL) 15 MG extended release tablet, Take 15 mg by mouth daily  valsartan (DIOVAN) 80 MG tablet, Take 1 tablet by mouth daily  amLODIPine (NORVASC) 5 MG tablet, TAKE 1 TABLET BY MOUTH EVERY DAY AT NIGHT  metoprolol tartrate (LOPRESSOR) 50 MG tablet, TAKE 1 TABLET BY MOUTH EVERY DAY  atorvastatin (LIPITOR) 40 MG tablet, TAKE 1 TABLET BY MOUTH EVERY DAY  dapagliflozin (FARXIGA) 10 MG tablet, TAKE 1 TABLET BY MOUTH EVERY DAY IN THE MORNING  vitamin E 400 UNIT capsule, Take 1 capsule by mouth daily  mirabegron (MYRBETRIQ) 25 MG TB24, Take 1 tablet by mouth three times a week Mon-Wed-& Friday  tamsulosin (FLOMAX) 0.4 MG capsule, TAKE 1 CAPSULE BY MOUTH EVERYDAY AT BEDTIME  blood glucose test strips (FREESTYLE TEST STRIPS) strip, Indications: freestyle test strips DX: E11.9 As needed. Diagnosis is E 11.9  FREESTYLE LANCETS MISC, 1 each by Does not apply route daily Indications: in AM DX: E11.9  Continuous Blood Gluc Sensor ([address] [place] [address]) MISC, 1 Units by Does not apply route 3 times daily  Continuous Blood Gluc  (FREESTYLE MAYELA READER) WALTER, 1 Units by Does not apply route 3 times daily e11.9  aspirin 81 MG EC tablet, Take 81 mg by mouth nightly  Coenzyme Q10 (CO Q-10) 100 MG CAPS, Take 100 mg by mouth nightly  Omega-3 Fatty Acids (FISH OIL) 300 MG CAPS, Take 1 capsule by mouth Daily with supper  vitamin B-12 (CYANOCOBALAMIN) 1000 MCG tablet, Take 1,000 mcg by mouth daily  Ascorbic Acid (VITAMIN C) 500 MG tablet, Take 500 mg by mouth daily  latanoprost (XALATAN) 0.005 % ophthalmic solution, Place 1 drop into the right eye nightly   vitamin D (ERGOCALCIFEROL) 1.25 MG (27220 UT) CAPS capsule, Take 1 capsule by mouth once a week for 12 doses (Patient taking differently: Take 50,000 Units by mouth once a week - Takes on Wednesday)    Allergies:    Ace inhibitors;  Anesthetics, amide; and Vicodin [hydrocodone-acetaminophen]    Social History:    reports that he quit smoking about 40 years ago. His smoking use included cigars. He started smoking about 67 years ago. He has a 120.00 pack-year smoking history. He quit smokeless tobacco use about 5 years ago. He reports that he does not drink alcohol and does not use drugs. Family History:   family history includes Heart Disease in his mother; Heart Disease (age of onset: 52) in his father. REVIEW OF SYSTEMS:  As above in the HPI, otherwise negative    PHYSICAL EXAM:    Vitals:  BP (!) 109/59   Pulse 97   Temp 97.8 °F (36.6 °C) (Oral)   Resp 18   Ht 5' 9\" (1.753 m)   Wt 236 lb (107 kg)   SpO2 98%   BMI 34.85 kg/m²     General:  Awake, alert, disoriented. Well developed, well nourished, well groomed. Morbidly obese  HEENT:  Normocephalic, atraumatic. Pupils equal, round, reactive to light. No scleral icterus. No conjunctival injection. .  No nasal discharge. Neck:  Supple  Heart:  RRR, no murmurs, gallops, rubs  Lungs: Scattered rails in the posterior lung fields noted although chest x-ray reveals no pulmonary edema  Abdomen:   Bowel sounds present, soft, nontender, no masses, no organomegaly, no peritoneal signs  Extremities:  No clubbing, cyanosis, or edema  Skin:  Warm and dry, no open lesions or rash  Neuro:  Cranial nerves 2-12 intact, no focal deficits  Breast: deferred  Rectal: deferred  Genitalia:  deferred    LABS: Data reviewed in detail      ASSESSMENT:    Loose stools reported with incontinence/discontinue Omnicef  Active Problems:    NSTEMI (non-ST elevated myocardial infarction) [facility] [address]  Acute CHF likely diastolic based on ischemic event  Multivessel coronary artery disease noted on angiography with a LV dysfunction  Acute delirium  Patient Active Problem List   Diagnosis    Hypertension    Hypercholesterolemia    CAD (coronary artery disease)    Benign prostatic hyperplasia    CTS (carpal tunnel syndrome)    Cervical spondylosis    DM type 2 (diabetes mellitus, type 2) ([address]    Exogenous obesity    OA (osteoarthritis)    Polyp of colon    Vitamin B12 deficiency    Vitamin D deficiency    Morbidly obese (HCC)    Asthmatic bronchitis, mild intermittent, uncomplicated    Chest pain    Generalized weakness    NSTEMI (non-ST elevated myocardial infarction) ([facility] [address]           PLAN:  Spoke with son and wife multiple times  He is not a candidate for any interventions  DNR CCA per family request  Palliative care consult appreciated  DC [address] [facility] Barrera catheter  Subacute rehab anticipated  Start Seroquel at bedtime  Medical management for coronary artery disease    Burak Partida MD  9:21 AM  6/27/2021

## 2021-06-28 NOTE — PROGRESS NOTES
Date: 6/27/2021    Time: 11:22 PM    Patient Placed On BIPAP/CPAP/ Non-Invasive Ventilation? Yes    If no must comment. Facial area red/color change? No           If YES are Blister/Lesion present? No   If yes must notify nursing staff  BIPAP/CPAP skin barrier?   Yes    Skin barrier type:mepilexlite       Comments:        Jose Massey RCP

## 2021-06-28 NOTE — PROGRESS NOTES
C Diff collection order discontinued, stool doesn't meet criteria, Dr. Dusty Jerez notified via [address] [place] [address].

## 2021-06-28 NOTE — PROGRESS NOTES
Physical Therapy    Name: Mery Salas  : 1940  MRN: [id number]      Date of Service: 2021    Evaluating PT:  Des Wong, PT [id number]      Room #:  4442/7745-N  Diagnosis:  NSTEMI (non-ST elevated myocardial infarction) [facility]) [I21.4]  PMHx/PSHx:     has a past surgical history that includes Diagnostic Cardiac Cath Lab Procedure ( approximately); eye surgery (Bilateral); Total shoulder arthroplasty (Left, ); Cataract removal with implant (Bilateral); Total knee arthroplasty (Right, 2015); Colonoscopy (10/31/2013); joint replacement (Right, 2015); and Cardiac catheterization (2021). has a past surgical history that includes Diagnostic Cardiac Cath Lab Procedure ( approximately); eye surgery (Bilateral); Total shoulder arthroplasty (Left, ); Cataract removal with implant (Bilateral); Total knee arthroplasty (Right, 2015); Colonoscopy (10/31/2013); joint replacement (Right, 2015); and Cardiac catheterization (2021). Procedure/Surgery:  Cardiac catheterization-2021  Precautions:  Falls, O2 , FWB (full weight bearing), restraints secondary to confusion pulling at lines. Equipment Needs: To be determined. SUBJECTIVE:    Patient lives with spouse  in a ranch home  with 2 steps to enter without 1Rail  Bed is on 1 floor and bath is on 1 floor. Patient ambulated with cane  PTA. Equipment owned: [place],    Wife provided information regarding home setup. OBJECTIVE:   Initial Evaluation  Date: 21 Treatment  21 Short Term/ Long Term   Goals   AM-PAC 6 Clicks 08/44 37/59    Was pt agreeable to Eval/treatment? yes yes    Does pt have pain? None stated. Bed Mobility  Rolling: Min  Supine to sit: Mod  Sit to supine: Mod  Scooting: Mod Rolling: Min  Supine to sit: Mod  Sit to supine: Mod  Scooting: Mod Rolling: Ind  Supine to sit: Ind  Sit to supine: Ind  Scooting: Ind   Transfers Sit to stand:  Mod   Stand to sit: Mod  Stand pivot: NT  Sit to stand: Mod   Stand to sit: Mod  Stand pivot: Mod with fww. Sit to stand: Mod Ind  Stand to sit: Mod Ind  Stand pivot: Mod Ind   Ambulation    side steps to [facility]. Flexed posture. Side steps to [facility] and side stepping. Required Mod A with fww. Cues for sequencing. 100 feet with device if needed if possible. Stair negotiation: ascended and descended  NT NT 2 steps with 1 rail Min   ROM BUE:  Defer to see OT eval  BLE:  wfl     Strength BUE: defer to  OT eval  RLE:  4/5  LLE:   4/5  4+/5   Balance Sitting EOB:  Min for safety  Dynamic Standing: Mod  Sitting EOB:  Ind  Dynamic Standing: Mod Ind     Patient is Alert & Oriented x person required constant cues to stay on task and follows directions   Sensation:  Pt denies numbness and tingling to extremities  Edema:  BLE. Vitals:  Supine Sitting   111/80 Blood Pressure at rest - 96/54    Heart Rate at rest 100 bpm   SPO2 at rest 96% 2L SPO2 at rest 96% 2L     Therapeutic Exercises:  Functional activity as stated above also see comments. Patient education  Pt educated on role of PT     Patient response to education:   Pt verbalized understanding Pt demonstrated skill Pt requires further education in this area   no no Reminders     ASSESSMENT:    Conditions Requiring Skilled Therapeutic Intervention:    [x]Decreased strength     [x]Decreased ROM  [x]Decreased functional mobility  [x]Decreased balance   [x]Decreased endurance   [x]Decreased posture  []Decreased sensation  []Decreased coordination   []Decreased vision  [x]Decreased safety awareness   []Increased pain       Comments:    RN cleared patient for participation in therapy session. Patient was seen this date for PT treatment. Patient was agreeable to intervention. Results of the functional assessment are noted above. Upon entering the room patient was found supine in bed. Assisted to Sat EOB x 10 minutes to increase dynamic sitting balance and activity tolerance.   Sit to stand device  [x] Stair Training in preparation for safe discharge home and/or into the community   [] Positioning to prevent skin breakdown and contractures  [x] Safety and Education Training   [] Patient/Caregiver Education   [] HEP  [] Other     PT long term treatment goals are located in above grid    Frequency of treatments: 2-5x/week x 1-2 weeks. Time in  1435  Time out  1500    Total Treatment Time  25 minutes     Evaluation Time includes thorough review of current medical information, gathering information on past medical history/social history and prior level of function, completion of standardized testing/informal observation of tasks, assessment of data and education on plan of care and goals.     CPT codes:  [] Low Complexity PT evaluation 11429  [] Moderate Complexity PT evaluation 68000  [] High Complexity PT evaluation 16574  [] PT Re-evaluation 02819  [] Gait training 66689 - minutes  [] Manual therapy 71925 - minutes  [x] Therapeutic activities 90461 25 minutes  [] Therapeutic exercises 37942 - minutes  [] Neuromuscular reeducation 47873 - minutes     Riccardo Lau, [address] [facility]

## 2021-06-28 NOTE — CARE COORDINATION
SOCIAL WORK/DISCHARGE PLANNING;  Received a call from pt's daughter Dashawn Sanchez. She reports family does not want [facility] first choice is [facility]. Per liaison, [facility] unable to accept as they do not have available bed. Informed daughter of this and second choice is [facility] at [facility]. Called liaison and left voice message re; referral. [place] ask that she or her brother Donell Marshall be contacted as pt's wife is in treatment herself and they are handling arrangements. lux will speak with her family re; other [facility] choices. Chela Medeiros [facility]  [phone]    Left message for [facility] re; referral for pt. Per [facility] [facility]. They will follow pt. No definitive decision on admission at this time. Chela Medeiros [facility]  [phone]    Per [facility], they will have a dementia bed at [facility] for pt. I informed pt's son who is in pt's room. He will inform his sister of plan for [facility] at discharge. Completed Hens/endanielleope in chart.   Chela Medeiros [facility]  [phone]

## 2021-06-28 NOTE — PROGRESS NOTES
Lexx Tam MD FACP  Progress notes      CHIEF COMPLAINT: Chest pain      HISTORY OF PRESENT ILLNESS:   1021/21   78year-old  woman in the emergency room at [facility] earlier this morning  The son and a registered nurse were at bedside  Data reviewed in detail  Spoke with the ER physician in person  Patient with known history of coronary artery disease had an angioplasty many years ago for the LAD  Recently hospitalized with acute cystitis  Refused to see cardiologist on that admission  Now presents with onset of anterior chest discomfort described as a heaviness that woke him up at 5 AM  Nitroglycerin sublingual apparently relieved the pain in the ER  Troponins BNP were markedly elevated along with EKG changes suggestive of acute non-ST elevation myocardial infarction  He also reports shortness of breath even at rest  6/22/2021  Patient was seen on the floor earlier this morning  No further angina  On IV heparin infusion  Tolerating medications well  Data reviewed in detail  6/23/21  Patient was seen on the floor earlier this morning  Son was at bedside  Overnight delirium confusion requiring restraints and Haldol  Upon explaining things to the patient he seemed to understand what I was saying  Coronary angiography showing multivessel disease with severe left ventricular dysfunction  Spoke with the son at length  6/24/2021  Patient was seen on the floor earlier this morning son at bedside  Overnight events noted that included delirium and agitation  Remains confused this morning  Lower extremity restraints on  Barrera catheter intact  Data reviewed in detail  He denied chest pain  6/25/21  Patient was seen on the floor earlier this afternoon  Scheduled for stenting of the coronary  Pain-free  Confused disoriented  In restraints  6/26/2021  78year-old man seen on the floor earlier this morning  Family members at bedside  Spoke with the nursing staff  On supplemental oxygen  Oriented to person only  Tolerating medications  Mental status somewhat better  2021  Patient was seen on the floor earlier this morning  Nursing staff at bedside  Sitter at bedside  Remains disoriented  Spoke with cardiology  No further chest pain or shortness of breath  Did not sleep well due to agitation  2021  Patient was seen on the floor earlier this morning  Somewhat sleepy  Sitter at bedside  Easily arousable  Denies any complaints  Disoriented  Past Medical History:    Past Medical History:   Diagnosis Date    Arthritis     bilateral knee    BPH (benign prostatic hypertrophy) 2016    CAD (coronary artery disease)     Cervical spondylosis 2016    Constipation     prune juice in am    CTS (carpal tunnel syndrome) 2016    Diabetes mellitus ([facility] [address]     DM type 2 (diabetes mellitus, type 2) ([facility] [address] 2016    Exogenous obesity 2016    Glaucoma     bilateral    History of EKG 10/14/2015 per Dr Chinedu Kaiser on chart.     Hypercholesterolemia     Hypertension     OA (osteoarthritis) 2016    Partial blindness     left eye    Polyp of colon 2016    PONV (postoperative nausea and vomiting)     Preoperative clearance 2015    per Dr Chinedu Kaiser on chart; for right tjak Dr Rosalind Murphy Vitamin B12 deficiency 2016    Vitamin D deficiency 2016    Wears glasses        Past Surgical History:    Past Surgical History:   Procedure Laterality Date    CARDIAC CATHETERIZATION  2021    [place]    CATARACT REMOVAL WITH IMPLANT Bilateral     COLONOSCOPY  10/31/2013    DIAGNOSTIC CARDIAC CATH LAB PROCEDURE   approximately    ? stent    EYE SURGERY Bilateral     cataract    JOINT REPLACEMENT Right 2015    SHOULDER ARTHROPLASTY Left     TOTAL KNEE ARTHROPLASTY Right 2015       Medications Prior to Admission:    Medications Prior to Admission: metFORMIN (GLUCOPHAGE-XR) 500 MG extended release tablet, Take 1 tablet by mouth 3 times daily (with meals)  [] cefdinir (OMNICEF) 300 MG capsule, Take 1 capsule by mouth 2 times daily for 10 days  pantoprazole (PROTONIX) 40 MG tablet, TAKE 1 TABLET BY MOUTH EVERY DAY BEFORE BREAKFAST  oxybutynin (DITROPAN XL) 15 MG extended release tablet, Take 15 mg by mouth daily  valsartan (DIOVAN) 80 MG tablet, Take 1 tablet by mouth daily  amLODIPine (NORVASC) 5 MG tablet, TAKE 1 TABLET BY MOUTH EVERY DAY AT NIGHT  metoprolol tartrate (LOPRESSOR) 50 MG tablet, TAKE 1 TABLET BY MOUTH EVERY DAY  atorvastatin (LIPITOR) 40 MG tablet, TAKE 1 TABLET BY MOUTH EVERY DAY  dapagliflozin (FARXIGA) 10 MG tablet, TAKE 1 TABLET BY MOUTH EVERY DAY IN THE MORNING  vitamin E 400 UNIT capsule, Take 1 capsule by mouth daily  mirabegron (MYRBETRIQ) 25 MG TB24, Take 1 tablet by mouth three times a week Mon-Wed-& Friday  tamsulosin (FLOMAX) 0.4 MG capsule, TAKE 1 CAPSULE BY MOUTH EVERYDAY AT BEDTIME  blood glucose test strips (FREESTYLE TEST STRIPS) strip, Indications: freestyle test strips DX: E11.9 As needed.     Diagnosis is E 11.9  FREESTYLE LANCETS MISC, 1 each by Does not apply route daily Indications: in AM DX: E11.9  Continuous Blood Gluc Sensor ([address] [place] [address]) MISC, 1 Units by Does not apply route 3 times daily  Continuous Blood Gluc  (FREESTYLE MAYELA READER) WALTER, 1 Units by Does not apply route 3 times daily e11.9  aspirin 81 MG EC tablet, Take 81 mg by mouth nightly  Coenzyme Q10 (CO Q-10) 100 MG CAPS, Take 100 mg by mouth nightly  Omega-3 Fatty Acids (FISH OIL) 300 MG CAPS, Take 1 capsule by mouth Daily with supper  vitamin B-12 (CYANOCOBALAMIN) 1000 MCG tablet, Take 1,000 mcg by mouth daily  Ascorbic Acid (VITAMIN C) 500 MG tablet, Take 500 mg by mouth daily  latanoprost (XALATAN) 0.005 % ophthalmic solution, Place 1 drop into the right eye nightly   vitamin D (ERGOCALCIFEROL) 1.25 MG (95591 UT) CAPS capsule, Take 1 capsule by mouth once a week for 12 doses (Patient taking differently: Take 50,000 Units by mouth once a week - Takes on Wednesday)    Allergies:    Ace inhibitors; Anesthetics, amide; and Vicodin [hydrocodone-acetaminophen]    Social History:    reports that he quit smoking about 40 years ago. His smoking use included cigars. He started smoking about 67 years ago. He has a 120.00 pack-year smoking history. He quit smokeless tobacco use about 5 years ago. He reports that he does not drink alcohol and does not use drugs. Family History:   family history includes Heart Disease in his mother; Heart Disease (age of onset: 52) in his father. REVIEW OF SYSTEMS:  As above in the HPI, otherwise negative    PHYSICAL EXAM:    Vitals:  BP (!) 118/56   Pulse 70   Temp 98.2 °F (36.8 °C) (Axillary)   Resp 16   Ht 5' 9\" (1.753 m)   Wt 236 lb (107 kg)   SpO2 96%   BMI 34.85 kg/m²     General: Lethargic but easily arousable  Disoriented  . Well developed, well nourished, well groomed. Morbidly obese  HEENT:  Normocephalic, atraumatic. Pupils equal, round, reactive to light. No scleral icterus. No conjunctival injection. .  No nasal discharge. Neck:  Supple  Heart:  RRR, no murmurs, gallops, rubs  Lungs: Scattered rails in the posterior lung fields noted although chest x-ray reveals no pulmonary edema  Abdomen:   Bowel sounds present, soft, nontender, no masses, no organomegaly, no peritoneal signs  Extremities:  No clubbing, cyanosis, or edema  Skin:  Warm and dry, no open lesions or rash  Neuro:  Cranial nerves 2-12 intact, no focal deficits  Breast: deferred  Rectal: deferred  Genitalia:  deferred    LABS: Data reviewed in detail      ASSESSMENT:    Loose stools reported with incontinence/discontinue Omnicef  Active Problems:    NSTEMI (non-ST elevated myocardial infarction) ([facility] [address]  Acute CHF likely diastolic based on ischemic event  Multivessel coronary artery disease noted on angiography with a LV dysfunction  Acute delirium  Patient Active Problem List   Diagnosis    Hypertension    Hypercholesterolemia    CAD (coronary artery disease)    Benign prostatic hyperplasia    CTS (carpal tunnel syndrome)    Cervical spondylosis    DM [facility] type 2) ([facility]    Exogenous obesity    OA (osteoarthritis)    Polyp of colon    Vitamin B12 deficiency    Vitamin D deficiency    Morbidly obese (HCC)    Asthmatic bronchitis, mild intermittent, uncomplicated    Chest pain    Generalized weakness    NSTEMI (non-ST elevated myocardial infarction) ([facility] [address]           PLAN:  Spoke with son and wife multiple times  Proceed with placement to subacute rehab  Medical management for coronary artery disease    Jl Sousa MD  1:19 PM  6/28/2021

## 2021-06-28 NOTE — PROGRESS NOTES
to SNF. Physical Function:  PPS: 30     REVIEW OF SYSTEMS:   Pertinent positives as noted in the HPI. All other systems reviewed and negative. OBJECTIVE:   Prognosis: Poor and Guarded    Physical Exam:  BP (!) 118/56   Pulse 70   Temp 98.2 °F (36.8 °C) (Axillary)   Resp 16   Ht 5' 9\" (1.753 m)   Wt 236 lb (107 kg)   SpO2 96%   BMI 34.85 kg/m²     Constitutional:  Elderly, obese, NAD, awake, alert  Eyes: no scleral icterus, normal lids, no discharge  ENMT:  Normocephalic, atraumatic, mucosa moist, EOMI  Neck:  trachea midline, no JVD  Lungs:  CTA bilaterally, no audible rhonchi or wheezes noted, respirations unlabored, no retractions  Heart[de-identified]  RRR, distant heart tones, no murmur, rub, or gallop noted during exam  Abd:  Soft, non tender, non distended, bowel sounds present  :  deferred  MSK: sarcopenia presen  Ext:  Limited movement of extremities, no edema, pulses present  Skin:  Warm and dry, no rashes on visible skin  Psych: irritable  Neuro:  PERRL, Alert, grossly nonfocal; following commands    Objective data reviewed: labs, images, records, medication use, vitals and chart    Labs:     Recent Labs     06/27/21  1151   WBC 8.4   HGB 7.3*   HCT 22.3*        Recent Labs     06/26/21  0737 06/27/21  1151 06/28/21  0707    137 143   K 3.5 4.0 3.9   CL 99 100 102   CO2 28 27 28   BUN 44* 45* 46*   CREATININE 1.3* 1.3* 1.3*   CALCIUM 8.3* 8.5* 8.5*     Recent Labs     06/26/21  0737 06/27/21  1151 06/28/21  0707   AST 39 20 15   ALT 95* 61* 48*   BILITOT 0.9 0.9 0.8   ALKPHOS 178* 173* 166*     No results for input(s): INR in the last 72 hours. No results for input(s): Les Karlee in the last 72 hours.     Urinalysis:      Lab Results   Component Value Date    NITRU POSITIVE 06/15/2021    WBCUA 10-20 06/15/2021    BACTERIA MODERATE 06/15/2021    RBCUA >20 06/15/2021    BLOODU LARGE 06/15/2021    SPECGRAV >=1.030 06/15/2021    GLUCOSEU 500 06/15/2021         Discussed patient and the plan of care with the other IDT members: Palliative Medicine IDT Team, Floor Nurse, Patient and Family    Time/Communication  Greater than 50% of time spent, total 35 minutes in counseling and coordination of care at the bedside regarding goals of care, diagnosis and prognosis and see above. Thank you for allowing Palliative Medicine to participate in the care of Suzanne Hull [facility]

## 2021-06-28 NOTE — PROGRESS NOTES
Occupational Therapy  OCCUPATIONAL THERAPY treatment note  [address] [facility] [address] [address], [address] [place] [address],[facility] [address]                                                Patient Name: Joshua Bowden  MRN: [id number]  : 1940  Room: [address] [place] [address]    Per eval:  Evaluating OT: Franci Hammond OTR/L #[id number]     Referring Provider: Ta Carter MD  Specific Provider Orders/Date: OT eval and treat 21    Diagnosis: NSTEMI   Pt admitted to hospital d/t chest pain    Surgery / Procedure: 21:  Right radial heart cath placed    Pertinent Medical History: Arthritis, BPH, CAD, CTS, Glaucoma, OA, partial blindness (left eye), HTN    Precautions:  Fall Risk, O2, TAPS, Bed Alarm, Cognition    Assessment of current deficits    [x] Functional mobility  [x]ADLs  [x] Strength               [x]Cognition    [x] Functional transfers   [x] IADLs         [x] Safety Awareness   [x]Endurance    [] Fine Coordination              [x] Balance      [] Vision/perception   []Sensation     []Gross Motor Coordination  [] ROM  [] Delirium                   [] Motor Control     OT PLAN OF CARE   OT POC based on physician orders, patient diagnosis and results of clinical assessment    Frequency/Duration: 1-3 days/wk for 2 weeks PRN   Specific OT Treatment Interventions to include:   * Instruction/training on adapted ADL techniques and AE recommendations to increase functional independence within precautions       * Training on energy conservation strategies, correct breathing pattern and techniques to improve independence/tolerance for self-care routine  * Functional transfer/mobility training/DME recommendations for increased independence, safety, and fall prevention  * Patient/Family education to increase follow through with safety techniques and functional independence  * Recommendation of environmental modifications for increased safety with functional transfers/mobility and ADLs  * Cognitive retraining/development of therapeutic activities to improve problem solving, judgement, memory, and attention for increased safety/participation in ADL/IADL tasks  * Therapeutic activities to facilitate/challenge dynamic balance, stand tolerance for increased safety and independence with ADLs  * Therapeutic activities to facilitate gross/fine motor skills for increased independence with ADLs  * Positioning to improve skin integrity, interaction with environment and functional independence    Recommended Adaptive Equipment: TBD     Pt is poor historian    Home Living: Pt is poor historian, Pt wife stated: Pt lives with his wife in a 1 story home with 2 CLAIR (1HR) to enter   Bathroom setup: walk-in shower    Equipment owned: sc, w/w, cane, BSC    Prior Level of Function: Independent with ADLs , Independent with IADLs; ambulated with cane   Driving: no   Occupation: retired vieira    Pain Level: Pt denies pain this session    Cognition: A&O: 1/4 (self pnly); Follows 1-2 step directions   Memory:  Poor   Sequencing:  Poor   Problem solving:  Poor   Judgement/safety:  Poor     Functional Assessment:  AM-PAC Daily Activity Raw Score: 14/24   Initial Eval Status  Date: 6/23/21 Treatment Status  Date:6/28/21 STGs = LTGs  Time frame: 10-14 days   Feeding Stand by Assist  SBA Modified [place]   Grooming Stand by Assist  Min A    Seated at EOB; cuing on sequencing  Modified [place]    UB Dressing Minimal Assist  Min A     Donning of robe; assist around back - cuing on sequencing and safety  Modified [place]    LB Dressing Maximal Assist  Max A    Socks; cuing on technique and sequencing Minimal Assist    Bathing Dependent  D/t cognition Max A  Mod Assist    Toileting Dependent   D/t cognition Dependent    Incontinent of BM - dependent for hygiene   Moderate Assist    Bed Mobility  Supine to sit: Moderate Assist   Sit to supine:  Moderate Assist  Maximal Assist  Supine to sit: Stand by Assist Sit to supine: Stand by Assist    Functional Transfers Moderate Assist x2   Sit<>stand with HHA Mod A    Various surfaces; cuing on hand placement, body mechanics and safety Minimal Assist    Functional Mobility Moderate Assist   Functional side steps to [facility] with HHA Mod A     Ambulated short distance with w/w ; limited due to fatigue / weakness; cuing on posture, w/w management and safety. Stand by Assist    Balance Sitting:     Static:  SBA    Dynamic:SBA  Standing: Mod A Sitting: SBA  Standing: Mod A at w/w Sitting:     Static:  Mod I    Dynamic:Mod I  Standing: SBA   Activity Tolerance P+ Fair-    See BP below Fair   Visual/  Perceptual Glasses: yes, wfl              .  Vitals:  Seated at EOB: 69049, HR 74  Following stand pivot:  96/54, HR 76  Semi-supine in bed:  105/55, HR 73  Nursing notified    Hand Dominance Right   AROM (PROM) Strength Additional Info:    RUE  WFL 4/5 good  and wfl FMC/dexterity noted during ADL tasks       LUE WFL 4/5 good  and wfl FMC/dexterity noted during ADL tasks       Hearing: WFL   Sensation:  No c/o numbness or tingling   Tone: WFL   Edema: WFL    Comments: Upon arrival pt supine in bed and agreeable to OT session - son present during session. At end of session semi-supine in bed (bed alarm on) with all lines intact, call light within reach. Treatment: Facilitation of bed mobility, sitting balance at EOB (impacting ADLs; addressing posture, weight shifting, dynamic reaching), functional transfers (various surfaces: bed, chair; sit to stands / stand pivots), standing tolerance tasks at w/w (addressing posture, balance and activity tolerance; impacting ADLs and functional activity) and several steps to/from chair with w/w (inpreparation for ambulation to/ from bathroom and for item retrieval tasks; cuing on posture, w/w management and safety) - skilled cuing on sequencing, hand placement, posture, body mechanics, energy conservation techniques and safety.   Therapist facilitated self-care retraining: UB/LB self-care tasks (robe, socks), toileting hygiene task and seated grooming tasks while educating pt on modified techniques, posture, safety and energy conservation techniques. Skilled monitoring of HR, O2 sats and pts response to treatment. · Pt has made fair progress towards set goals. · Continue with current plan of care: addressing adl retraining, transfer training and functional ambulation.      Time In: 1435  Time Out: 1505  Total Treatment Time: 24 minutes    Min Units   OT Eval Low 33886       OT Eval Medium 43445     OT Eval High 43316     OT Re-Eval [id number]     Therapeutic Ex 10000     Therapeutic Activities 78614 14 1   ADL/Self Care 39079 10 1   Orthotic Management 54020       Manual 12819     Neuro Re-Ed 08409       Non-Billable Time          Sheila Bhatia OTR/L #[id number]

## 2021-06-28 NOTE — PROGRESS NOTES
Comprehensive Nutrition Assessment    Type and Reason for Visit:  Initial    Nutrition Recommendations/Plan: Continue current diet, Start Glucerna BID    Nutrition Assessment:  Pt admit w/ NSTEMI, acute delirium/AMS s/p angio. Hx CHF, DM2. Pt w/ decreased PO intake since admit. Will add ONS and continue to monitor. Malnutrition Assessment:  Malnutrition Status: At risk for malnutrition (Comment)    Context:  Acute Illness     Findings of the 6 clinical characteristics of malnutrition:  Energy Intake:  1 - 75% or less of estimated energy requirements for 7 or more days  Weight Loss:  No significant weight loss     Body Fat Loss:  No significant body fat loss     Muscle Mass Loss:  No significant muscle mass loss    Fluid Accumulation:  No significant fluid accumulation     Strength:  Not Performed    Estimated Daily Nutrient Needs:  Energy (kcal):  9247-9846; Weight Used for Energy Requirements:  Current     Protein (g):   (1.3-1.5); Weight Used for Protein Requirements:  Ideal        Fluid (ml/day):  5588-4761; Method Used for Fluid Requirements:  1 ml/kcal      Nutrition Related Findings:  AMS, active BS, soft abd, +1 edema, -I/Os      Wounds:  None       Current Nutrition Therapies:    ADULT DIET; Regular; 5 carb choices (75 gm/meal); Low Sodium (2 gm)    Anthropometric Measures:  · Height: 5' 9\" (175.3 cm)  · Current Body Weight: 236 lb (107 kg) (6/27 bed)   · Admission Body Weight: 246 lb (111.6 kg) (6/24 bed)    · Usual Body Weight: 241 lb (109.3 kg) (12/2020 bed scale, per EMR)     · Ideal Body Weight: 160 lbs; % Ideal Body Weight 147.5 %   · BMI: 34.8  · BMI Categories: Obese Class 1 (BMI 30.0-34. 9)       Nutrition Diagnosis:   · Inadequate oral intake related to cognitive or neurological impairment as evidenced by intake 26-50%    Nutrition Interventions:   Food and/or Nutrient Delivery:  Continue Current Diet, Start Oral Nutrition Supplement (glucerna BID)  Nutrition Education/Counseling: Education not indicated   Coordination of Nutrition Care:  Continue to monitor while inpatient    Goals:  pt to consume >75% meals/ONS       Nutrition Monitoring and Evaluation:   Food/Nutrient Intake Outcomes:  Food and Nutrient Intake, Supplement Intake  Physical Signs/Symptoms Outcomes:  Biochemical Data, GI Status, Fluid Status or Edema, Nutrition Focused Physical Findings, Skin, Weight     Electronically signed by Jen Jacobs MS, RD, LD on 6/28/21 at 4:00 PM EDT    Contact: [phone]

## 2021-06-29 NOTE — PROGRESS NOTES
Results for Chase Zurita (MRN [id number]) as of 6/29/2021 07:44   Ref. Range 6/28/2021 11:48 6/28/2021 16:09 6/28/2021 20:42 6/29/2021 05:21 6/29/2021 05:59   Hemoglobin Quant Latest Ref Range: 12.5 - 16.5 g/dL    7.0 (L)      Reported to Dr. Yonny Singh via [employer], will continue to monitor.

## 2021-06-29 NOTE — PROGRESS NOTES
Date: 6/28/2021    Time: 10:45 PM    Patient Placed On BIPAP/CPAP/ Non-Invasive Ventilation? Yes    If no must comment. Facial area red/color change? No           If YES are Blister/Lesion present? No   If yes must notify nursing staff  BIPAP/CPAP skin barrier?   Yes    Skin barrier type:mepilexlite       Comments:        Benjamín Newby RCP

## 2021-06-29 NOTE — PROGRESS NOTES
Palliative Care Department  [phone]  Palliative Care Progress Note  Provider 535Dayna Nieto  [id number]  Hospital Day: 9  Date of Initial Consult: 6/25/2021  Referring Provider: Pierre Garcia MD  Palliative Medicine was consulted for assistance with: [address], code status, family support    Brief Summary of Hospital Course:   Harpal Meza is a 78 y.o. with a medical history of CAD who was admitted on 6/21/2021 from home with a CHIEF COMPLAINT of chest pain. ASSESSMENT/PLAN:     Pertinent Hospital Diagnoses      NSTEMI   Acute respiratory insufficiency      Palliative Care Encounter / Counseling Regarding Goals of Care  Please see detailed goals of care discussion as below   At this time, Harpal Meza, Does Not have capacity for medical decision-making. Capacity is time limited and situation/question specific   During encounter wife was surrogate medical decision-maker   Outcome of goals of care meeting: family wants patient to go to SNF   Code status DNR-CCA   Advanced Directives: no POA or living will in epic   Surrogate/Legal NOK:  o Wife Anju Díaz [phone]  o Son Jazmyn Griffith [phone]  o Daughter Kavin Saba [phone]    Spiritual assessment: no spiritual distress identified  Bereavement and grief: to be determined  Referrals to: none today    SUBJECTIVE:     Details of Conversation: Per  plan is for discharge to [address] [place] [address] with dementia bed when stable for d/c.  Spoke with son at bedside as well as patient. Patient continues to pull his oxygen off but will replace it when reminded to. GOC and code status established. No further acute PM needs identified, will sign off at this time. Physical Function:  PPS: 30     REVIEW OF SYSTEMS:   Pertinent positives as noted in the HPI. All other systems reviewed and negative.     OBJECTIVE:   Prognosis: Poor and Guarded    Physical Exam:  BP (!) 106/56   Pulse 79   Temp 97.6 °F (36.4 °C) (Oral)   Resp 20   Ht 5' 9\" (1.753 m)   Wt 236 lb (107 kg)   SpO2 97%   BMI 34.85 kg/m²     Constitutional:  Elderly, obese, NAD, awake, confused  Eyes: no scleral icterus, normal lids, no discharge  ENMT:  Normocephalic, atraumatic, mucosa moist, EOMI  Neck:  trachea midline, no JVD  Lungs:  CTA bilaterally, no audible rhonchi or wheezes noted, respirations unlabored, no retractions  Heart[de-identified]  RRR, distant heart tones, no murmur, rub, or gallop noted during exam  Abd:  Soft, non tender, obese, non distended, bowel sounds present  :  deferred  MSK: sarcopenia present  Ext:  Limited movement of extremities, no edema, pulses present  Skin:  Warm and dry, no rashes on visible skin  Psych: more cooperative today  Neuro:  PERRL, confused, grossly nonfocal; following commands    Objective data reviewed: labs, images, records, medication use, vitals and chart    Labs:     Recent Labs     06/27/21  1151 06/29/21  0521   WBC 8.4 5.9   HGB 7.3* 7.0*   HCT 22.3* 21.4*    190     Recent Labs     06/27/21  1151 06/28/21  0707 06/29/21  0521    143 137   K 4.0 3.9 3.9    102 97*   CO2 27 28 29   BUN 45* 46* 39*   CREATININE 1.3* 1.3* 1.3*   CALCIUM 8.5* 8.5* 8.2*     Recent Labs     06/27/21  1151 06/28/21  0707 06/29/21  0521   AST 20 15 13   ALT 61* 48* 35   BILITOT 0.9 0.8 0.9   ALKPHOS 173* 166* 149*     No results for input(s): INR in the last 72 hours. No results for input(s): Riccardo Snowball in the last 72 hours.     Urinalysis:      Lab Results   Component Value Date    NITRU POSITIVE 06/15/2021    WBCUA 10-20 06/15/2021    BACTERIA MODERATE 06/15/2021    RBCUA >20 06/15/2021    BLOODU LARGE 06/15/2021    SPECGRAV >=1.030 06/15/2021    GLUCOSEU 500 06/15/2021         Discussed patient and the plan of care with the other IDT members: Palliative Medicine IDT Team, Floor Nurse, Patient and Family    Time/Communication  Greater than 50% of time spent, total 15 minutes in counseling and coordination of care at the bedside regarding goals of care, diagnosis and prognosis and see above. Thank you for allowing Palliative Medicine to participate in the care of Suzanne Persaud

## 2021-06-29 NOTE — PROGRESS NOTES
Claudell Cancer, MD FACP  Progress notes      CHIEF COMPLAINT: Chest pain      HISTORY OF PRESENT ILLNESS:   1021/21   78year-old  woman in the emergency room at [facility] earlier this morning  The son and a registered nurse were at bedside  Data reviewed in detail  Spoke with the ER physician in person  Patient with known history of coronary artery disease had an angioplasty many years ago for the LAD  Recently hospitalized with acute cystitis  Refused to see cardiologist on that admission  Now presents with onset of anterior chest discomfort described as a heaviness that woke him up at 5 AM  Nitroglycerin sublingual apparently relieved the pain in the ER  Troponins BNP were markedly elevated along with EKG changes suggestive of acute non-ST elevation myocardial infarction  He also reports shortness of breath even at rest  6/22/2021  Patient was seen on the floor earlier this morning  No further angina  On IV heparin infusion  Tolerating medications well  Data reviewed in detail  6/23/21  Patient was seen on the floor earlier this morning  Son was at bedside  Overnight delirium confusion requiring restraints and Haldol  Upon explaining things to the patient he seemed to understand what I was saying  Coronary angiography showing multivessel disease with severe left ventricular dysfunction  Spoke with the son at length  6/24/2021  Patient was seen on the floor earlier this morning son at bedside  Overnight events noted that included delirium and agitation  Remains confused this morning  Lower extremity restraints on  Barrera catheter intact  Data reviewed in detail  He denied chest pain  6/25/21  Patient was seen on the floor earlier this afternoon  Scheduled for stenting of the coronary  Pain-free  Confused disoriented  In restraints  6/26/2021  78year-old man seen on the floor earlier this morning  Family members at bedside  Spoke with the nursing staff  On supplemental oxygen  Oriented to person only  Tolerating medications  Mental status somewhat better  6/27/2021  Patient was seen on the floor earlier this morning  Nursing staff at bedside  Sitter at bedside  Remains disoriented  Spoke with cardiology  No further chest pain or shortness of breath  Did not sleep well due to agitation  6/28/2021  Patient was seen on the floor earlier this morning  Somewhat sleepy  Sitter at bedside  Easily arousable  Denies any complaints  Disoriented  6/29/2021  Patient was seen on the floor earlier this morning  Spoke with the son  Spoke with the nursing staff  His mentation is somewhat better with less of agitation and confusion  Still disoriented  Overall somewhat better  Past Medical History:    Past Medical History:   Diagnosis Date    Arthritis     bilateral knee    BPH (benign prostatic hypertrophy) 8/4/2016    CAD (coronary artery disease)     Cervical spondylosis 8/4/2016    Constipation     prune juice in am    CTS (carpal tunnel syndrome) 8/4/2016    [facility] [address] [address]     DM type 2 (diabetes mellitus, type 2) ([facility] [address] 8/4/2016    Exogenous obesity 8/4/2016    Glaucoma     bilateral    History of EKG 10/14/2015 per Dr Luciano Cabrales on chart.     Hypercholesterolemia     Hypertension     OA (osteoarthritis) 8/4/2016    Partial blindness     left eye    Polyp of colon 8/4/2016    PONV (postoperative nausea and vomiting)     Preoperative clearance 11/12/2015    per Dr Luciano Cabrales on chart; for right tjak Dr Naye Condon Vitamin B12 deficiency 8/4/2016    Vitamin D deficiency 8/4/2016    Wears glasses        Past Surgical History:    Past Surgical History:   Procedure Laterality Date    CARDIAC CATHETERIZATION  06/22/2021    [place]    CATARACT REMOVAL WITH IMPLANT Bilateral     COLONOSCOPY  10/31/2013    DIAGNOSTIC CARDIAC CATH LAB PROCEDURE  1990 approximately    ? stent    EYE SURGERY Bilateral     cataract    JOINT REPLACEMENT Right 12/08/2015    SHOULDER ARTHROPLASTY Left 2000    TOTAL KNEE ARTHROPLASTY Right 2015       Medications Prior to Admission:    Medications Prior to Admission: metFORMIN (GLUCOPHAGE-XR) 500 MG extended release tablet, Take 1 tablet by mouth 3 times daily (with meals)  [] cefdinir (OMNICEF) 300 MG capsule, Take 1 capsule by mouth 2 times daily for 10 days  pantoprazole (PROTONIX) 40 MG tablet, TAKE 1 TABLET BY MOUTH EVERY DAY BEFORE BREAKFAST  oxybutynin (DITROPAN XL) 15 MG extended release tablet, Take 15 mg by mouth daily  valsartan (DIOVAN) 80 MG tablet, Take 1 tablet by mouth daily  amLODIPine (NORVASC) 5 MG tablet, TAKE 1 TABLET BY MOUTH EVERY DAY AT NIGHT  metoprolol tartrate (LOPRESSOR) 50 MG tablet, TAKE 1 TABLET BY MOUTH EVERY DAY  atorvastatin (LIPITOR) 40 MG tablet, TAKE 1 TABLET BY MOUTH EVERY DAY  dapagliflozin (FARXIGA) 10 MG tablet, TAKE 1 TABLET BY MOUTH EVERY DAY IN THE MORNING  vitamin E 400 UNIT capsule, Take 1 capsule by mouth daily  mirabegron (MYRBETRIQ) 25 MG TB24, Take 1 tablet by mouth three times a week Mon-Wed-& Friday  tamsulosin (FLOMAX) 0.4 MG capsule, TAKE 1 CAPSULE BY MOUTH EVERYDAY AT BEDTIME  blood glucose test strips (FREESTYLE TEST STRIPS) strip, Indications: freestyle test strips DX: E11.9 As needed.     Diagnosis is E 11.9  FREESTYLE LANCETS MISC, 1 each by Does not apply route daily Indications: in AM DX: E11.9  Continuous Blood Gluc Sensor ([address] [place] [address]) MISC, 1 Units by Does not apply route 3 times daily  Continuous Blood Gluc  (FREESTYLE MAYELA READER) WALTER, 1 Units by Does not apply route 3 times daily e11.9  aspirin 81 MG EC tablet, Take 81 mg by mouth nightly  Coenzyme Q10 (CO Q-10) 100 MG CAPS, Take 100 mg by mouth nightly  Omega-3 Fatty Acids (FISH OIL) 300 MG CAPS, Take 1 capsule by mouth Daily with supper  vitamin B-12 (CYANOCOBALAMIN) 1000 MCG tablet, Take 1,000 mcg by mouth daily  Ascorbic Acid (VITAMIN C) 500 MG tablet, Take 500 mg by mouth daily  latanoprost (XALATAN) 0.005 % ophthalmic solution, Place 1 drop into the right eye nightly   vitamin D (ERGOCALCIFEROL) 1.25 MG (54192 UT) CAPS capsule, Take 1 capsule by mouth once a week for 12 doses (Patient taking differently: Take 50,000 Units by mouth once a week - Takes on Wednesday)    Allergies:    Ace inhibitors; Anesthetics, amide; and Vicodin [hydrocodone-acetaminophen]    Social History:    reports that he quit smoking about 40 years ago. His smoking use included cigars. He started smoking about 67 years ago. He has a 120.00 pack-year smoking history. He quit smokeless tobacco use about 5 years ago. He reports that he does not drink alcohol and does not use drugs. Family History:   family history includes Heart Disease in his mother; Heart Disease (age of onset: 52) in his father. REVIEW OF SYSTEMS:  As above in the HPI, otherwise negative    PHYSICAL EXAM:    Vitals:  BP (!) 109/55   Pulse 71   Temp 98.1 °F (36.7 °C) (Oral)   Resp 18   Ht 5' 9\" (1.753 m)   Wt 236 lb (107 kg)   SpO2 98%   BMI 34.85 kg/m²     General: Lethargic but easily arousable  Disoriented  . Well developed, well nourished, well groomed. Morbidly obese  HEENT:  Normocephalic, atraumatic. Pupils equal, round, reactive to light. No scleral icterus. No conjunctival injection. .  No nasal discharge. Neck:  Supple  Heart:  RRR, no murmurs, gallops, rubs  Lungs: Scattered rails in the posterior lung fields noted although chest x-ray reveals no pulmonary edema  Abdomen:   Bowel sounds present, soft, nontender, no masses, no organomegaly, no peritoneal signs  Extremities:  No clubbing, cyanosis, or edema  Skin:  Warm and dry, no open lesions or rash  Neuro:  Cranial nerves 2-12 intact, no focal deficits  Breast: deferred  Rectal: deferred  Genitalia:  deferred    LABS: Data reviewed in detail      ASSESSMENT:    Anemia of chronic disease i.e. CKD  Loose stools reported with incontinence/discontinue Omnicef  Active Problems:    NSTEMI (non-ST elevated myocardial

## 2021-06-29 NOTE — PROGRESS NOTES
Patients family complained of patient coughing at bedside and wants to speak with Dr. Dougie Miles regarding the lower HGB he saw on Mychart today. Called and reported to Dr. Dougie Miles, took verbal orders for Robitussin PRN, entered order as received via Telephone. Dr. Dougie Miles to come to beside and discuss HGB with patients son. Will continue to monitor.

## 2021-06-30 NOTE — PROGRESS NOTES
Ryan Thomas MD FACP  Progress notes      CHIEF COMPLAINT: Chest pain      HISTORY OF PRESENT ILLNESS:   1021/21   78year-old  woman in the emergency room at [facility] earlier this morning  The son and a registered nurse were at bedside  Data reviewed in detail  Spoke with the ER physician in person  Patient with known history of coronary artery disease had an angioplasty many years ago for the LAD  Recently hospitalized with acute cystitis  Refused to see cardiologist on that admission  Now presents with onset of anterior chest discomfort described as a heaviness that woke him up at 5 AM  Nitroglycerin sublingual apparently relieved the pain in the ER  Troponins BNP were markedly elevated along with EKG changes suggestive of acute non-ST elevation myocardial infarction  He also reports shortness of breath even at rest  6/22/2021  Patient was seen on the floor earlier this morning  No further angina  On IV heparin infusion  Tolerating medications well  Data reviewed in detail  6/23/21  Patient was seen on the floor earlier this morning  Son was at bedside  Overnight delirium confusion requiring restraints and Haldol  Upon explaining things to the patient he seemed to understand what I was saying  Coronary angiography showing multivessel disease with severe left ventricular dysfunction  Spoke with the son at length  6/24/2021  Patient was seen on the floor earlier this morning son at bedside  Overnight events noted that included delirium and agitation  Remains confused this morning  Lower extremity restraints on  Barrera catheter intact  Data reviewed in detail  He denied chest pain  6/25/21  Patient was seen on the floor earlier this afternoon  Scheduled for stenting of the coronary  Pain-free  Confused disoriented  In restraints  6/26/2021  78year-old man seen on the floor earlier this morning  Family members at bedside  Spoke with the nursing staff  On supplemental oxygen  Oriented to person only  Tolerating medications  Mental status somewhat better  6/27/2021  Patient was seen on the floor earlier this morning  Nursing staff at bedside  Sitter at bedside  Remains disoriented  Spoke with cardiology  No further chest pain or shortness of breath  Did not sleep well due to agitation  6/28/2021  Patient was seen on the floor earlier this morning  Somewhat sleepy  Sitter at bedside  Easily arousable  Denies any complaints  Disoriented  6/29/2021  Patient was seen on the floor earlier this morning  Spoke with the son  Spoke with the nursing staff  His mentation is somewhat better with less of agitation and confusion  Still disoriented  Overall somewhat better  6/30/2021  Patient was seen on the floor earlier this morning  Data reviewed in detail  Still confused disoriented and pulling lines out    Past Medical History:    Past Medical History:   Diagnosis Date    Arthritis     bilateral knee    BPH (benign prostatic hypertrophy) 8/4/2016    CAD (coronary artery disease)     Cervical spondylosis 8/4/2016    Constipation     prune juice in am    CTS (carpal tunnel syndrome) 8/4/2016    Diabetes mellitus ([facility] [address]     DM type 2 (diabetes mellitus, type 2) ([facility] [address] 8/4/2016    Exogenous obesity 8/4/2016    Glaucoma     bilateral    History of EKG 10/14/2015 per Dr Pillo Schaffer on chart.  Hypercholesterolemia     Hypertension     OA (osteoarthritis) 8/4/2016    Partial blindness     left eye    Polyp of colon 8/4/2016    PONV (postoperative nausea and vomiting)     Preoperative clearance 11/12/2015    per Dr Pillo Schaffer on chart; for right tjak Dr Rukhsana Arredondo Vitamin B12 deficiency 8/4/2016    Vitamin D deficiency 8/4/2016    Wears glasses        Past Surgical History:    Past Surgical History:   Procedure Laterality Date    CARDIAC CATHETERIZATION  06/22/2021    [place]    CATARACT REMOVAL WITH IMPLANT Bilateral     COLONOSCOPY  10/31/2013    DIAGNOSTIC CARDIAC CATH LAB PROCEDURE  1990 approximately    ? stent    EYE SURGERY Bilateral     cataract    JOINT REPLACEMENT Right 2015    SHOULDER ARTHROPLASTY Left     TOTAL KNEE ARTHROPLASTY Right 2015       Medications Prior to Admission:    Medications Prior to Admission: metFORMIN (GLUCOPHAGE-XR) 500 MG extended release tablet, Take 1 tablet by mouth 3 times daily (with meals)  [] cefdinir (OMNICEF) 300 MG capsule, Take 1 capsule by mouth 2 times daily for 10 days  pantoprazole (PROTONIX) 40 MG tablet, TAKE 1 TABLET BY MOUTH EVERY DAY BEFORE BREAKFAST  oxybutynin (DITROPAN XL) 15 MG extended release tablet, Take 15 mg by mouth daily  valsartan (DIOVAN) 80 MG tablet, Take 1 tablet by mouth daily  amLODIPine (NORVASC) 5 MG tablet, TAKE 1 TABLET BY MOUTH EVERY DAY AT NIGHT  metoprolol tartrate (LOPRESSOR) 50 MG tablet, TAKE 1 TABLET BY MOUTH EVERY DAY  atorvastatin (LIPITOR) 40 MG tablet, TAKE 1 TABLET BY MOUTH EVERY DAY  dapagliflozin (FARXIGA) 10 MG tablet, TAKE 1 TABLET BY MOUTH EVERY DAY IN THE MORNING  vitamin E 400 UNIT capsule, Take 1 capsule by mouth daily  mirabegron (MYRBETRIQ) 25 MG TB24, Take 1 tablet by mouth three times a week Mon-Wed-& Friday  tamsulosin (FLOMAX) 0.4 MG capsule, TAKE 1 CAPSULE BY MOUTH EVERYDAY AT BEDTIME  blood glucose test strips (FREESTYLE TEST STRIPS) strip, Indications: freestyle test strips DX: E11.9 As needed.     Diagnosis is E 11.9  FREESTYLE LANCETS MISC, 1 each by Does not apply route daily Indications: in AM DX: E11.9  Continuous Blood Gluc Sensor ([address] [place] [address]) MISC, 1 Units by Does not apply route 3 times daily  Continuous Blood Gluc  (FREESTYLE MAYELA READER) WALTER, 1 Units by Does not apply route 3 times daily e11.9  aspirin 81 MG EC tablet, Take 81 mg by mouth nightly  Coenzyme Q10 (CO Q-10) 100 MG CAPS, Take 100 mg by mouth nightly  Omega-3 Fatty Acids (FISH OIL) 300 MG CAPS, Take 1 capsule by mouth Daily with supper  vitamin B-12 (CYANOCOBALAMIN) 1000 MCG tablet, Take 1,000 x-ray  Anemia of chronic disease i.e. CKD  Loose stools reported with incontinence/discontinue Omnicef  Active Problems:    NSTEMI (non-ST elevated myocardial infarction) ([address]  Acute CHF likely diastolic based on ischemic event  Multivessel coronary artery disease noted on angiography with a LV dysfunction  Acute delirium  Patient Active Problem List   Diagnosis    Hypertension    Hypercholesterolemia    CAD (coronary artery disease)    Benign prostatic hyperplasia    CTS (carpal tunnel syndrome)    Cervical spondylosis    DM [facility]) [facility]    Exogenous obesity    OA (osteoarthritis)    Polyp of colon    Vitamin B12 deficiency    Vitamin D deficiency    Morbidly obese (HCC)    Asthmatic bronchitis, mild intermittent, uncomplicated    Chest pain    Generalized weakness    NSTEMI (non-ST elevated myocardial infarction) ([address]           PLAN:  Check chest x-ray   in view of recent MI transfusion is indicated  spoke with son and wife multiple times  Proceed with placement to subacute rehab  Medical management for coronary artery disease    Ryan Thomas MD  3:13 PM  6/30/2021

## 2021-07-01 NOTE — PROGRESS NOTES
only  Tolerating medications  Mental status somewhat better  6/27/2021  Patient was seen on the floor earlier this morning  Nursing staff at bedside  Sitter at bedside  Remains disoriented  Spoke with cardiology  No further chest pain or shortness of breath  Did not sleep well due to agitation  6/28/2021  Patient was seen on the floor earlier this morning  Somewhat sleepy  Sitter at bedside  Easily arousable  Denies any complaints  Disoriented  6/29/2021  Patient was seen on the floor earlier this morning  Spoke with the son  Spoke with the nursing staff  His mentation is somewhat better with less of agitation and confusion  Still disoriented  Overall somewhat better  6/30/2021  Patient was seen on the floor earlier this morning  Data reviewed in detail  Still confused disoriented and pulling lines out  7/1/21  Patient was seen on the floor earlier this morning  Registered nurse at bedside  On 2 L of oxygen nasal cannula  Remains disoriented  Son at bedside  Data reviewed in detail  Chest x-ray showing pneumonia    Past Medical History:    Past Medical History:   Diagnosis Date    Arthritis     bilateral knee    BPH (benign prostatic hypertrophy) 8/4/2016    CAD (coronary artery disease)     Cervical spondylosis 8/4/2016    Constipation     prune juice in am    CTS (carpal tunnel syndrome) 8/4/2016    [facility] [address]     DM type 2 (diabetes mellitus, type 2) ([facility] [address] 8/4/2016    Exogenous obesity 8/4/2016    Glaucoma     bilateral    History of EKG 10/14/2015 per Dr Kirill Deal on chart.     Hypercholesterolemia     Hypertension     OA (osteoarthritis) 8/4/2016    Partial blindness     left eye    Polyp of colon 8/4/2016    PONV (postoperative nausea and vomiting)     Preoperative clearance 11/12/2015    per Dr Kirill Deal on chart; for right tjak Dr Greer Filler Vitamin B12 deficiency 8/4/2016    Vitamin D deficiency 8/4/2016    Wears glasses        Past Surgical History:    Past Surgical History: Procedure Laterality Date    CARDIAC CATHETERIZATION  2021    [place]    CATARACT REMOVAL WITH IMPLANT Bilateral     COLONOSCOPY  10/31/2013    DIAGNOSTIC CARDIAC CATH LAB PROCEDURE   approximately    ? stent    EYE SURGERY Bilateral     cataract    JOINT REPLACEMENT Right 2015    SHOULDER ARTHROPLASTY Left     TOTAL KNEE ARTHROPLASTY Right 2015       Medications Prior to Admission:    Medications Prior to Admission: metFORMIN (GLUCOPHAGE-XR) 500 MG extended release tablet, Take 1 tablet by mouth 3 times daily (with meals)  [] cefdinir (OMNICEF) 300 MG capsule, Take 1 capsule by mouth 2 times daily for 10 days  pantoprazole (PROTONIX) 40 MG tablet, TAKE 1 TABLET BY MOUTH EVERY DAY BEFORE BREAKFAST  oxybutynin (DITROPAN XL) 15 MG extended release tablet, Take 15 mg by mouth daily  valsartan (DIOVAN) 80 MG tablet, Take 1 tablet by mouth daily  amLODIPine (NORVASC) 5 MG tablet, TAKE 1 TABLET BY MOUTH EVERY DAY AT NIGHT  metoprolol tartrate (LOPRESSOR) 50 MG tablet, TAKE 1 TABLET BY MOUTH EVERY DAY  atorvastatin (LIPITOR) 40 MG tablet, TAKE 1 TABLET BY MOUTH EVERY DAY  dapagliflozin (FARXIGA) 10 MG tablet, TAKE 1 TABLET BY MOUTH EVERY DAY IN THE MORNING  vitamin E 400 UNIT capsule, Take 1 capsule by mouth daily  mirabegron (MYRBETRIQ) 25 MG TB24, Take 1 tablet by mouth three times a week Mon-Wed-& Friday  tamsulosin (FLOMAX) 0.4 MG capsule, TAKE 1 CAPSULE BY MOUTH EVERYDAY AT BEDTIME  blood glucose test strips (FREESTYLE TEST STRIPS) strip, Indications: freestyle test strips DX: E11.9 As needed.     Diagnosis is E 11.9  FREESTYLE LANCETS MISC, 1 each by Does not apply route daily Indications: in AM DX: E11.9  Continuous Blood Gluc Sensor ([address] [place] [address]) MISC, 1 Units by Does not apply route 3 times daily  Continuous Blood Gluc  (FREESTYLE MAYELA READER) WALTER, 1 Units by Does not apply route 3 times daily e11.9  aspirin 81 MG EC tablet, Take 81 mg by mouth nightly  Coenzyme Q10 (CO Q-10) 100 MG CAPS, Take 100 mg by mouth nightly  Omega-3 Fatty Acids (FISH OIL) 300 MG CAPS, Take 1 capsule by mouth Daily with supper  vitamin B-12 (CYANOCOBALAMIN) 1000 MCG tablet, Take 1,000 mcg by mouth daily  Ascorbic Acid (VITAMIN C) 500 MG tablet, Take 500 mg by mouth daily  latanoprost (XALATAN) 0.005 % ophthalmic solution, Place 1 drop into the right eye nightly   vitamin D (ERGOCALCIFEROL) 1.25 MG (90943 UT) CAPS capsule, Take 1 capsule by mouth once a week for 12 doses (Patient taking differently: Take 50,000 Units by mouth once a week - Takes on Wednesday)    Allergies:    Ace inhibitors; Anesthetics, amide; and Vicodin [hydrocodone-acetaminophen]    Social History:    reports that he quit smoking about 40 years ago. His smoking use included cigars. He started smoking about 67 years ago. He has a 120.00 pack-year smoking history. He quit smokeless tobacco use about 5 years ago. He reports that he does not drink alcohol and does not use drugs. Family History:   family history includes Heart Disease in his mother; Heart Disease (age of onset: 52) in his father. REVIEW OF SYSTEMS:  As above in the HPI, otherwise negative    PHYSICAL EXAM:    Vitals:  BP (!) 104/55   Pulse 70   Temp 97.2 °F (36.2 °C) (Oral)   Resp 18   Ht 5' 9\" (1.753 m)   Wt 236 lb (107 kg)   SpO2 97%   BMI 34.85 kg/m²     General: Lethargic but easily arousable  Disoriented  . Well developed, well nourished, well groomed. Morbidly obese  HEENT:  Normocephalic, atraumatic. Pupils equal, round, reactive to light. No scleral icterus. No conjunctival injection. .  No nasal discharge. Neck:  Supple  Heart:  RRR, no murmurs, gallops, rubs  Lungs: Scattered rails in the posterior lung fields noted although chest x-ray reveals no pulmonary edema  Abdomen:   Bowel sounds present, soft, nontender, no masses, no organomegaly, no peritoneal signs  Extremities:  No clubbing, cyanosis, or edema  Skin:  Warm and dry, no open lesions or rash  Neuro:  Cranial nerves 2-12 intact, no focal deficits  Breast: deferred  Rectal: deferred  Genitalia:  deferred    LABS: Data reviewed in detail      ASSESSMENT:      Anemia of chronic disease i.e. CKD  Transfuse iron  Received 1 unit of packed cells earlier  Pulmonary consult to rule out pneumonia  Active Problems:    NSTEMI (non-ST elevated myocardial infarction) ([facility] [address]  Acute CHF likely diastolic based on ischemic event  Multivessel coronary artery disease noted on angiography with a LV dysfunction  Acute delirium  Patient Active Problem List   Diagnosis    Hypertension    Hypercholesterolemia    CAD (coronary artery disease)    Benign prostatic hyperplasia    CTS (carpal tunnel syndrome)    Cervical spondylosis    DM type 2 (diabetes mellitus, type 2) (HCC)    Exogenous obesity    OA (osteoarthritis)    Polyp of colon    Vitamin B12 deficiency    Vitamin D deficiency    Morbidly obese (HCC)    Asthmatic bronchitis, mild intermittent, uncomplicated    Chest pain    Generalized weakness    NSTEMI (non-ST elevated myocardial infarction) ([facility] [address]           PLAN:  Consult pulmonary  Check procalcitonin and CRP  in view of recent MI transfusion is indicated  spoke with son and wife multiple times  Proceed with placement to subacute rehab  Medical management for coronary artery disease    Pierre Garcia MD  4:18 PM  7/1/2021

## 2021-07-01 NOTE — PLAN OF CARE
Problem: Falls - Risk of:  Goal: Will remain free from falls  6/30/2021 2228 by Mike Schaumann, RN  Outcome: Met This Shift  6/30/2021 1829 by Zonia Zapata RN  Outcome: Met This Shift  Goal: Absence of physical injury  Outcome: Met This Shift     Problem: Pain:  Goal: Pain level will decrease  Outcome: Met This Shift  Goal: Control of acute pain  Outcome: Met This Shift  Goal: Control of chronic pain  Outcome: Met This Shift

## 2021-07-01 NOTE — PLAN OF CARE
Problem: Falls - Risk of:  Goal: Will remain free from falls  Description: Will remain free from falls  Outcome: Met This Shift     Problem: Pain:  Description: Pain management should include both nonpharmacologic and pharmacologic interventions.   Goal: Pain level will decrease  Description: Pain level will decrease  Outcome: Met This Shift

## 2021-07-02 NOTE — PROGRESS NOTES
Bj Ramirez MD FACP  Progress notes      CHIEF COMPLAINT: Chest pain      HISTORY OF PRESENT ILLNESS:   1021/21   78year-old  woman in the emergency room at [facility] earlier this morning  The son and a registered nurse were at bedside  Data reviewed in detail  Spoke with the ER physician in person  Patient with known history of coronary artery disease had an angioplasty many years ago for the LAD  Recently hospitalized with acute cystitis  Refused to see cardiologist on that admission  Now presents with onset of anterior chest discomfort described as a heaviness that woke him up at 5 AM  Nitroglycerin sublingual apparently relieved the pain in the ER  Troponins BNP were markedly elevated along with EKG changes suggestive of acute non-ST elevation myocardial infarction  He also reports shortness of breath even at rest  6/22/2021  Patient was seen on the floor earlier this morning  No further angina  On IV heparin infusion  Tolerating medications well  Data reviewed in detail  6/23/21  Patient was seen on the floor earlier this morning  Son was at bedside  Overnight delirium confusion requiring restraints and Haldol  Upon explaining things to the patient he seemed to understand what I was saying  Coronary angiography showing multivessel disease with severe left ventricular dysfunction  Spoke with the son at length  6/24/2021  Patient was seen on the floor earlier this morning son at bedside  Overnight events noted that included delirium and agitation  Remains confused this morning  Lower extremity restraints on  Barrera catheter intact  Data reviewed in detail  He denied chest pain  6/25/21  Patient was seen on the floor earlier this afternoon  Scheduled for stenting of the coronary  Pain-free  Confused disoriented  In restraints  6/26/2021  78year-old man seen on the floor earlier this morning  Family members at bedside  Spoke with the nursing staff  On supplemental oxygen  Oriented to person only  Tolerating medications  Mental status somewhat better  6/27/2021  Patient was seen on the floor earlier this morning  Nursing staff at bedside  Sitter at bedside  Remains disoriented  Spoke with cardiology  No further chest pain or shortness of breath  Did not sleep well due to agitation  6/28/2021  Patient was seen on the floor earlier this morning  Somewhat sleepy  Sitter at bedside  Easily arousable  Denies any complaints  Disoriented  6/29/2021  Patient was seen on the floor earlier this morning  Spoke with the son  Spoke with the nursing staff  His mentation is somewhat better with less of agitation and confusion  Still disoriented  Overall somewhat better  6/30/2021  Patient was seen on the floor earlier this morning  Data reviewed in detail  Still confused disoriented and pulling lines out  7/1/21  Patient was seen on the floor earlier this morning  Registered nurse at bedside  On 2 L of oxygen nasal cannula  Remains disoriented  Son at bedside  Data reviewed in detail  Chest x-ray showing pneumonia  7/2/2021  Patient was seen on the floor earlier this morning  Remains somewhat confused but much more alert  Still has a cough  On supplemental oxygen  Data reviewed in detail    Past Medical History:    Past Medical History:   Diagnosis Date    Arthritis     bilateral knee    BPH (benign prostatic hypertrophy) 8/4/2016    CAD (coronary artery disease)     Cervical spondylosis 8/4/2016    Constipation     prune juice in am    CTS (carpal tunnel syndrome) 8/4/2016    Diabetes mellitus ([facility]     DM type 2 (diabetes mellitus, type 2) ([facility] 8/4/2016    Exogenous obesity 8/4/2016    Glaucoma     bilateral    History of EKG 10/14/2015 per Dr Sarah Bustamante on chart.     Hypercholesterolemia     Hypertension     OA (osteoarthritis) 8/4/2016    Partial blindness     left eye    Polyp of colon 8/4/2016    PONV (postoperative nausea and vomiting)     Preoperative clearance 11/12/2015    per Dr Sarah Bustamante on chart; for right tjak Dr Antonina Love Vitamin B12 deficiency 2016    Vitamin D deficiency 2016    Wears glasses        Past Surgical History:    Past Surgical History:   Procedure Laterality Date    CARDIAC CATHETERIZATION  2021    [place]    CATARACT REMOVAL WITH IMPLANT Bilateral     COLONOSCOPY  10/31/2013    DIAGNOSTIC CARDIAC CATH LAB PROCEDURE   approximately    ? stent    EYE SURGERY Bilateral     cataract    JOINT REPLACEMENT Right 2015    SHOULDER ARTHROPLASTY Left 2000    TOTAL KNEE ARTHROPLASTY Right 2015       Medications Prior to Admission:    Medications Prior to Admission: metFORMIN (GLUCOPHAGE-XR) 500 MG extended release tablet, Take 1 tablet by mouth 3 times daily (with meals)  [] cefdinir (OMNICEF) 300 MG capsule, Take 1 capsule by mouth 2 times daily for 10 days  pantoprazole (PROTONIX) 40 MG tablet, TAKE 1 TABLET BY MOUTH EVERY DAY BEFORE BREAKFAST  oxybutynin (DITROPAN XL) 15 MG extended release tablet, Take 15 mg by mouth daily  valsartan (DIOVAN) 80 MG tablet, Take 1 tablet by mouth daily  amLODIPine (NORVASC) 5 MG tablet, TAKE 1 TABLET BY MOUTH EVERY DAY AT NIGHT  metoprolol tartrate (LOPRESSOR) 50 MG tablet, TAKE 1 TABLET BY MOUTH EVERY DAY  atorvastatin (LIPITOR) 40 MG tablet, TAKE 1 TABLET BY MOUTH EVERY DAY  dapagliflozin (FARXIGA) 10 MG tablet, TAKE 1 TABLET BY MOUTH EVERY DAY IN THE MORNING  vitamin E 400 UNIT capsule, Take 1 capsule by mouth daily  mirabegron (MYRBETRIQ) 25 MG TB24, Take 1 tablet by mouth three times a week Mon-Wed-& Friday  tamsulosin (FLOMAX) 0.4 MG capsule, TAKE 1 CAPSULE BY MOUTH EVERYDAY AT BEDTIME  blood glucose test strips (FREESTYLE TEST STRIPS) strip, Indications: freestyle test strips DX: E11.9 As needed.     Diagnosis is E 11.9  FREESTYLE LANCETS MISC, 1 each by Does not apply route daily Indications: in AM DX: E11.9  Continuous Blood Gluc Sensor ([address] [place] [address]) MISC, 1 Units by Does not apply route 3 pulmonary edema  Abdomen:   Bowel sounds present, soft, nontender, no masses, no organomegaly, no peritoneal signs  Extremities:  No clubbing, cyanosis, or edema  Skin:  Warm and dry, no open lesions or rash  Neuro:  Cranial nerves 2-12 intact, no focal deficits  Breast: deferred  Rectal: deferred  Genitalia:  deferred    LABS: Data reviewed in detail      ASSESSMENT:      Anemia of chronic disease i.e. CKD  Transfuse iron  Received 1 unit of packed cells earlier  Pulmonary consult to rule out pneumonia  Active Problems:    NSTEMI (non-ST elevated myocardial infarction) ([facility]  Acute CHF likely diastolic based on ischemic event  Multivessel coronary artery disease noted on angiography with a LV dysfunction  Acute delirium  Patient Active Problem List   Diagnosis    Hypertension    Hypercholesterolemia    CAD (coronary artery disease)    Benign prostatic hyperplasia    CTS (carpal tunnel syndrome)    Cervical spondylosis    DM type 2 [facility]diabetes mellitus, type 2) ([facility])    Exogenous obesity    OA (osteoarthritis)    Polyp of colon    Vitamin B12 deficiency    Vitamin D deficiency    Morbidly obese (HCC)    Asthmatic bronchitis, mild intermittent, uncomplicated    Chest pain    Generalized weakness    NSTEMI (non-ST elevated myocardial infarction) ([facility])           PLAN:  Consult pulmonary/input appreciated  Check procalcitonin and CRP  in view of recent MI transfusion is indicated  spoke with son and wife multiple times  Proceed with placement to subacute rehab  Medical management for coronary artery disease    Devon Keller MD  1:50 PM  7/2/2021

## 2021-07-02 NOTE — PLAN OF CARE
Problem: Tissue Perfusion - Cardiopulmonary, Altered:  Goal: Hemodynamic stability will improve  Description: Hemodynamic stability will improve  Outcome: Met This Shift

## 2021-07-02 NOTE — PROGRESS NOTES
Occupational Therapy  OCCUPATIONAL THERAPY treatment note  [address] [address]  [address] [place] [address]      Date:2021                                                Patient Name: Kd Richards  MRN: [id number]  : 1940  Room: [address] [place] [address]    Per eval:  Evaluating OT: Jayla Trejo OTR/L #[id number]     Referring Provider: Osmar Dunhma MD  Specific Provider Orders/Date: OT eval and treat 21    Diagnosis: NSTEMI   Pt admitted to hospital d/t chest pain    Surgery / Procedure: 21:  Right radial heart cath placed    Pertinent Medical History: Arthritis, BPH, CAD, CTS, Glaucoma, OA, partial blindness (left eye), HTN    Precautions:  Fall Risk, O2, TAPS, Bed Alarm, TSM, Cognition    Assessment of current deficits    [x] Functional mobility  [x]ADLs  [x] Strength               [x]Cognition    [x] Functional transfers   [x] IADLs         [x] Safety Awareness   [x]Endurance    [] Fine Coordination              [x] Balance      [] Vision/perception   []Sensation     []Gross Motor Coordination  [] ROM  [] Delirium                   [] Motor Control     OT PLAN OF CARE   OT POC based on physician orders, patient diagnosis and results of clinical assessment    Frequency/Duration: 1-3 days/wk for 2 weeks PRN   Specific OT Treatment Interventions to include:   * Instruction/training on adapted ADL techniques and AE recommendations to increase functional independence within precautions       * Training on energy conservation strategies, correct breathing pattern and techniques to improve independence/tolerance for self-care routine  * Functional transfer/mobility training/DME recommendations for increased independence, safety, and fall prevention  * Patient/Family education to increase follow through with safety techniques and functional independence  * Recommendation of environmental modifications for increased safety with functional transfers/mobility and ADLs  * Cognitive retraining/development of therapeutic activities to improve problem solving, judgement, memory, and attention for increased safety/participation in ADL/IADL tasks  * Therapeutic activities to facilitate/challenge dynamic balance, stand tolerance for increased safety and independence with ADLs  * Therapeutic activities to facilitate gross/fine motor skills for increased independence with ADLs  * Positioning to improve skin integrity, interaction with environment and functional independence    Recommended Adaptive Equipment: TBD     Pt is poor historian    Home Living: Pt is poor historian, Pt wife stated: Pt lives with his wife in a 1 story home with 2 CLAIR (1HR) to enter   Bathroom setup: walk-in shower    Equipment owned: sc, w/w, cane, BSC    Prior Level of Function: Independent with ADLs , Independent with IADLs; ambulated with cane   Driving: no   Occupation: retired vieira    Pain Level: Pt denies pain this session    Cognition: A&O: 1/4 (self only); Follows 1 step directions   Memory:  Poor   Sequencing:  Poor+   Problem solving:  Poor   Judgement/safety:  Poor     Functional Assessment:  AM-PAC Daily Activity Raw Score: 14/24   Initial Eval Status  Date: 6/23/21 Treatment Status  Date:7/2/21 STGs = LTGs  Time frame: 10-14 days   Feeding Stand by Assist  Set-up Modified [place]   Grooming Stand by Assist  Min A    Seated at EOB; cuing on sequencing  Modified [place]    UB Dressing Minimal Assist  Min A     [place] / donning gown; cuing on sequencing of task. Modified [place]    LB Dressing Maximal Assist  Max A    Socks; cuing on technique and sequencing Minimal Assist    Bathing Dependent  D/t cognition Max A  Mod Assist    Toileting Dependent   D/t cognition Dependent    Incontinent of urine - dependent for hygiene   Moderate Assist    Bed Mobility  Supine to sit: Moderate Assist   Sit to supine:  Moderate Assist  Mod A    Completed log roll technique  Supine to sit: Stand by Assist   Sit to supine: Stand by Assist    Functional Transfers Moderate Assist x2   Sit<>stand with HHA Mod A    Various surfaces; cuing on hand placement, body mechanics and safety Minimal Assist    Functional Mobility Moderate Assist   Functional side steps to [facility] with HHA Max A     Ambulated short distance with w/w to/from chair; cuing on sequencing,posture, w/w management and safety. Stand by Assist    Balance Sitting:     Static:  SBA    Dynamic:SBA  Standing: Mod A Sitting: SBA  Standing: Mod A at w/w Sitting:     Static:  Mod I    Dynamic:Mod I  Standing: SBA   Activity Tolerance P+ Fair-    Limited due to SOB and fatigue Fair   Visual/  Perceptual Glasses: yes, wfl              .    Comments: Upon arrival pt supine in bed and agreeable to OT session - son present during session. At end of session semi-supine in bed (bed alarm on) with all lines intact, call light within reach. Treatment: Facilitation of bed mobility, unsupported sitting balance (impacting ADLs; addressing posture, weight shifting, dynamic reaching), functional transfers (various surfaces: bed, chair; sit to stands / stand pivots with w/w), standing tolerance tasks (addressing posture, balance and activity tolerance; impacting ADLs and functional activity) and several trials at short steps with w/w (in preparation for ambulation to/ from bathroom ; cuing on posture, w/w management and safety)- skilled cuing on sequencing, hand placement, posture, body mechanics, energy conservation techniques and safety. Therapist facilitated self-care retraining: UB/LB self-care tasks (gown, bathing task, socks), toileting hygiene task, seated grooming tasks while seated at EOB and self-feeding task while educating pt on sequencing, modified techniques, posture, safety and energy conservation techniques. Skilled monitoring of HR, O2 sats, BP and pts response to treatment. · Pt has made fair+ progress towards set goals.    · Continue with current plan of care: addressing adl retraining, transfer training and functional ambulation.      Time In: 940  Time Out: 1019  Total Treatment Time: 39 minutes    Min Units   OT Eval Low 81227       OT Eval Medium 32055     OT Eval High 94824     OT Re-Eval [id number]     Therapeutic Ex 90636     Therapeutic Activities 00137 16 1   ADL/Self Care 74771 23 2   Orthotic Management 99842       Manual 50601     Neuro Re-Ed 69972       Non-Billable Time          Jaquan Rollins OTR/L #[id number]

## 2021-07-02 NOTE — PROGRESS NOTES
Spoke with radiology regarding CXR results pending discharge. Called [phone] regarding CXR to be read for pending discharge.    Natanael Dickson RN

## 2021-07-02 NOTE — PROGRESS NOTES
Physical Therapy    Name: Debora Olivarez  : 1940  MRN: [id number]      Date of Service: 2021    Evaluating PT:  Margit Claude, PT [id number]      Room #:  9203/3900-R  Diagnosis:  NSTEMI (non-ST elevated myocardial infarction) [facility]) [I21.4]  PMHx/PSHx:     has a past surgical history that includes Diagnostic Cardiac Cath Lab Procedure ( approximately); eye surgery (Bilateral); Total shoulder arthroplasty (Left, ); Cataract removal with implant (Bilateral); Total knee arthroplasty (Right, 2015); Colonoscopy (10/31/2013); joint replacement (Right, 2015); and Cardiac catheterization (2021). has a past surgical history that includes Diagnostic Cardiac Cath Lab Procedure ( approximately); eye surgery (Bilateral); Total shoulder arthroplasty (Left, ); Cataract removal with implant (Bilateral); Total knee arthroplasty (Right, 2015); Colonoscopy (10/31/2013); joint replacement (Right, 2015); and Cardiac catheterization (2021). Procedure/Surgery:  Cardiac catheterization-2021  Precautions:  Falls, O2 , FWB (full weight bearing),   Equipment Needs: To be determined. SUBJECTIVE:    Patient lives with spouse  in a ranch home  with 2 steps to enter without 1Rail  Bed is on 1 floor and bath is on 1 floor. Patient ambulated with cane  PTA. Equipment owned: Tricia Brenda,    Wife provided information regarding home setup. OBJECTIVE:   Initial Evaluation  Date: 21 Treatment  21 Short Term/ Long Term   Goals   AM-PAC 6 Clicks 53/45     Was pt agreeable to Eval/treatment? yes yes    Does pt have pain? None stated. Bed Mobility  Rolling: Min  Supine to sit: Mod  Sit to supine: Mod  Scooting: Mod Rolling: Min  Supine to sit: Mod  Sit to supine: Mod  Scooting: Mod Rolling: Ind  Supine to sit: Ind  Sit to supine: Ind  Scooting: Ind   Transfers Sit to stand: Mod   Stand to sit: Mod  Stand pivot: NT  Sit to stand:  Mod   Stand to sit: Mod  Stand pivot: Mod with fww to chair and max return to bed. Sit to stand: Mod Ind  Stand to sit: Mod Ind  Stand pivot: Mod Ind   Ambulation    side steps to [facility]. Flexed posture. Side steps only for transfer. Increased cues required for posture and to control fww. Mod/Max 100 feet with device if needed if possible. Stair negotiation: ascended and descended  NT NT 2 steps with 1 rail Min   ROM BUE:  Defer to see OT eval  BLE:  wfl     Strength BUE: defer to  OT eval  RLE:  4/5  LLE:   4/5  4+/5   Balance Sitting EOB:  Min for safety  Dynamic Standing: Mod  Sitting EOB:  Ind  Dynamic Standing: Mod Ind     Patient is Alert & Oriented x person required constant cues to stay on task and follows directions   Sensation:  Pt denies numbness and tingling to extremities  Edema:  BLE. Therapeutic Exercises:  Functional activity as stated above also see comments. Patient education  Pt educated on role of PT     Patient response to education:   Pt verbalized understanding Pt demonstrated skill Pt requires further education in this area   no no Reminders     ASSESSMENT:    Conditions Requiring Skilled Therapeutic Intervention:    [x]Decreased strength     [x]Decreased ROM  [x]Decreased functional mobility  [x]Decreased balance   [x]Decreased endurance   [x]Decreased posture  []Decreased sensation  []Decreased coordination   []Decreased vision  [x]Decreased safety awareness   []Increased pain       Comments:    RN cleared patient for participation in therapy session. Patient was seen this date for PT treatment. Patient was agreeable to intervention. Results of the functional assessment are noted above. Upon entering the room patient was found supine in bed using bed pan. Required assist to remove bed pan. .  Assisted to sit EOB x 15 minutes to increase dynamic sitting balance and activity tolerance. Sit to stand completed to fww with cues for sequencing multiple times.   Required Mod A for transfer to chair and Max a from mechanics, endurance and asses need for appropriate assistive device  [x] Stair Training in preparation for safe discharge home and/or into the community   [] Positioning to prevent skin breakdown and contractures  [x] Safety and Education Training   [] Patient/Caregiver Education   [] HEP  [] Other     PT long term treatment goals are located in above grid    Frequency of treatments: 2-5x/week x 1-2 weeks. Time in  0935  Time out  1015    Total Treatment Time  40 minutes     Evaluation Time includes thorough review of current medical information, gathering information on past medical history/social history and prior level of function, completion of standardized testing/informal observation of tasks, assessment of data and education on plan of care and goals.     CPT codes:  [] Low Complexity PT evaluation 09136  [] Moderate Complexity PT evaluation 55242  [] High Complexity PT evaluation 31391  [] PT Re-evaluation 72341  [] Gait training 12062 - minutes  [] Manual therapy 62254 - minutes  [x] Therapeutic activities 40922 40 minutes  [] Therapeutic exercises 97491 - minutes  [] Neuromuscular reeducation 42206 - minutes     Ely Boudreaux, [address] [facility]

## 2021-07-02 NOTE — PROGRESS NOTES
[facility] [address]  [address], [address]  [facility], [facility]      Pulmonary Progress Note      SUBJECTIVE:  Pt being seen for Dr Corie Lorenzo   Awake, conversant though looking weak, cough sounding ronchorous but claims not much sputum production, on 3 li NC, (?) similar/slighty better with breathing.     OBJECTIVE    Medications    Continuous Infusions:   sodium chloride      sodium chloride      dextrose         Scheduled Meds:   piperacillin-tazobactam  3,375 mg Intravenous Q8H    QUEtiapine  25 mg Oral Nightly    furosemide  20 mg Oral Daily    isosorbide mononitrate  60 mg Oral Daily    clopidogrel  75 mg Oral Daily    ipratropium-albuterol  1 ampule Inhalation Q6H    sodium chloride flush  5-40 mL Intravenous 2 times per day    carvedilol  6.25 mg Oral BID WC    aspirin  81 mg Oral Daily    atorvastatin  40 mg Oral Daily    latanoprost  1 drop Right Eye Nightly    pantoprazole  40 mg Oral QAM AC    tamsulosin  0.4 mg Oral Daily    vitamin D  50,000 Units Oral Weekly    insulin lispro  0-12 Units Subcutaneous TID WC    insulin lispro  0-6 Units Subcutaneous Nightly       PRN Meds:guaiFENesin-dextromethorphan, sodium chloride, sodium chloride flush, sodium chloride, glucose, dextrose, glucagon (rDNA), dextrose, acetaminophen    Physical    VITALS:  /66   Pulse 75   Temp 97.7 °F (36.5 °C) (Oral)   Resp 22   Ht 5' 9\" (1.753 m)   Wt 236 lb (107 kg)   SpO2 97%   BMI 34.85 kg/m²     24HR INTAKE/OUTPUT:      Intake/Output Summary (Last 24 hours) at 2021 1259  Last data filed at 2021 1041  Gross per 24 hour   Intake 825 ml   Output --   Net 825 ml       24HR PULSE OXIMETRY RANGE:    SpO2  Av.8 %  Min: 92 %  Max: 97 %    General appearance: alert, appears stated age and cooperative  Lungs: rhonchi bibasilar  Heart: regular rate and rhythm, S1, S2 normal, no murmur, click, rub or gallop  Abdomen: soft, non-tender; bowel sounds normal; no masses,  no organomegaly  Extremities: extremities normal, atraumatic, no cyanosis or edema  Neurologic: Mental status: Alert, oriented, thought content appropriate    Data    CBC:   Recent Labs     06/30/21  1214 07/01/21  0636   WBC 6.2 6.0   HGB 7.7* 8.2*   HCT 23.7* 24.1*   .3* 109.0*    190       BMP:  Recent Labs     06/30/21  0518 07/01/21  0636 07/02/21  0532   * 143 140   K 4.2 4.0 4.0   CL 99 103 102   CO2 26 27 28   BUN 35* 30* 28*   CREATININE 1.2 1.2 1.3*    ALB:3,BILIDIR:3,BILITOT:3,ALKPHOS:3)@    PT/INR: No results for input(s): PROTIME, INR in the last 72 hours. ABG:   No results for input(s): PH, PO2, PCO2, HCO3, BE, O2SAT, METHB, O2HB, COHB, O2CON, HHB, THB in the last 72 hours. FiO2 : 40 %       Radiology/Other tests reviewed: none    Assessment:     Active Problems:    NSTEMI (non-ST elevated myocardial infarction) ([facility] [address]  Resolved Problems:    * No resolved hospital problems. *      Plan:       1. procalcitonin very minimally elevated, normal WBC, no noted fever. Repeat CXR and BNP, may be able to downgrade antibiotics or stop if remains ok  2. Watch fluid balance, diurese as needed  3. Cont with oxygen, taper as tolerated  4. Cont with nebs, observe respiratory function and cough  5. Flutter device and see if helps with expectoration  6. OOB to chair, ambulate with assistance      Time at the bedside, reviewing labs and radiographs, reviewing notes and consultations, discussing with staff and family was more than 35 minutes. Thanks for letting us see this patient in consultation. Please contact us with any questions. Office [phone] or after hours through [employer], [phone].

## 2021-07-02 NOTE — PROGRESS NOTES
Spoke to lab regarding BNP. Mindi Townsend states it has been collected but they have not received the blood work yet.    Cam Rollins RN

## 2021-07-02 NOTE — CONSULTS
Pulmonary [facility]                             Pulmonary Consult/Progress Note :          Patient: Alyssa Officer  MRN: [id number]  : 1940      Date of Admission: .2021  5:16 AM    Consulting Physician:Dr Ponce         Reason for Consultation:Pneumonia   CC : SOB   HPI:   Alyssa Officer is a 78 y.o. with 20-30 PPY smoking history ,poor historian ,with known history of coronary artery disease had an angioplasty many years ago for the LAD  Recently hospitalized with acute cystitis,also had NSTEMI and seen by CTS and cardiology and had PCI       He had multiple vessels disease and seen by CTS and was claimed not surgical candidate     Pulmonary was consulted for Hypoxia ,Possible pneumonia      He is on fio2 40 %      CXR pulmonary pneumonia /Pulmonary edema       PAST MEDICAL HISTORY:   Past Medical History:   Diagnosis Date    Arthritis     bilateral knee    BPH (benign prostatic hypertrophy) 2016    CAD (coronary artery disease)     Cervical spondylosis 2016    Constipation     prune juice in am    CTS (carpal tunnel syndrome) 2016    [facility]     DM type 2 (diabetes mellitus, type 2) ([facility] [address] [facility] 2016    Exogenous obesity 2016    Glaucoma     bilateral    History of EKG 10/14/2015 per Dr Dougie Miles on chart.     Hypercholesterolemia     Hypertension     OA (osteoarthritis) 2016    Partial blindness     left eye    Polyp of colon 2016    PONV (postoperative nausea and vomiting)     Preoperative clearance 2015    per Dr Dougie Miles on chart; for right tjak Dr Antonina Love Vitamin B12 deficiency 2016    Vitamin D deficiency 2016    Wears glasses        PAST SURGICAL HISTORY:   Past Surgical History:   Procedure Laterality Date    CARDIAC CATHETERIZATION  2021    [facility]    CATARACT REMOVAL WITH IMPLANT Bilateral     COLONOSCOPY  10/31/2013    DIAGNOSTIC CARDIAC CATH LAB PROCEDURE   Status:    Intimate Partner Violence:     Fear of Current or Ex-Partner:     Emotionally Abused:     Physically Abused:     Sexually Abused:      Social History     Tobacco Use   Smoking Status Former Smoker    Packs/day: 5.00    Years: 24.00    Pack years: 120.00    Types: Cigars    Start date:     Quit date: 1981    Years since quittin.5   Smokeless Tobacco Former User    Quit date: 2015   Tobacco Comment    smoked 5 cigars daily in past , no cigaretts/      Social History     Substance and Sexual Activity   Alcohol Use No     Social History     Substance and Sexual Activity   Drug Use No       OCCUPATIONAL HISTORY:            HOME MEDICATIONS:  Prior to Admission medications    Medication Sig Start Date End Date Taking?  Authorizing Provider   metFORMIN (GLUCOPHAGE-XR) 500 MG extended release tablet Take 1 tablet by mouth 3 times daily (with meals) 21  Yes Lovely Welch MD   pantoprazole (PROTONIX) 40 MG tablet TAKE 1 TABLET BY MOUTH EVERY DAY BEFORE BREAKFAST 6/15/21  Yes Lovely Welch MD   oxybutynin (DITROPAN XL) 15 MG extended release tablet Take 15 mg by mouth daily   Yes Historical Provider, MD   valsartan (DIOVAN) 80 MG tablet Take 1 tablet by mouth daily 3/15/21  Yes Lovely Welch MD   amLODIPine (NORVASC) 5 MG tablet TAKE 1 TABLET BY MOUTH EVERY DAY AT NIGHT 3/8/21  Yes Lovely Welch MD   metoprolol tartrate (LOPRESSOR) 50 MG tablet TAKE 1 TABLET BY MOUTH EVERY DAY 3/8/21  Yes Lovely Welch MD   atorvastatin (LIPITOR) 40 MG tablet TAKE 1 TABLET BY MOUTH EVERY DAY 3/8/21  Yes Lovely Welch MD   dapagliflozin (FARXIGA) 10 MG tablet TAKE 1 TABLET BY MOUTH EVERY DAY IN THE MORNING 20  Yes Lovely Welch MD   vitamin E 400 UNIT capsule Take 1 capsule by mouth daily 20  Yes Lovely Welch MD   mirabegron (MYRBETRIQ) 25 MG TB24 Take 1 tablet by mouth three times a week Mon-Wed-& 20  Yes Lovely Welch MD   tamsulosin (FLOMAX) 0.4 MG capsule TAKE 1 tablet 75 mg, 75 mg, Oral, Daily  ipratropium-albuterol (DUONEB) nebulizer solution 1 ampule, 1 ampule, Inhalation, Q6H  sodium chloride flush 0.9 % injection 5-40 mL, 5-40 mL, Intravenous, 2 times per day  sodium chloride flush 0.9 % injection 5-40 mL, 5-40 mL, Intravenous, PRN  0.9 % sodium chloride infusion, 25 mL, Intravenous, PRN  carvedilol (COREG) tablet 6.25 mg, 6.25 mg, Oral, BID WC  aspirin EC tablet 81 mg, 81 mg, Oral, Daily  atorvastatin (LIPITOR) tablet 40 mg, 40 mg, Oral, Daily  latanoprost (XALATAN) 0.005 % ophthalmic solution 1 drop, 1 drop, Right Eye, Nightly  pantoprazole (PROTONIX) tablet 40 mg, 40 mg, Oral, QAM AC  tamsulosin (FLOMAX) capsule 0.4 mg, 0.4 mg, Oral, Daily  vitamin D (ERGOCALCIFEROL) capsule 50,000 Units, 50,000 Units, Oral, Weekly  insulin lispro (HUMALOG) injection vial 0-12 Units, 0-12 Units, Subcutaneous, TID WC  insulin lispro (HUMALOG) injection vial 0-6 Units, 0-6 Units, Subcutaneous, Nightly  glucose (GLUTOSE) 40 % oral gel 15 g, 15 g, Oral, PRN  dextrose 50 % IV solution, 12.5 g, Intravenous, PRN  glucagon (rDNA) injection 1 mg, 1 mg, Intramuscular, PRN  dextrose 5 % solution, 100 mL/hr, Intravenous, PRN  acetaminophen (TYLENOL) tablet 500 mg, 500 mg, Oral, Q4H PRN    IV MEDICATIONS:   sodium chloride      sodium chloride      dextrose         ALLERGIES:  Allergies   Allergen Reactions    Ace Inhibitors      LIZZ    Anesthetics, Amide      Hospitalization after use.     Vicodin [Hydrocodone-Acetaminophen] Other (See Comments)     Abdominal pain constipation        REVIEW OF SYSTEMS:  General ROS:  No weight loss ,no fatigue     ENT ROS:   No Sore throat ,no lymphoadenopathy,no nasal stuffiness     Hematological and Lymphatic ROS:   No ecchymosis ,no tendency to bleed  Respiratory ROS:   SOB  Cardiovascular ROS:   No CP,No Palpitation   Gastrointestinal ROS:   No Gi bleed,no nausea or vomiting      - Musculoskeletal ROS:      - no joint swelling ,no joint pain Neurological ROS:     -no weakness or numbness    Dermatological ROS:   No skin rash ,no urticaria     PHYSICAL EXAMINATION:     VITAL SIGNS:  BP (!) 104/55   Pulse 70   Temp 97.2 °F (36.2 °C) (Oral)   Resp 18   Ht 5' 9\" (1.753 m)   Wt 236 lb (107 kg)   SpO2 97%   BMI 34.85 kg/m²   Wt Readings from Last 3 Encounters:   06/27/21 236 lb (107 kg)   06/15/21 210 lb (95.3 kg)   06/14/21 210 lb (95.3 kg)     Temp Readings from Last 3 Encounters:   07/01/21 97.2 °F (36.2 °C) (Oral)   06/17/21 98.5 °F (36.9 °C) (Temporal)   06/14/21 97.3 °F (36.3 °C)     TMAX:  BP Readings from Last 3 Encounters:   07/01/21 (!) 104/55   06/17/21 (!) 120/58   06/14/21 (!) 154/74     Pulse Readings from Last 3 Encounters:   07/01/21 70   06/17/21 80   06/14/21 88           INTAKE/OUTPUTS:  I/O last 3 completed shifts:   In: 65 [P.O.:660]  Out: -     Intake/Output Summary (Last 24 hours) at 7/1/2021 2351  Last data filed at 7/1/2021 1912  Gross per 24 hour   Intake 660 ml   Output --   Net 660 ml       General Appearance: alert and oriented to person, place and time, well-developed and   well-nourished, in no acute distress   Eyes: pupils equal, round, and reactive to light, extraocular eye movements intact, conjunctivae normal and sclera anicteric   Neck: neck supple and non tender without mass, no thyromegaly, no thyroid nodules and no cervical adenopathy   Pulmonary/Chest:rhonchi   Cardiovascular: normal rate, regular rhythm, normal S1 and S2, no murmurs, rubs, clicks or gallops, distal pulses intact, no carotid bruits, no murmurs, no gallops, no carotid bruits and no JVD   Abdomen: obese, soft, non-tender, non-distended, normal bowel sounds, no masses or organomegaly   Extremities:no edema   Musculoskeletal: normal range of motion, no joint swelling, deformity or tenderness   Neurologic: reflexes normal and symmetric, no cranial nerve deficit noted    LABS/IMAGING:    CBC:  Lab Results   Component Value Date    WBC 6.0 07/01/2021 HGB 8.2 (L) 07/01/2021    HCT 24.1 (L) 07/01/2021    .0 (H) 07/01/2021     07/01/2021    LYMPHOPCT 14.0 (L) 06/22/2021    RBC 2.21 (L) 07/01/2021    MCH 37.1 (H) 07/01/2021    MCHC 34.0 07/01/2021    RDW 15.8 (H) 07/01/2021    NEUTOPHILPCT 78.1 06/22/2021    MONOPCT 7.0 06/22/2021    BASOPCT 0.2 06/22/2021    NEUTROABS 3.71 06/22/2021    LYMPHSABS 0.66 (L) 06/22/2021    MONOSABS 0.33 06/22/2021    EOSABS 0.00 (L) 06/22/2021    BASOSABS 0.00 06/22/2021       Recent Labs     07/01/21  0636 06/30/21  1214 06/29/21  0521   WBC 6.0 6.2 5.9   HGB 8.2* 7.7* 7.0*   HCT 24.1* 23.7* 21.4*   .0* 111.3* 113.2*    182 190       BMP:   Recent Labs     06/29/21  0521 06/30/21  0518 07/01/21  0636    149* 143   K 3.9 4.2 4.0   CL 97* 99 103   CO2 29 26 27   BUN 39* 35* 30*   CREATININE 1.3* 1.2 1.2       MG:   Lab Results   Component Value Date    MG 1.7 06/21/2021     Ca/Phos:   Lab Results   Component Value Date    CALCIUM 8.6 07/01/2021     Amylase: No results found for: AMYLASE  Lipase:   Lab Results   Component Value Date    LIPASE 14 12/22/2020     LIVER PROFILE:   Recent Labs     06/29/21  0521 06/30/21  0518 07/01/21  0636   AST 13 18 16   ALT 35 28 22   BILITOT 0.9 1.1 1.4*   ALKPHOS 149* 162* 162*       PT/INR: No results for input(s): PROTIME, INR in the last 72 hours. APTT: No results for input(s): APTT in the last 72 hours.     Cardiac Enzymes:  Lab Results   Component Value Date    CKTOTAL 339 (H) 06/16/2021    CKMB 2.2 06/21/2021    TROPONINI 0.03 12/22/2020               MICROBIOLOGY:      CXR:    PFTs:    2D Echocardiogram:    CT Chest:    PROBLEM LIST:  Patient Active Problem List   Diagnosis    Hypertension    Hypercholesterolemia    CAD (coronary artery disease)    Benign prostatic hyperplasia    CTS (carpal tunnel syndrome)    Cervical spondylosis    DM type 2 (diabetes mellitus, type 2) (HCC)    Exogenous obesity    OA (osteoarthritis)    Polyp of colon    Vitamin B12 deficiency    Vitamin D deficiency    Morbidly obese (HCC)    Asthmatic bronchitis, mild intermittent, uncomplicated    Chest pain    Generalized weakness    NSTEMI (non-ST elevated myocardial infarction) ([facility])               ASSESSMENT:  1.) Possible right side pneumonia   2. )CHF/CAD   3.)Possible COPD   4.)Possible AMANDA  5.)      PLAN:  *-start Zosyn ,work up for Pneumonia  *-repeat BNP   ,*-May need IV Lasix   *-crp,sed rate ,LDH  *_if in distress ,BiPap 14/7  *-BD'  *-incentive Spirometry   *-PFT'  If not better next 24 h then ct chest  *=-sputum culture      Thank you very much for allowing me to participate in the care of this pleasant patient , should you have any questions ,please do not hesitate to contact me      Bill Goncalves MD,[facility]  Pulmonary&Critical Care Medicine   Director of [address] [place] [address] Director of [address] [place] [address]    Trupti Cedeño    NOTE: This report was transcribed using voice recognition software. Every effort was made to ensure accuracy; however, inadvertent computerized transcription errors may be present.

## 2021-07-02 NOTE — CARE COORDINATION
Care Coordination: DC plan [facility]. NO PRECERT as medicare, No repeat labs, IV zosyn was started by pulmonary. Per nursing, more alert today. 3 ltr  97%, will check to wean. Checking  With pulmonary for dc disposition. If no dc, will check downgrade to med surg.     Gisselle Harley

## 2021-07-03 NOTE — PROGRESS NOTES
Message sent to Dr. Dusty Jerez via perfect serve regarding patient's drip panel results. Dr. Dusty Jerez returned message and states patient is ok for discharge.    Nic Aiken RN

## 2021-07-03 NOTE — PROGRESS NOTES
Dr Mary Daley called to report no recorded output since 1000 on 7/2/21 and 920 cc on bladder scan. Pt poor historian but stated he needed to void but was unable. Order for 1 x straight cath obtained and then reassess in AM. Will monitor output.  Electronically signed by Radha Del Valle RN

## 2021-07-03 NOTE — PROGRESS NOTES
AVS and H&P faxed to [facility] [phone] per RN request.   Janett Pulliam RN

## 2021-07-03 NOTE — PROGRESS NOTES
Dr. Nay Rowe paged through the answering service regarding if patient is ok for discharge. Dr. Nay Rowe returned call and states he will look into the chart and there should be no problem, but will let me know. Dr. Nay Rowe notified respiratory panel results. [address] Nay Rowe called unit and states patient is ok for discharge.      Matt Rolle RN

## 2021-07-03 NOTE — CARE COORDINATION
RN notified that Pt to receive uit of blood then can be discharged. Set up Transport with [facility] for  at [address] [place] [address] that Pt was being discharged after getting unit of blood and [facility] at [facility]. Called Floor to inform GAIL Castro of number and time. Notified Wife of Discharge time.    Beronica Ng, L.S.W.  [phone]

## 2021-07-03 NOTE — PROGRESS NOTES
Fely Greer MD FACP  Progress notes      CHIEF COMPLAINT: Chest pain      HISTORY OF PRESENT ILLNESS:   1021/21   78year-old  woman in the emergency room at [facility] earlier this morning  The son and a registered nurse were at bedside  Data reviewed in detail  Spoke with the ER physician in person  Patient with known history of coronary artery disease had an angioplasty many years ago for the LAD  Recently hospitalized with acute cystitis  Refused to see cardiologist on that admission  Now presents with onset of anterior chest discomfort described as a heaviness that woke him up at 5 AM  Nitroglycerin sublingual apparently relieved the pain in the ER  Troponins BNP were markedly elevated along with EKG changes suggestive of acute non-ST elevation myocardial infarction  He also reports shortness of breath even at rest  6/22/2021  Patient was seen on the floor earlier this morning  No further angina  On IV heparin infusion  Tolerating medications well  Data reviewed in detail  6/23/21  Patient was seen on the floor earlier this morning  Son was at bedside  Overnight delirium confusion requiring restraints and Haldol  Upon explaining things to the patient he seemed to understand what I was saying  Coronary angiography showing multivessel disease with severe left ventricular dysfunction  Spoke with the son at length  6/24/2021  Patient was seen on the floor earlier this morning son at bedside  Overnight events noted that included delirium and agitation  Remains confused this morning  Lower extremity restraints on  Barrera catheter intact  Data reviewed in detail  He denied chest pain  6/25/21  Patient was seen on the floor earlier this afternoon  Scheduled for stenting of the coronary  Pain-free  Confused disoriented  In restraints  6/26/2021  78year-old man seen on the floor earlier this morning  Family members at bedside  Spoke with the nursing staff  On supplemental oxygen  Oriented to person only  Tolerating medications  Mental status somewhat better  6/27/2021  Patient was seen on the floor earlier this morning  Nursing staff at bedside  Sitter at bedside  Remains disoriented  Spoke with cardiology  No further chest pain or shortness of breath  Did not sleep well due to agitation  6/28/2021  Patient was seen on the floor earlier this morning  Somewhat sleepy  Sitter at bedside  Easily arousable  Denies any complaints  Disoriented  6/29/2021  Patient was seen on the floor earlier this morning  Spoke with the son  Spoke with the nursing staff  His mentation is somewhat better with less of agitation and confusion  Still disoriented  Overall somewhat better  6/30/2021  Patient was seen on the floor earlier this morning  Data reviewed in detail  Still confused disoriented and pulling lines out  7/1/21  Patient was seen on the floor earlier this morning  Registered nurse at bedside  On 2 L of oxygen nasal cannula  Remains disoriented  Son at bedside  Data reviewed in detail  Chest x-ray showing pneumonia  7/2/2021  Patient was seen on the floor earlier this morning  Remains somewhat confused but much more alert  Still has a cough  On supplemental oxygen  Data reviewed in detail  7/3/21  Pt seen on the floor this am   Remains same   data noted  Still anemic  Past Medical History:    Past Medical History:   Diagnosis Date    Arthritis     bilateral knee    BPH (benign prostatic hypertrophy) 8/4/2016    CAD (coronary artery disease)     Cervical spondylosis 8/4/2016    Constipation     prune juice in am    CTS (carpal tunnel syndrome) 8/4/2016    Diabetes mellitus ([facility] [address]     DM type 2 (diabetes mellitus, type 2) ([facility] [address] 8/4/2016    Exogenous obesity 8/4/2016    Glaucoma     bilateral    History of EKG 10/14/2015 per Dr Eli Patel on chart.     Hypercholesterolemia     Hypertension     OA (osteoarthritis) 8/4/2016    Partial blindness     left eye    Polyp of colon 8/4/2016    PONV (postoperative nausea and vomiting)     Preoperative clearance 2015    per Dr Jackelin Barnett on chart; for right tjak Dr Hong Harvey Vitamin B12 deficiency 2016    Vitamin D deficiency 2016    Wears glasses        Past Surgical History:    Past Surgical History:   Procedure Laterality Date    CARDIAC CATHETERIZATION  2021    [facility]    CATARACT REMOVAL WITH IMPLANT Bilateral     COLONOSCOPY  10/31/2013    DIAGNOSTIC CARDIAC CATH LAB PROCEDURE   approximately    ? stent    EYE SURGERY Bilateral     cataract    JOINT REPLACEMENT Right 2015    SHOULDER ARTHROPLASTY Left 2000    TOTAL KNEE ARTHROPLASTY Right 2015       Medications Prior to Admission:    Medications Prior to Admission: metFORMIN (GLUCOPHAGE-XR) 500 MG extended release tablet, Take 1 tablet by mouth 3 times daily (with meals)  [] cefdinir (OMNICEF) 300 MG capsule, Take 1 capsule by mouth 2 times daily for 10 days  pantoprazole (PROTONIX) 40 MG tablet, TAKE 1 TABLET BY MOUTH EVERY DAY BEFORE BREAKFAST  oxybutynin (DITROPAN XL) 15 MG extended release tablet, Take 15 mg by mouth daily  valsartan (DIOVAN) 80 MG tablet, Take 1 tablet by mouth daily  amLODIPine (NORVASC) 5 MG tablet, TAKE 1 TABLET BY MOUTH EVERY DAY AT NIGHT  metoprolol tartrate (LOPRESSOR) 50 MG tablet, TAKE 1 TABLET BY MOUTH EVERY DAY  atorvastatin (LIPITOR) 40 MG tablet, TAKE 1 TABLET BY MOUTH EVERY DAY  dapagliflozin (FARXIGA) 10 MG tablet, TAKE 1 TABLET BY MOUTH EVERY DAY IN THE MORNING  vitamin E 400 UNIT capsule, Take 1 capsule by mouth daily  mirabegron (MYRBETRIQ) 25 MG TB24, Take 1 tablet by mouth three times a week Mon-Wed-& Friday  tamsulosin (FLOMAX) 0.4 MG capsule, TAKE 1 CAPSULE BY MOUTH EVERYDAY AT BEDTIME  blood glucose test strips (FREESTYLE TEST STRIPS) strip, Indications: freestyle test strips DX: E11.9 As needed.     Diagnosis is E 11.9  FREESTYLE LANCETS MISC, 1 each by Does not apply route daily Indications: in AM DX: E11.9  Continuous Blood Gluc Sensor (PowerFileYLE MAYELA SENSOR SYSTEM) MISC, 1 Units by Does not apply route 3 times daily  Continuous Blood Gluc  (FREESTYLE MAYELA READER) WALTER, 1 Units by Does not apply route 3 times daily e11.9  aspirin 81 MG EC tablet, Take 81 mg by mouth nightly  Coenzyme Q10 (CO Q-10) 100 MG CAPS, Take 100 mg by mouth nightly  Omega-3 Fatty Acids (FISH OIL) 300 MG CAPS, Take 1 capsule by mouth Daily with supper  vitamin B-12 (CYANOCOBALAMIN) 1000 MCG tablet, Take 1,000 mcg by mouth daily  Ascorbic Acid (VITAMIN C) 500 MG tablet, Take 500 mg by mouth daily  latanoprost (XALATAN) 0.005 % ophthalmic solution, Place 1 drop into the right eye nightly   vitamin D (ERGOCALCIFEROL) 1.25 MG (61628 UT) CAPS capsule, Take 1 capsule by mouth once a week for 12 doses (Patient taking differently: Take 50,000 Units by mouth once a week - Takes on Wednesday)    Allergies:    Ace inhibitors; Anesthetics, amide; and Vicodin [hydrocodone-acetaminophen]    Social History:    reports that he quit smoking about 40 years ago. His smoking use included cigars. He started smoking about 67 years ago. He has a 120.00 pack-year smoking history. He quit smokeless tobacco use about 5 years ago. He reports that he does not drink alcohol and does not use drugs. Family History:   family history includes Heart Disease in his mother; Heart Disease (age of onset: 52) in his father. REVIEW OF SYSTEMS:  As above in the HPI, otherwise negative    PHYSICAL EXAM:    Vitals:  /62   Pulse 70   Temp 97 °F (36.1 °C) (Temporal)   Resp 19   Ht 5' 9\" (1.753 m)   Wt 236 lb (107 kg)   SpO2 97%   BMI 34.85 kg/m²     General: alert  Disoriented  . Well developed, well nourished, well groomed. Morbidly obese  HEENT:  Normocephalic, atraumatic. Pupils equal, round, reactive to light. No scleral icterus. No conjunctival injection. .  No nasal discharge.   Neck:  Supple  Heart:  RRR, no murmurs, gallops, rubs  Lungs: Scattered rails in the posterior lung fields noted although chest x-ray reveals no pulmonary edema  Abdomen:   Bowel sounds present, soft, nontender, no masses, no organomegaly, no peritoneal signs  Extremities:  No clubbing, cyanosis, or edema  Skin:  Warm and dry, no open lesions or rash  Neuro:  Cranial nerves 2-12 intact, no focal deficits  Breast: deferred  Rectal: deferred  Genitalia:  deferred    LABS: Data reviewed in detail      ASSESSMENT:      Anemia of chronic disease i.e. CKD  Transfused iron  Received 1 unit of packed cells earlier  Pulmonary consult to rule out pneumonia  Active Problems:    NSTEMI (non-ST elevated myocardial infarction) ([facility] [address]  Acute CHF likely diastolic based on ischemic event  Multivessel coronary artery disease noted on angiography with a LV dysfunction  Acute delirium  Patient Active Problem List   Diagnosis    Hypertension    Hypercholesterolemia    CAD (coronary artery disease)    Benign prostatic hyperplasia    CTS (carpal tunnel syndrome)    Cervical spondylosis    DM type 2 [facility]di[facility] mellitus[facility] type 2) ([facility])    Exogenous obesity    OA (osteoarthritis)    Polyp of colon    Vitamin B12 deficiency    Vitamin D deficiency    Morbidly obese (HCC)    Asthmatic bronchitis, mild intermittent, uncomplicated    Chest pain    Generalized weakness    NSTEMI (non-ST elevated myocardial infarction) ([facility])           PLAN:  Consult pulmonary/input appreciated  in view of recent MI transfusion is indicated  spoke with son and wife multiple times  Proceed with placement to subacute rehab  Medical management for coronary artery disease  Dc to [facility] after transfusion    Jeremy Brown MD  9:12 AM  7/3/2021

## 2021-07-03 NOTE — PROGRESS NOTES
[facility] [address]  [address], [address]  JACK HERNÁNDEZ [facility], [address] [place] [address]      Pulmonary Progress Note      SUBJECTIVE:  Pt being seen for Dr Elver Sarah   Awake, conversant though looking weak, cough sounding ronchorous but claims not much sputum production, on 3 li NC, stable with breathing.     OBJECTIVE    Medications    Continuous Infusions:   sodium chloride      sodium chloride      sodium chloride      dextrose         Scheduled Meds:   piperacillin-tazobactam  3,375 mg Intravenous Q8H    QUEtiapine  25 mg Oral Nightly    furosemide  20 mg Oral Daily    isosorbide mononitrate  60 mg Oral Daily    clopidogrel  75 mg Oral Daily    ipratropium-albuterol  1 ampule Inhalation Q6H    sodium chloride flush  5-40 mL Intravenous 2 times per day    carvedilol  6.25 mg Oral BID WC    aspirin  81 mg Oral Daily    atorvastatin  40 mg Oral Daily    latanoprost  1 drop Right Eye Nightly    pantoprazole  40 mg Oral QAM AC    tamsulosin  0.4 mg Oral Daily    vitamin D  50,000 Units Oral Weekly    insulin lispro  0-12 Units Subcutaneous TID WC    insulin lispro  0-6 Units Subcutaneous Nightly       PRN Meds:sodium chloride, guaiFENesin-dextromethorphan, sodium chloride, sodium chloride flush, sodium chloride, glucose, dextrose, glucagon (rDNA), dextrose, acetaminophen    Physical    VITALS:  BP (!) 122/58   Pulse 69   Temp 97.6 °F (36.4 °C) (Oral)   Resp 20   Ht 5' 9\" (1.753 m)   Wt 236 lb (107 kg)   SpO2 97%   BMI 34.85 kg/m²     24HR INTAKE/OUTPUT:      Intake/Output Summary (Last 24 hours) at 7/3/2021 5504  Last data filed at 7/3/2021 1643  Gross per 24 hour   Intake 660 ml   Output 900 ml   Net -240 ml       24HR PULSE OXIMETRY RANGE:    SpO2  Av.6 %  Min: 94 %  Max: 98 %    General appearance: alert, appears stated age and cooperative  Lungs: rhonchi bibasilar  Heart: regular rate and rhythm, S1, S2 normal, no murmur, click, rub or gallop  Abdomen: soft, non-tender; bowel sounds normal; no masses,  no organomegaly  Extremities: extremities normal, atraumatic, no cyanosis or edema  Neurologic: Mental status: Alert, oriented, thought content appropriate    Data    CBC:   Recent Labs     07/01/21  0636 07/03/21  0526   WBC 6.0 5.4   HGB 8.2* 7.5*   HCT 24.1* 22.5*   .0* 108.2*    180       BMP:  Recent Labs     07/01/21  0636 07/02/21  0532 07/03/21  0526    140 137   K 4.0 4.0 3.5    102 99   CO2 27 28 26   BUN 30* 28* 25*   CREATININE 1.2 1.3* 1.5*    ALB:3,BILIDIR:3,BILITOT:3,ALKPHOS:3)@    PT/INR: No results for input(s): PROTIME, INR in the last 72 hours. ABG:   No results for input(s): PH, PO2, PCO2, HCO3, BE, O2SAT, METHB, O2HB, COHB, O2CON, HHB, THB in the last 72 hours. FiO2 : 40 %       Radiology/Other tests reviewed: none    Assessment:     Active Problems:    NSTEMI (non-ST elevated myocardial infarction) ([facility] [address]  Resolved Problems:    * No resolved hospital problems. *      Plan:       1. Viral panel (+) rhinovirus, doing some better, can hold off on antibiotics  2. Watch fluid balance, diurese as needed  3. Cont with oxygen, taper as tolerated  4. Cont with nebs, observe respiratory function and cough  5. Flutter device and see if helps with expectoration  6. Can be discharged from pulmonary pov      Time at the bedside, reviewing labs and radiographs, reviewing notes and consultations, discussing with staff and family was more than 35 minutes. Thanks for letting us see this patient in consultation. Please contact us with any questions. Office [phone] or after hours through [employer], x [phone].

## 2021-07-03 NOTE — PROGRESS NOTES
Spoke with weekend  Dawit regarding patient is ok for discharge to [facility]. Patient needs a unit of blood and needs transport set up for later.    Stacy Longo RN

## 2021-07-03 NOTE — DISCHARGE INSTR - COC
Continuity of Care Form    Patient Name: Debora Olivarez   :  1940  MRN:  [id number]    Admit date:  2021  Discharge date:  2021    Code Status Order: DNR-CCA   Advance Directives:   Advance Care Flowsheet Documentation       Date/Time Healthcare Directive Type of Healthcare Directive Copy in [address] Agent's Name Healthcare Agent's Phone Number    21 1205  No, patient does not have an advance directive for healthcare treatment -- -- -- -- --            Admitting Physician:  Yogesh Mckinnon MD  PCP: Yogesh Mckinnon MD    Discharging Nurse: [address]/Room#: 4908/7844-V  Discharging Unit Phone Number: [phone]    Emergency Contact:   Extended Emergency Contact Information  Primary Emergency Contact: [place]  Address: [address] [place] [address]  Home Phone: [phone]  Relation: Spouse  Secondary Emergency Contact: [place] Phone: [phone]  Relation: Child    Past Surgical History:  Past Surgical History:   Procedure Laterality Date    CARDIAC CATHETERIZATION  2021    [facility]    CATARACT REMOVAL WITH IMPLANT Bilateral     COLONOSCOPY  10/31/2013    DIAGNOSTIC CARDIAC CATH LAB PROCEDURE   approximately    ? stent    EYE SURGERY Bilateral     cataract    JOINT REPLACEMENT Right 2015    SHOULDER ARTHROPLASTY Left 2000    TOTAL KNEE ARTHROPLASTY Right 2015       Immunization History:   Immunization History   Administered Date(s) Administered    COVID-19, Weller Peter, PF, 30mcg/0.3mL 2021, 02/10/2021    Influenza, High Dose (Fluzone 65 yrs and older) 2011, 09/10/2012, 10/24/2014, 10/06/2015, 2016, 2017, 2018, 2019    Influenza, High-dose, Quadv, 65 yrs +, IM (Fluzone) 10/01/2020    Pneumococcal Conjugate 13-valent (Jzekoaj81) 10/06/2015    Pneumococcal Polysaccharide (Kxlxctdtf55) 1997, 2001, 12/09/2014    Zoster Live (Zostavax) 12/19/2011       Active Problems:  Patient Active Problem List   Diagnosis Code    Hypertension I10    Hypercholesterolemia E78.00    CAD (coronary artery disease) I25.10    Benign prostatic hyperplasia N40.0    CTS (carpal tunnel syndrome) G56.00    Cervical spondylosis M47.812    DM type 2 (diabetes mellitus, type 2) ([facility] E11.9    Exogenous obesity E66.09    OA (osteoarthritis) M19.90    Polyp of colon K63.5    Vitamin B12 deficiency E53.8    Vitamin D deficiency E55.9    Morbidly obese (HCC) E66.01    Asthmatic bronchitis, mild intermittent, uncomplicated S27.83    Chest pain R07.9    Generalized weakness R53.1    NSTEMI (non-ST elevated myocardial infarction) ([facility] [address] I21.4       Isolation/Infection:   Isolation            No Isolation          Patient Infection Status       Infection Onset Added Last Indicated Last Indicated By Review Planned Expiration Resolved Resolved By    COVID-19 Rule Out 07/03/21 07/03/21 07/03/21 Respiratory Panel, Molecular, with COVID-19 (Restricted: peds pts or suitable admitted adults) (Ordered) 07/10/21 07/17/21      Resolved    C-diff Rule Out 06/27/21 06/27/21 06/27/21 CLOSTRIDIUM DIFFICILE EIA (Ordered)   06/28/21 Terri Gregory RN    Order discontinued by RN/physician    C-diff Rule Out 12/22/20 12/22/20 12/22/20 CLOSTRIDIUM DIFFICILE EIA (Ordered)   12/23/20 Rule-Out Test Resulted    COVID-19 Rule Out 12/22/20 12/22/20 12/22/20 COVID-19 (Ordered)   12/22/20 Rule-Out Test Resulted            Nurse Assessment:  Last Vital Signs: BP (!) 101/52   Pulse 68   Temp 98.7 °F (37.1 °C) (Temporal)   Resp 16   Ht 5' 9\" (1.753 m)   Wt 236 lb (107 kg)   SpO2 97%   BMI 34.85 kg/m²     Last documented pain score (0-10 scale): Pain Level: 0  Last Weight:   Wt Readings from Last 1 Encounters:   06/27/21 236 lb (107 kg)     Mental Status:  disoriented and alert alert to self always sometimes to place and year    IV Access:  - applicable)   · Name:  · Address:  · Dialysis Schedule:  · Phone:  · Fax:    / signature: {Esignature:[id number]:::0}    PHYSICIAN SECTION    Prognosis: {Prognosis:0166992710:::0}    Condition at Discharge: Estrada Hinson Patient Condition:551980615:::0}    Rehab Potential (if transferring to Rehab): {Prognosis:0619859427:::0}    Recommended Labs or Other Treatments After Discharge: ***    Physician Certification: I certify the above information and transfer of Fátima Torres  is necessary for the continuing treatment of the diagnosis listed and that he requires {Admit to Appropriate Level of Care:07221:::0} for {GREATER/LESS:200570103} 30 days.      Update Admission H&P: {CHP DME Changes in HandP:622692149:::0}    PHYSICIAN SIGNATURE:  Bj Ramirez MD

## 2021-07-04 NOTE — DISCHARGE SUMMARY
Physician Discharge Summary     Patient ID:  Tangela Mathias  [id number]  78 y.o.  1940    Admit date: 6/21/2021    Discharge date and time: 7/3/2021  5:48 PM     Admission Diagnoses: NSTEMI (non-ST elevated myocardial infarction) [facility]) [I21.4]    Discharge Diagnoses:   Non-ST elevation acute myocardial infarction  Acute on chronic systolic heart failure  Acute hypoxic respiratory failure  Hypoxic encephalopathy  Acute delirium  RSV tracheobronchitis and pneumonitis  Anemia of chronic kidney disease  Poorly controlled type 2 diabetes mellitus  Morbid obesity  Patient Active Problem List   Diagnosis    Hypertension    Hypercholesterolemia    CAD (coronary artery disease)    Benign prostatic hyperplasia    CTS (carpal tunnel syndrome)    Cervical spondylosis    DM type 2 (diabetes mellitus, type 2) (HCC)    Exogenous obesity    OA (osteoarthritis)    Polyp of colon    Vitamin B12 deficiency    Vitamin D deficiency    Morbidly obese (HCC)    Asthmatic bronchitis, mild intermittent, uncomplicated    Chest pain    Generalized weakness    NSTEMI (non-ST elevated myocardial infarction) [facility])       Consults: Cardiology and pulmonary    Procedures:     Hospital Course:   77-year-old  man admitted through emergency room due to chest pain  Chest pain resolved with nitroglycerin and heparin infusion  Heart catheterization was canceled due to the inability for patient to lie down flat  Family decided to keep him DNR CCA  Medical therapy recommended by cardiology for coronary artery disease  CHF was treated with IV diuretics  Patient developed delirium due to hypoxia as he did not keep the oxygen on  He needed restraints  Overall his mental status improved  Empiric antibiotics with Zosyn was given due to suspected pneumonia  RSV rhinovirus were found  Anemia was treated with 2 units of packed cells and 1 infusion of iron  He was stable at the time of discharge  Multiple discussions were held with family members    Discharge Exam:  See progress note from today    Disposition: Discharged to subacute rehab  Stable at the time of discharge    Patient Instructions:   Discharge Medication List as of 7/3/2021  4:16 PM      START taking these medications    Details   isosorbide mononitrate (IMDUR) 60 MG extended release tablet Take 1 tablet by mouth daily, Disp-30 tablet, R-3DC to SNF      ipratropium-albuterol (DUONEB) 0.5-2.5 (3) MG/3ML SOLN nebulizer solution Inhale 3 mLs into the lungs every 6 hours, Disp-360 mLDC to SNF      QUEtiapine (SEROQUEL) 25 MG tablet Take 1 tablet by mouth nightly, Disp-60 tablet, R-3DC to SNF      carvedilol (COREG) 6.25 MG tablet Take 1 tablet by mouth 2 times daily (with meals), Disp-60 tablet, R-3DC to SNF      furosemide (LASIX) 20 MG tablet Take 1 tablet by mouth daily, Disp-60 tablet, R-3DC to SNF      clopidogrel (PLAVIX) 75 MG tablet Take 1 tablet by mouth daily, Disp-30 tablet, R-3DC to SNF         CONTINUE these medications which have NOT CHANGED    Details   metFORMIN (GLUCOPHAGE-XR) 500 MG extended release tablet Take 1 tablet by mouth 3 times daily (with meals), Disp-270 tablet, R-0Normal      pantoprazole (PROTONIX) 40 MG tablet TAKE 1 TABLET BY MOUTH EVERY DAY BEFORE BREAKFAST, Disp-90 tablet, R-0Normal      atorvastatin (LIPITOR) 40 MG tablet TAKE 1 TABLET BY MOUTH EVERY DAY, Disp-90 tablet, R-0Normal      vitamin D (ERGOCALCIFEROL) 1.25 MG (25619 UT) CAPS capsule Take 1 capsule by mouth once a week for 12 doses, Disp-12 capsule, R-3PATIENT WOULD LIKE A REFILL.  THANK YOUNormal      dapagliflozin (FARXIGA) 10 MG tablet TAKE 1 TABLET BY MOUTH EVERY DAY IN THE MORNING, Disp-90 tablet, R-1Normal      tamsulosin (FLOMAX) 0.4 MG capsule TAKE 1 CAPSULE BY MOUTH EVERYDAY AT BEDTIME, R-3Historical Med      aspirin 81 MG EC tablet Take 81 mg by mouth nightlyHistorical Med      Coenzyme Q10 (CO Q-10) 100 MG CAPS Take 100 mg by mouth nightlyHistorical Med      Omega-3 Fatty Acids (FISH OIL) 300 MG CAPS Take 1 capsule by mouth Daily with supperHistorical Med      vitamin B-12 (CYANOCOBALAMIN) 1000 MCG tablet Take 1,000 mcg by mouth dailyHistorical Med      Ascorbic Acid (VITAMIN C) 500 MG tablet Take 500 mg by mouth dailyHistorical Med      latanoprost (XALATAN) 0.005 % ophthalmic solution Place 1 drop into the right eye nightly , R-4Historical Med         STOP taking these medications       cefdinir (OMNICEF) 300 MG capsule Comments:   Reason for Stopping:         oxybutynin (DITROPAN XL) 15 MG extended release tablet Comments:   Reason for Stopping:         valsartan (DIOVAN) 80 MG tablet Comments:   Reason for Stopping:         amLODIPine (NORVASC) 5 MG tablet Comments:   Reason for Stopping:         metoprolol tartrate (LOPRESSOR) 50 MG tablet Comments:   Reason for Stopping:         vitamin E 400 UNIT capsule Comments:   Reason for Stopping:         mirabegron (MYRBETRIQ) 25 MG TB24 Comments:   Reason for Stopping:         blood glucose test strips (FREESTYLE TEST STRIPS) strip Comments:   Reason for Stopping:         FREESTYLE LANCETS MISC Comments:   Reason for Stopping:         Continuous Blood Gluc Sensor ([address] [place] [address]) MISC Comments:   Reason for Stopping:         Continuous Blood Gluc  (FREESTYLE MAYELA READER) WALTER Comments:   Reason for Stopping:             Activity: As tolerated  Diet: Cardiac    Follow-up with PCP    Note that over 40 minutes was spent in preparing discharge papers, discussing discharge with patient, medication review, etc.    Signed:  Pierre Garcia MD  7/4/2021  9:26 AM

## 2021-07-06 NOTE — CARE COORDINATION
[facility] Transitions Follow Up Call    2021    Patient: Raffy Dennis  Patient : 1940   MRN: [id number]  Reason for Admission:   Discharge Date: 7/3/21 RARS: Readmission Risk Score: 28       Patient discharged to [facility] on 7/3/2021. E-mail notification forwarded to [employer].      Ga Maguire RN

## 2021-07-06 NOTE — PROGRESS NOTES
[facility]  Department of Family Medicine  Family Medicine Residency Program      Patient: Zoë Toney 78 y.o. male     Date of Service: 7/6/21      Chief complaint: No chief complaint on file. HISTORY OF PRESENTING ILLNESS     Mr. Tip Pinto is a 78year old male with a past medical history of CAD, CHF, s/p angioplasty,Type 2 DM, hyperlipidemia, HTN, anemia who was admitted to the hospital due to chest pain on 06/21/2021. His chest pain was treated with nitroglycerin and heparin. He was unable to get a heart catheterization due to inability to lie down flat. He was treated with IV diuretics for CHF. He was also empirically treated with Zosyn for suspected pneumonia. He also received 2 units of PRBCs and IV iron x1 for anemia. He did have some delirium in the hospital and required restraints and haldol. He was discharged on 07/03 to [facility]. Health Maintenance:  Health Maintenance Due   Topic Date Due    Diabetic retinal exam  Never done    DTaP/Tdap/Td vaccine (1 - Tdap) Never done    Shingles Vaccine (2 of 3) 02/13/2012    Annual Wellness Visit (AWV)  Never done    Diabetic foot exam  08/26/2020     Past Medical History:      Diagnosis Date    Arthritis     bilateral knee    BPH (benign prostatic hypertrophy) 8/4/2016    CAD (coronary artery disease)     Cervical spondylosis 8/4/2016    Constipation     prune juice in am    CTS (carpal tunnel syndrome) 8/4/2016    Diabetes mellitus ([facility] [address]     DM type 2 (diabetes mellitus, type 2) ([facility] [address] 8/4/2016    Exogenous obesity 8/4/2016    Glaucoma     bilateral    History of EKG 10/14/2015 per Dr Izabela Al on chart.     Hypercholesterolemia     Hypertension     OA (osteoarthritis) 8/4/2016    Partial blindness     left eye    Polyp of colon 8/4/2016    PONV (postoperative nausea and vomiting)     Preoperative clearance 11/12/2015    per Dr Izabela Al on chart; for right tjak Dr Janet Phan Vitamin B12 deficiency 2016    Vitamin D deficiency 2016    Wears glasses      Past Surgical History:        Procedure Laterality Date    CARDIAC CATHETERIZATION  2021    [facility]    CATARACT REMOVAL WITH IMPLANT Bilateral     COLONOSCOPY  10/31/2013    DIAGNOSTIC CARDIAC CATH LAB PROCEDURE   approximately    ? stent    EYE SURGERY Bilateral     cataract    JOINT REPLACEMENT Right 2015    SHOULDER ARTHROPLASTY Left 2000    TOTAL KNEE ARTHROPLASTY Right 2015     Allergies:    Ace inhibitors; Anesthetics, amide; and Vicodin [hydrocodone-acetaminophen]     Family History:       Problem Relation Age of Onset    Heart Disease Mother         AFIB    Heart Disease Father 52         MI     Review of Systems:   Review of Systems   Constitutional: Negative for activity change, chills and fever. HENT: Negative for rhinorrhea, sneezing and sore throat. Eyes: Negative for redness. Respiratory: Negative for cough, chest tightness and shortness of breath. Cardiovascular: Negative for chest pain, palpitations and leg swelling. Gastrointestinal: Negative for abdominal pain, blood in stool, diarrhea, nausea and vomiting. Genitourinary: Negative for decreased urine volume, dysuria, flank pain, frequency, hematuria and urgency. Musculoskeletal: Negative for arthralgias and myalgias. Neurological: Negative for dizziness, syncope, weakness, light-headedness and headaches. Psychiatric/Behavioral: Negative for agitation. The patient is not nervous/anxious. PHYSICAL EXAM   Vitals: There were no vitals taken for this visit. Physical Exam  Vitals reviewed. Constitutional:       Appearance: Normal appearance. HENT:      Head: Normocephalic and atraumatic. Cardiovascular:      Rate and Rhythm: Normal rate and regular rhythm. Pulses: Normal pulses. Heart sounds: Normal heart sounds. Pulmonary:      Effort: Pulmonary effort is normal. No respiratory distress.       Breath sounds: Normal breath sounds. No wheezing. Abdominal:      General: Bowel sounds are normal. There is no distension. Palpations: Abdomen is soft. Tenderness: There is no abdominal tenderness. There is no guarding. Musculoskeletal:         General: Normal range of motion. Cervical back: Normal range of motion. No rigidity. Skin:     General: Skin is warm. Neurological:      General: No focal deficit present. Mental Status: He is alert and oriented to person, place, and time. Psychiatric:         Mood and Affect: Mood normal.       ASSESSMENT AND PLAN     NSTEMI  Coronary angiography showing multivessel disease with severe left ventricular dysfunction  -Aspirin 81 mg daily  -Atorvastatin 40 mg daily  -Carvedilol 6.25 mg BID  -Plavix 75 mg daily    Hypokalemia  K+ 3.4, likely due to concurrent lasix use  -Klor-Con 20 mEq BID ordered x 1    BPH  -Flomax 0.4 mg nightly    CHF  Echo (06/23/21) showed EF 40-45%.  Stage II diastolic dysfunction  -Lasix 20 mg daily  -Isosorbide Dinitrate 60 mg daily    Glaucoma  -Lantanoprost 1 drop nightly    Type 2 DM  Hb A1C 6.7%  -Metformin 500 mg TID  -Farxiga 10 mg daily    Anemia  Hb 7.8, s/p 2 units of PRBC in hospital  -Repeat CBC ordered  -FOBT ordered  -Transfuse if Hb <8  -Protonix 40 mg daily  -Vitamin B12 1000 mcg daily    Mood disorder (unsure of the specifics)  -Seroquel 25 mg nightly    Medication List:    Current Outpatient Medications   Medication Sig Dispense Refill    isosorbide mononitrate (IMDUR) 60 MG extended release tablet Take 1 tablet by mouth daily 30 tablet 3    ipratropium-albuterol (DUONEB) 0.5-2.5 (3) MG/3ML SOLN nebulizer solution Inhale 3 mLs into the lungs every 6 hours 360 mL     QUEtiapine (SEROQUEL) 25 MG tablet Take 1 tablet by mouth nightly 60 tablet 3    carvedilol (COREG) 6.25 MG tablet Take 1 tablet by mouth 2 times daily (with meals) 60 tablet 3    furosemide (LASIX) 20 MG tablet Take 1 tablet by mouth daily 60 tablet 3    clopidogrel (PLAVIX) 75 MG tablet Take 1 tablet by mouth daily 30 tablet 3    metFORMIN (GLUCOPHAGE-XR) 500 MG extended release tablet Take 1 tablet by mouth 3 times daily (with meals) 270 tablet 0    pantoprazole (PROTONIX) 40 MG tablet TAKE 1 TABLET BY MOUTH EVERY DAY BEFORE BREAKFAST 90 tablet 0    atorvastatin (LIPITOR) 40 MG tablet TAKE 1 TABLET BY MOUTH EVERY DAY 90 tablet 0    vitamin D (ERGOCALCIFEROL) 1.25 MG (25449 UT) CAPS capsule Take 1 capsule by mouth once a week for 12 doses (Patient taking differently: Take 50,000 Units by mouth once a week - Takes on Wednesday) 12 capsule 3    dapagliflozin (FARXIGA) 10 MG tablet TAKE 1 TABLET BY MOUTH EVERY DAY IN THE MORNING 90 tablet 1    tamsulosin (FLOMAX) 0.4 MG capsule TAKE 1 CAPSULE BY MOUTH EVERYDAY AT BEDTIME  3    aspirin 81 MG EC tablet Take 81 mg by mouth nightly      Coenzyme Q10 (CO Q-10) 100 MG CAPS Take 100 mg by mouth nightly      Omega-3 Fatty Acids (FISH OIL) 300 MG CAPS Take 1 capsule by mouth Daily with supper      vitamin B-12 (CYANOCOBALAMIN) 1000 MCG tablet Take 1,000 mcg by mouth daily      Ascorbic Acid (VITAMIN C) 500 MG tablet Take 500 mg by mouth daily      latanoprost (XALATAN) 0.005 % ophthalmic solution Place 1 drop into the right eye nightly   4     No current facility-administered medications for this visit.        Regina Trotter MD

## 2021-07-14 NOTE — TELEPHONE ENCOUNTER
Pt's spouse called about follow up appt. Offered to schedule one month follow up, she declined saying she will wait until after her  is discharged from rehab and call if he will come.

## 2021-07-15 NOTE — PROGRESS NOTES
Patient: Kamron Lazaro 78 y.o. male     Date of Service: 7/6/21      Chief complaint: No chief complaint on file. HISTORY OF PRESENTING ILLNESS     Mr. Mckayla Cano is a 78year old male with a past medical history of CAD, CHF, s/p angioplasty,Type 2 DM, hyperlipidemia, HTN, anemia who was admitted to the hospital due to chest pain on 06/21/2021. His chest pain was treated with nitroglycerin and heparin. He was unable to get a heart catheterization due to inability to lie down flat. He was treated with IV diuretics for CHF. He was also empirically treated with Zosyn for suspected pneumonia. He also received 2 units of PRBCs and IV iron x1 for anemia. He did have some delirium in the hospital and required restraints and haldol. He was discharged on 07/03 to [facility]. Health Maintenance:  Health Maintenance Due   Topic Date Due    Diabetic retinal exam  Never done    DTaP/Tdap/Td vaccine (1 - Tdap) Never done    Shingles Vaccine (2 of 3) 02/13/2012    Annual Wellness Visit (AWV)  Never done    Diabetic foot exam  08/26/2020     Past Medical History:      Diagnosis Date    Arthritis     bilateral knee    BPH (benign prostatic hypertrophy) 8/4/2016    CAD (coronary artery disease)     Cervical spondylosis 8/4/2016    Constipation     prune juice in am    CTS (carpal tunnel syndrome) 8/4/2016    Diabetes mellitus ([facility] [address]     DM type 2 (diabetes mellitus, type 2) ([facility] [address] 8/4/2016    Exogenous obesity 8/4/2016    Glaucoma     bilateral    History of EKG 10/14/2015 per Dr Jodi Bethea on chart.     Hypercholesterolemia     Hypertension     OA (osteoarthritis) 8/4/2016    Partial blindness     left eye    Polyp of colon 8/4/2016    PONV (postoperative nausea and vomiting)     Preoperative clearance 11/12/2015    per Dr Jodi Bethea on chart; for right tjak Dr Tiffanie Barth Vitamin B12 deficiency 8/4/2016    Vitamin D deficiency 8/4/2016    Wears glasses      Past Surgical History:        Procedure Laterality Date    CARDIAC CATHETERIZATION  2021    [place]    CATARACT REMOVAL WITH IMPLANT Bilateral     COLONOSCOPY  10/31/2013    DIAGNOSTIC CARDIAC CATH LAB PROCEDURE   approximately    ? stent    EYE SURGERY Bilateral     cataract    JOINT REPLACEMENT Right 2015    SHOULDER ARTHROPLASTY Left 2000    TOTAL KNEE ARTHROPLASTY Right 2015     Allergies:    Ace inhibitors; Anesthetics, amide; and Vicodin [hydrocodone-acetaminophen]     Family History:       Problem Relation Age of Onset    Heart Disease Mother         AFIB    Heart Disease Father 52         MI     Review of Systems:   Review of Systems   Constitutional: Negative for activity change, chills and fever. HENT: Negative for rhinorrhea, sneezing and sore throat. Eyes: Negative for redness. Respiratory: Negative for cough, chest tightness and shortness of breath. Cardiovascular: Negative for chest pain, palpitations and leg swelling. Gastrointestinal: Negative for abdominal pain, blood in stool, diarrhea, nausea and vomiting. Genitourinary: Negative for decreased urine volume, dysuria, flank pain, frequency, hematuria and urgency. Musculoskeletal: Negative for arthralgias and myalgias. Neurological: Negative for dizziness, syncope, weakness, light-headedness and headaches. Psychiatric/Behavioral: Negative for agitation. The patient is not nervous/anxious. PHYSICAL EXAM   Vitals: There were no vitals taken for this visit. Physical Exam  Vitals reviewed. Constitutional:       Appearance: Normal appearance. HENT:      Head: Normocephalic and atraumatic. Cardiovascular:      Rate and Rhythm: Normal rate and regular rhythm. Pulses: Normal pulses. Heart sounds: Normal heart sounds. Pulmonary:      Effort: Pulmonary effort is normal. No respiratory distress. Breath sounds: Normal breath sounds. No wheezing.    Abdominal:      General: Bowel sounds are normal. There is no distension. Palpations: Abdomen is soft. Tenderness: There is no abdominal tenderness. There is no guarding. Musculoskeletal:         General: Normal range of motion. Cervical back: Normal range of motion. No rigidity. Skin:     General: Skin is warm. Neurological:      General: No focal deficit present. Mental Status: He is alert and oriented to person, place, and time. Psychiatric:         Mood and Affect: Mood normal.       ASSESSMENT AND PLAN     NSTEMI  Coronary angiography showing multivessel disease with severe left ventricular dysfunction  -Aspirin 81 mg daily  -Atorvastatin 40 mg daily  -Carvedilol 6.25 mg BID  -Plavix 75 mg daily    Hypokalemia  K+ 3.4, likely due to concurrent lasix use  -Klor-Con 20 mEq BID ordered x 1    BPH  -Flomax 0.4 mg nightly    CHF  Echo (06/23/21) showed EF 40-45%.  Stage II diastolic dysfunction  -Lasix 20 mg daily  -Isosorbide Dinitrate 60 mg daily    Glaucoma  -Lantanoprost 1 drop nightly    Type 2 DM  Hb A1C 6.7%  -Metformin 500 mg TID  -Farxiga 10 mg daily    Anemia  Hb 7.8, s/p 2 units of PRBC in hospital  -Repeat CBC ordered  -FOBT ordered  -Transfuse if Hb <8  -Protonix 40 mg daily  -Vitamin B12 1000 mcg daily    Mood disorder (unsure of the specifics)  -Seroquel 25 mg nightly    Medication List:    Current Outpatient Medications   Medication Sig Dispense Refill    isosorbide mononitrate (IMDUR) 60 MG extended release tablet Take 1 tablet by mouth daily 30 tablet 3    ipratropium-albuterol (DUONEB) 0.5-2.5 (3) MG/3ML SOLN nebulizer solution Inhale 3 mLs into the lungs every 6 hours 360 mL     QUEtiapine (SEROQUEL) 25 MG tablet Take 1 tablet by mouth nightly 60 tablet 3    carvedilol (COREG) 6.25 MG tablet Take 1 tablet by mouth 2 times daily (with meals) 60 tablet 3    furosemide (LASIX) 20 MG tablet Take 1 tablet by mouth daily 60 tablet 3    clopidogrel (PLAVIX) 75 MG tablet Take 1 tablet by mouth daily 30 tablet 3    metFORMIN (GLUCOPHAGE-XR) 500 MG extended release tablet Take 1 tablet by mouth 3 times daily (with meals) 270 tablet 0    pantoprazole (PROTONIX) 40 MG tablet TAKE 1 TABLET BY MOUTH EVERY DAY BEFORE BREAKFAST 90 tablet 0    atorvastatin (LIPITOR) 40 MG tablet TAKE 1 TABLET BY MOUTH EVERY DAY 90 tablet 0    vitamin D (ERGOCALCIFEROL) 1.25 MG (17280 UT) CAPS capsule Take 1 capsule by mouth once a week for 12 doses (Patient taking differently: Take 50,000 Units by mouth once a week - Takes on Wednesday) 12 capsule 3    dapagliflozin (FARXIGA) 10 MG tablet TAKE 1 TABLET BY MOUTH EVERY DAY IN THE MORNING 90 tablet 1    tamsulosin (FLOMAX) 0.4 MG capsule TAKE 1 CAPSULE BY MOUTH EVERYDAY AT BEDTIME  3    aspirin 81 MG EC tablet Take 81 mg by mouth nightly      Coenzyme Q10 (CO Q-10) 100 MG CAPS Take 100 mg by mouth nightly      Omega-3 Fatty Acids (FISH OIL) 300 MG CAPS Take 1 capsule by mouth Daily with supper      vitamin B-12 (CYANOCOBALAMIN) 1000 MCG tablet Take 1,000 mcg by mouth daily      Ascorbic Acid (VITAMIN C) 500 MG tablet Take 500 mg by mouth daily      latanoprost (XALATAN) 0.005 % ophthalmic solution Place 1 drop into the right eye nightly   4     No current facility-administered medications for this visit.   seen with resident  I have reviewed and agree with care planning      Kenna Emanuel,

## 2021-07-20 NOTE — CARE COORDINATION
[facility] Transitions Initial Follow Up Call    Call within 2 business days of discharge: Yes    Patient: Kar Omer Patient : 1940   MRN: <[id number]>  Reason for Admission: NSTEMI  Discharge Date: 7/3/21 RARS: Readmission Risk Score: 28      Last Discharge [facility]       Complaint Diagnosis Description Type Department Provider    21 Chest Pain NSTEMI (non-ST elevated myocardial infarction) [facility]) ED to Hosp-Admission (Discharged) (ADMITTED) Fina Wood MD; Elías Joy MD      Attempted to contact patient for initial BPCI-A call. Unable to reach patient. Caller left a hipaa compliant message with contact information for a return call. Will handoff to BP-A to follow. Non-face-to-face services provided:      Care Transitions 24 Hour Call    Care Transitions Interventions    Registered Dietician: Declined           Follow Up  No future appointments.     Jocelyn Orr RN

## 2021-07-23 NOTE — OP NOTE
Cardiovascular Lab Procedure Report    Dorota HAYSJIHCINDI  1940    Date : 6/25/21  Surgeon: Desmond Velazquez M.D. Pre-procedure Diagnosis: NSTEMI  Post-procedure Diagnosis: Same  Procedure:      Right femoral artery closure with 8 fr angioseal              US guided access of the left common femoral artery   Preclose proglide closure and subsequent closure   Anesthesia: Local with IV sedation  Assistants: Cath Lab Staff  Estimated Blood Loss: less than 50   Complications: hematoma    Procedure Details :  Intraoperative consult request by Dr. Quiroga Files. He had already obtained right common femoral artery access. Plan was for PCI and request was for preclosure using proglides at [address]. The Left common femoral artery was identified under US, noted to be patent and percutaneously accessed with micropuncture needle. Wire and than sheath placed. Image documented. 6 fr sheath than place and than removed. Proglides placed at 10 and 2 oclock position and predeployed. Rubber shods to sutures. 6 fr sheath replaced. At this point I left the room    Patient stated to develop small left groin hematoma. He also started to developed significant respiratory distress. I was called back to the room. Upon arrival I upsized the sheath to an 8 fr sheath on the left and there was no further hematoma. At this point Dr. Quiroga Files did not feel considering patients respiratory issues that he should proceed further with intervention. I preformed a closure of the left common femoral artery after imaging left common femoral artery using knot pushers. Good hemostasis achieved. As he did not want to proceed further I imaged the right common femoral artery. I than obtained a angioseal device and used it to close the right common femoral artery. No hematoma noted. Pt continued to have respiratory distress. He was tx back to the ICU for further treatment and management.       Please see Dr. Lester Bateman notes for further details.       Cyndee Mendez MD

## 2021-07-31 PROBLEM — I50.9 ACUTE DECOMPENSATED HEART FAILURE (HCC): Status: ACTIVE | Noted: 2021-01-01

## 2021-07-31 NOTE — PROGRESS NOTES
Pharmacy Note    Mary Arvizu was ordered Co Q-10 and Fish Oil capsules. As per the [address] [place] [address], herbals and certain dietary supplements will be discontinued.   The herbal or dietary supplement may be continued after discharge from the hospital.    Law Parry, PharmD  07/31/21 3:41 PM

## 2021-07-31 NOTE — ED PROVIDER NOTES
ATTENDING PROVIDER ATTESTATION:     Zoë Toney presented to the emergency department for evaluation of Shortness of Breath   and was initially evaluated by the Medical Resident. See Original ED Note for H&P and ED course above. I have reviewed and discussed the case, including pertinent history (medical, surgical, family and social) and exam findings with the Medical Resident assigned to Zoë Toney. I have personally performed and/or participated in the history, exam, medical decision making, and procedures and agree with all pertinent clinical information and any additional changes or corrections are noted below in my assessment and plan. I have discussed this patient in detail with the resident, and provided the instruction and education,    I have reviewed my findings and recommendations with the assigned Medical Resident, Zoë Toney and members of family present at the time of disposition. I have performed a history and physical examination of this patient and directly supervised the resident caring for this patient. History of Present Illness: This patient presents to the ED for Shortness of breath. Patient states that he was recently hospitalized and they attempted stent placement but they were unable to do so for an NSTEMI secondary to his inability to lie flat. The patient endorses paroxysmal nocturnal dyspnea as well as orthopnea and bilateral lower extremity edema. The patient endorses no chest pain at this time but only intermittent shortness of breath. Denies any overt chest pain at present. He has been completely compliant with his Lasix 20 mg that he was prescribed. He was discharged and stated a short course of nursing home with his staying at home.      Review of Systems:   A complete review of systems was performed and pertinent positives and negatives are stated within HPI, all other systems reviewed and are negative.    --------------------------------------------- PAST HISTORY ---------------------------------------------  Past Medical History:  has a past medical history of Arthritis, BPH (benign prostatic hypertrophy), CAD (coronary artery disease), Cervical spondylosis, Constipation, CTS (carpal tunnel syndrome), Diabetes mellitus ([facility] [address] DM type 2 (diabetes mellitus, type 2) ([facility] Exogenous obesity, Glaucoma, History of EKG, Hypercholesterolemia, Hypertension, OA (osteoarthritis), Partial blindness, Polyp of colon, PONV (postoperative nausea and vomiting), Preoperative clearance, Vitamin B12 deficiency, Vitamin D deficiency, and Wears glasses. Past Surgical History:  has a past surgical history that includes Diagnostic Cardiac Cath Lab Procedure (1990 approximately); eye surgery (Bilateral); Total shoulder arthroplasty (Left, 2000); Cataract removal with implant (Bilateral); Total knee arthroplasty (Right, 11/30/2015); Colonoscopy (10/31/2013); joint replacement (Right, 12/08/2015); and Cardiac catheterization (06/22/2021). Social History:  reports that he quit smoking about 40 years ago. His smoking use included cigars. He started smoking about 67 years ago. He has a 120.00 pack-year smoking history. He quit smokeless tobacco use about 5 years ago. He reports that he does not drink alcohol and does not use drugs. Family History: family history includes Heart Disease in his mother; Heart Disease (age of onset: 52) in his father. Unless otherwise noted, family history is non contributory    The patients home medications have been reviewed. Allergies: Ace inhibitors;  Anesthetics, amide; and Vicodin [hydrocodone-acetaminophen]    Physical Exam:  Constitutional: [Appears in no distress]  Head: [Normocephalic, atraumatic]   Eyes: [Non-icteric slcera, no conjunctival injection]  ENT: [Moist mucous membranes,]  Neck: [Trachea midline, no JVD]  Respiratory: Bibasilar crackles noted on exam  Cardiovascular: [Regular rate. Regular rhythm. No murmurs, no gallops, no rubs.]   Gastrointestinal:  [Abdomen Soft, Non tender, Non distended. No rebound tenderness, guarding, or rigidity.]  Extremities: 3+ lower extremity pitting edema to the knee  Genitourinary: [No CVA tenderness, no suprapubic tenderness]  Musculoskeletal: [Moves all extremities, no deformity]  Skin: [Pink, warm, dry without rash.]  Neurologic: [Alert, symmetric facies, no aphasia]    My Medical Decision Making:   Patient presents emergency department shortness of breath. Vital signs interpreted by me is within normal limits. History and physical examination findings are consistent with multiple differential diagnoses are considered including heart failure, ACS, infectious etiologies. Will obtain ACS work-up. Patient is not hypoxic and saturating well on room air. We will give the patient IV dose of Lasix here as I do presume this is most likely related to patient's heart failure and underlying poor cardiovascular status. Did perform chart review it appears that the patient has a ejection fraction of 4045% with inferior akinesis. There is diastolic dysfunction noted as well. Date of EKG: July 31, 2021  Time: 10:45 AM    Rhythm: Normal sinus rhythm  Rate: 63 bpm  Axis: Normal axis  Conduction: QRS duration is prolonged at 142 ms  ST Segments: There is RR prime pattern consistent with right bundle branch block  T Waves: T wave inversions noted in lead III and aVF    Clinical Impression: Normal sinus rhythm right bundle branch block pattern unchanged from prior EKG. Comparison to prior EKG: Compared to EKG tracing from June 21, 2021 and unchanged    Labs: Laboratory studies demonstrate elevated creatinine 1.3 this is at the patient's normal baseline. He has an elevated BNP at 15,203 as well as elevated troponin at 83. Repeat is pending at this time.   He has a very mild transaminitis with regards to his ALT is alkaline phosphatase is slightly elevated at 215 this appears to be trending up from previous. CBC is remarkable for a macrocytic anemia at 6.3 which is below the patient's normal baseline. Given the patient anemia who provided blood transfusion as well as dose of Lasix given his underlying heart failure. Because of the patient's volume overload I feel she should be admitted with IV diuresis as well as for blood transfusion. Patient is agreeable to this. Critical Care:  Upon my evaluation, this patient had a high probability of imminent or life-threatening deterioration due to anemia, lower extremity edema, heart failure, which required my direct attention, intervention, and personal management. I have personally provided 32 minutes of critical care time excluding time spent on separately billable procedures. Time includes review of laboratory data, radiology results, discussion with consultants, and monitoring for potential decompensation. Interventions were performed as documented above. IMPRESSION:   1. Acute on chronic congestive heart failure, unspecified heart failure type ([address]    2. Anemia requiring transfusions    3. Shortness of breath    4. Lower extremity edema        I, Dr. Malu Dumont, am the primary provider of record. I directly supervised any procedures performed by the resident and was present for the procedure including all critical portions of the procedure. Malu Dumont DO  Emergency Medicine    NOTE: This report was transcribed using voice recognition software.  Every effort was made to ensure accuracy; however, inadvertent computerized transcription errors may be present       Hanna Power DO  07/31/21 2051

## 2021-07-31 NOTE — PROGRESS NOTES
Shaun Pollock was ordered dapagliflozin Adrienne Amor) which is a nonformulary medication. Nurse is going to check with patient to see if home supply of this medication will be brought in to the hospital for inpatient use. A pharmacist will follow-up with the nurse of the patient to assess the capability of the patient to bring in the medication. If it is determined that the patient cannot supply the medication and it is not available to be dispensed from the pharmacy, a call will be placed to the ordering provider to discuss alternative options.      Santo Angelucci, PharmD  07/31/21 3:39 PM

## 2021-07-31 NOTE — ED PROVIDER NOTES
CHEPE Olivarez is a 78 y.o. male with a PMHx significant for hypertension, hyperlipidemia, CAD, NSTEMI, CHF, type 2 diabetes mellitus and BPH who presents with several days of ongoing shortness of breath. The patient's complaint is persistent, moderate in severity and worsened by any activity. The patient states that he was recently admitted to the hospital when he came in for chest pain and shortness of breath. At that time he thinks he was diagnosed with a heart attack. He notes that he was taken to the Cath Lab, but was unable to tolerate the procedure so it was not completed. He states that since that time he has continued to be intermittently short of breath until the last few days what is become constant. He does endorse orthopnea and states that his shortness of breath has been bad enough that he is having difficulty functioning at home. The patient denies recent trauma, fever, chills, fatigue, HA, dizziness, vision changes, congestion, rhinorrhea, neck pain, chest pain, palpitations, hx of MI, hx of blood clots, cough, wheezing, abdominal pain, N/V/D/C, hematochezia, melena, dysuria, hematuria, generalized weakness and paresthesias. The patient is currently taking Plavix. Tobacco Hx:   reports that he quit smoking about 40 years ago. His smoking use included cigars. He started smoking about 67 years ago. He has a 120.00 pack-year smoking history. He quit smokeless tobacco use about 5 years ago. Alcohol Hx:   reports no history of alcohol use. Illicit Drug Hx:  Reports no history of illicit drug use. The history is provided by the patient. Last Tetanus (if applicable): N/A    Review of Systems   Constitutional: Positive for fatigue. Negative for chills, diaphoresis and fever. HENT: Negative for congestion, rhinorrhea and sore throat. Eyes: Negative for pain and redness. Respiratory: Positive for shortness of breath. Negative for cough and wheezing. Cardiovascular: Positive for leg swelling (b/l). Negative for chest pain and palpitations. Gastrointestinal: Negative for abdominal distention, abdominal pain, blood in stool, constipation, diarrhea, nausea and vomiting. Genitourinary: Negative for difficulty urinating, dysuria, flank pain, frequency and hematuria. Musculoskeletal: Negative for arthralgias, back pain, myalgias and neck pain. Skin: Negative for rash and wound. Neurological: Negative for dizziness, syncope, speech difficulty, weakness, light-headedness, numbness and headaches. Physical Exam  Vitals and nursing note reviewed. Constitutional:       General: He is awake. He is in acute distress (Mild secondary shortness of breath). Appearance: He is well-developed. He is not diaphoretic. HENT:      Head: Normocephalic and atraumatic. Right Ear: External ear normal.      Left Ear: External ear normal.      Nose: Nose normal. No congestion or rhinorrhea. Mouth/Throat:      Mouth: Mucous membranes are moist.      Pharynx: Oropharynx is clear. Eyes:      General: No scleral icterus. Right eye: No discharge. Left eye: No discharge. Extraocular Movements: Extraocular movements intact. Conjunctiva/sclera: Conjunctivae normal.   Cardiovascular:      Rate and Rhythm: Normal rate and regular rhythm. Heart sounds: Normal heart sounds. No murmur heard. No friction rub. No gallop. Comments: Upper extremity and lower extremity distal pulses intact bilaterally +2/4  Pulmonary:      Effort: Pulmonary effort is normal. No respiratory distress. Breath sounds: Rales present. No wheezing or rhonchi. Comments: Decreased air movement noted throughout. Significant bibasilar crackles appreciated on exam.  Chest:      Chest wall: No tenderness. Abdominal:      General: Bowel sounds are normal. There is no distension. Palpations: Abdomen is soft. Tenderness:  There is no abdominal tenderness. There is no guarding or rebound. Musculoskeletal:         General: No tenderness or deformity. Normal range of motion. Cervical back: Normal range of motion and neck supple. No rigidity. No muscular tenderness. Right lower leg: Edema (1+ pitting) present. Left lower leg: Edema (1+ pitting) present. Lymphadenopathy:      Cervical: No cervical adenopathy. Skin:     General: Skin is warm and dry. Capillary Refill: Capillary refill takes less than 2 seconds. Findings: No erythema or rash. Neurological:      General: No focal deficit present. Mental Status: He is alert and oriented to person, place, and time. Cranial Nerves: No cranial nerve deficit. Sensory: No sensory deficit. Motor: No weakness. Coordination: Coordination normal.          --------------------------------------------- PAST HISTORY ---------------------------------------------  Past Medical History:  has a past medical history of Arthritis, BPH (benign prostatic hypertrophy), CAD (coronary artery disease), Cervical spondylosis, Constipation, CTS (carpal tunnel syndrome), Diabetes mellitus ([facility] [address] DM type 2 (diabetes mellitus, type 2) ([facility] [address] Exogenous obesity, Glaucoma, History of EKG, Hypercholesterolemia, Hypertension, OA (osteoarthritis), Partial blindness, Polyp of colon, PONV (postoperative nausea and vomiting), Preoperative clearance, Vitamin B12 deficiency, Vitamin D deficiency, and Wears glasses. Past Surgical History:  has a past surgical history that includes Diagnostic Cardiac Cath Lab Procedure (1990 approximately); eye surgery (Bilateral); Total shoulder arthroplasty (Left, 2000); Cataract removal with implant (Bilateral); Total knee arthroplasty (Right, 11/30/2015); Colonoscopy (10/31/2013); joint replacement (Right, 12/08/2015); and Cardiac catheterization (06/22/2021). Social History:  reports that he quit smoking about 40 years ago.  His smoking use included cigars. He started smoking about 67 years ago. He has a 120.00 pack-year smoking history. He quit smokeless tobacco use about 5 years ago. He reports that he does not drink alcohol and does not use drugs. Family History: family history includes Heart Disease in his mother; Heart Disease (age of onset: 52) in his father. Home Meds: Not in a hospital admission. The patients home medications have been reviewed. Allergies: Ace inhibitors; Anesthetics, amide; and Vicodin [hydrocodone-acetaminophen]    ------------------------- NURSING NOTES AND VITALS REVIEWED ---------------------------  Date / Time Roomed:  7/31/2021 10:41 AM  ED Bed Assignment:  23/23    The nursing notes within the ED encounter and vital signs as below have been reviewed. BP (!) 113/53   Pulse 70   Temp 97.5 °F (36.4 °C) (Infrared)   Resp 20   Ht 5' 9\" (1.753 m)   Wt 231 lb 9.6 oz (105.1 kg)   SpO2 100%   BMI 34.20 kg/m²   -------------------------------------------------- RESULTS / INTERVENTIONS -------------------------------------------------  All laboratory and radiology tests have been reviewed by this physician.     LABS:  Results for orders placed or performed during the hospital encounter of 07/31/21   CBC Auto Differential   Result Value Ref Range    WBC 4.4 (L) 4.5 - 11.5 E9/L    RBC 1.76 (L) 3.80 - 5.80 E12/L    Hemoglobin 6.3 (LL) 12.5 - 16.5 g/dL    Hematocrit 19.7 (L) 37.0 - 54.0 %    .9 (H) 80.0 - 99.9 fL    MCH 35.8 (H) 26.0 - 35.0 pg    MCHC 32.0 32.0 - 34.5 %    RDW 21.6 (H) 11.5 - 15.0 fL    Platelets 792 933 - 087 E9/L    MPV 10.9 7.0 - 12.0 fL    Neutrophils Absolute 3.08 1.80 - 7.30 E9/L    Myelocyte Percent 0.8 0 - 0 %    nRBC 1.7 /100 WBC    Anisocytosis 1+     Polychromasia 3+     Poikilocytes 2+     Ovalocytes 2+     Spherocytes 1+     Basophilic Stippling 1+    Comprehensive Metabolic Panel w/ Reflex to MG   Result Value Ref Range    Sodium 134 132 - 146 mmol/L    Potassium reflex Magnesium 3.8 3.5 - 5.0 mmol/L    Chloride 94 (L) 98 - 107 mmol/L    CO2 27 22 - 29 mmol/L    Anion Gap 13 7 - 16 mmol/L    Glucose 149 (H) 74 - 99 mg/dL    BUN 21 6 - 23 mg/dL    CREATININE 1.3 (H) 0.7 - 1.2 mg/dL    GFR Non-African American 53 >=60 mL/min/1.73    GFR African American >60     Calcium 8.4 (L) 8.6 - 10.2 mg/dL    Total Protein 6.3 (L) 6.4 - 8.3 g/dL    Albumin 3.5 3.5 - 5.2 g/dL    Total Bilirubin 1.0 0.0 - 1.2 mg/dL    Alkaline Phosphatase 215 (H) 40 - 129 U/L    ALT 60 (H) 0 - 40 U/L    AST 18 0 - 39 U/L   Troponin   Result Value Ref Range    Troponin, High Sensitivity 83 (H) 0 - 11 ng/L   Brain Natriuretic Peptide   Result Value Ref Range    Pro-BNP 15,203 (H) 0 - 450 pg/mL       RADIOLOGY: Interpreted by Radiologist unless otherwise noted. XR CHEST PORTABLE   Final Result   Patchy bibasilar airspace disease      The heart is at upper limits of normal in size. Oxygen Saturation Interpretation: Normal    Meds Given:  Medications   0.9 % sodium chloride infusion (has no administration in time range)   aspirin chewable tablet 324 mg (324 mg Oral Given 7/31/21 1124)   furosemide (LASIX) injection 40 mg (40 mg Intravenous Given 7/31/21 1137)       Procedures:  No procedures performed. --------------------------------- PROGRESS NOTES / ADDITIONAL PROVIDER NOTES ---------------------------------  Consultations:  As outlined below. ED Course:  ED Course as of Jul 31 1229   Sat Jul 31, 2021   1154 Discussed the case with Dr. Marilynn Al of internal medicine. He agrees to admit the patient for further evaluation and management. [ML]      ED Course User Index  [ML] Magdalena Lrasen, DO     1226: All results were discussed with the patient and I have provided specific details regarding the plan to admit the patient. The patient was stable at the time of admission and was without objective evidence of hemodynamic instability.  The patient was seen in the emergency department by myself and the assigned attending physician, Dr. Josh Parish, who agreed with the assessment, plan and decision to admit as laid out herein. The patient verbalized an understanding and agreement with the plan for admission. All questions were answered and the patient was deemed to be in stable condition at the time of transport. MDM:  Patient presented from home complaining of several days of worsening shortness of breath. On arrival, all vital signs were within normal limits. EKG and physical exam were as documented above. Work-up was initiated to further evaluate the patient's presenting complaints. Labs were remarkable for a significantly elevated proBNP and elevated troponin, but the troponin was significantly improved over the patient's values from 1 month prior. Creatinine was 1.3, but that value approximated the patient's most recent baseline value. The patient's hemoglobin was 6.3 and 1 unit of PRBCs was ordered. Chest x-ray revealed signs of fluid overload and the patient was also given 40 mg of IV Lasix. Given his history, presentation and findings on work-up, the decision was made to admit him to the hospital for further evaluation and management. The case was discussed with internal medicine who agreed to admit. The patient was stable at the time of his disposition. This patient's ED course included: a personal history and physicial examination, re-evaluation prior to disposition, multiple bedside re-evaluations, IV medications, cardiac monitoring and continuous pulse oximetry    Diagnosis:  1. Acute on chronic congestive heart failure, unspecified heart failure type ([address]        Disposition:  Patient's disposition: Admit to telemetry  Patient's condition is stable. This patient was seen, examined and treated with Dr. Josh Parish. All pertinent aspects of the patient's care were discussed with the attending physician.        Glorious Snellen, DO  Resident  07/31/21 9308

## 2021-08-01 NOTE — H&P
Antonio Tee MD FACP  Internal medicine   History and Physical      CHIEF COMPLAINT: Shortness of breath      HISTORY OF PRESENT ILLNESS:    Patient was seen on the floor earlier this morning  Data reviewed in detail  Spoke with the ER physician at the time of admission  Son at bedside  49-year-old man with recent non-ST elevation myocardial infarction  Decided to treat medically due to his mental status and morbid obesity  He required transfusions and iron infusion on last admission  Presents back to emergency room with feeling of \"wiped out after a shower associated with shortness of breath and leg swelling  He denied chest pain  Troponin is much better compared to his last admission troponin  Responded well to blood transfusion/no active bleeding reported  And IV Lasix  He feels much better this morning    Past Medical History:    Past Medical History:   Diagnosis Date    Arthritis     bilateral knee    BPH (benign prostatic hypertrophy) 8/4/2016    CAD (coronary artery disease)     Cervical spondylosis 8/4/2016    Constipation     prune juice in am    CTS (carpal tunnel syndrome) 8/4/2016    Diabetes mellitus ([facility] [address]     DM type 2 (diabetes mellitus, type 2) ([facility] [address] 8/4/2016    Exogenous obesity 8/4/2016    Glaucoma     bilateral    History of EKG 10/14/2015 per Dr Chinedu Kasier on chart.     Hypercholesterolemia     Hypertension     OA (osteoarthritis) 8/4/2016    Partial blindness     left eye    Polyp of colon 8/4/2016    PONV (postoperative nausea and vomiting)     Preoperative clearance 11/12/2015    per Dr Chinedu Kaiser on chart; for right tjak Dr Rosalind Murphy Vitamin B12 deficiency 8/4/2016    Vitamin D deficiency 8/4/2016    Wears glasses        Past Surgical History:    Past Surgical History:   Procedure Laterality Date    CARDIAC CATHETERIZATION  06/22/2021    [place]    CATARACT REMOVAL WITH IMPLANT Bilateral     COLONOSCOPY  10/31/2013    DIAGNOSTIC CARDIAC CATH LAB PROCEDURE  1990 approximately ?  stent    EYE SURGERY Bilateral     cataract    JOINT REPLACEMENT Right 12/08/2015    SHOULDER ARTHROPLASTY Left 2000    TOTAL KNEE ARTHROPLASTY Right 11/30/2015       Medications Prior to Admission:    Medications Prior to Admission: atorvastatin (LIPITOR) 40 MG tablet, TAKE 1 TABLET BY MOUTH EVERY DAY  isosorbide mononitrate (IMDUR) 60 MG extended release tablet, Take 1 tablet by mouth daily  carvedilol (COREG) 6.25 MG tablet, Take 1 tablet by mouth 2 times daily (with meals)  furosemide (LASIX) 20 MG tablet, Take 1 tablet by mouth daily  clopidogrel (PLAVIX) 75 MG tablet, Take 1 tablet by mouth daily  Ascorbic Acid (VITAMIN C) 500 MG CAPS, Take 500 mg by mouth daily  dapagliflozin (FARXIGA) 10 MG tablet, TAKE 1 TABLET BY MOUTH EVERY DAY IN THE MORNING  ferrous sulfate (IRON 325) 325 (65 Fe) MG tablet, Take 1 tablet by mouth daily (with breakfast)  metFORMIN (GLUCOPHAGE-XR) 500 MG extended release tablet, Take 1 tablet by mouth 3 times daily (with meals)  pantoprazole (PROTONIX) 40 MG tablet, TAKE 1 TABLET BY MOUTH EVERY DAY BEFORE BREAKFAST  aspirin 81 MG EC tablet, Take 81 mg by mouth nightly  Omega-3 Fatty Acids (FISH OIL) 300 MG CAPS, Take 1 capsule by mouth Daily with supper  vitamin B-12 (CYANOCOBALAMIN) 1000 MCG tablet, Take 1,000 mcg by mouth daily  ipratropium-albuterol (DUONEB) 0.5-2.5 (3) MG/3ML SOLN nebulizer solution, Inhale 3 mLs into the lungs every 6 hours  magnesium hydroxide (MILK OF MAGNESIA) 400 MG/5ML suspension, Take 30 mLs by mouth daily as needed for Constipation  acetaminophen (TYLENOL) 325 MG suppository, Place 325 mg rectally every 4 hours as needed for Fever  bisacodyl (BISAC-EVAC) 10 MG suppository, Place 1 suppository rectally as needed for Constipation  tamsulosin (FLOMAX) 0.4 MG capsule, TAKE 1 CAPSULE BY MOUTH EVERYDAY AT BEDTIME  pantoprazole (PROTONIX) 40 MG tablet, Take 1 tablet by mouth every morning (before breakfast)  glucose monitoring (FREESTYLE) kit, 1 kit by Does not apply route daily  FreeStyle Lancets MISC, 1 each by Does not apply route 2 times daily E11.9  blood glucose test strips (FREESTYLE TEST STRIPS) strip, Indications: freestyle test strips DX: E11.9 QD    Diagnosis is E 11.9  vitamin D (ERGOCALCIFEROL) 1.25 MG (94063 UT) CAPS capsule, Take 1 capsule by mouth once a week for 12 doses (Patient taking differently: Take 50,000 Units by mouth once a week - Takes on Wednesday)  Coenzyme Q10 (CO Q-10) 100 MG CAPS, Take 100 mg by mouth nightly  latanoprost (XALATAN) 0.005 % ophthalmic solution, Place 1 drop into the right eye nightly     Allergies:    Ace inhibitors; Anesthetics, amide; and Vicodin [hydrocodone-acetaminophen]    Social History:    reports that he quit smoking about 40 years ago. His smoking use included cigars. He started smoking about 67 years ago. He has a 120.00 pack-year smoking history. He quit smokeless tobacco use about 5 years ago. He reports that he does not drink alcohol and does not use drugs. Family History:   family history includes Heart Disease in his mother; Heart Disease (age of onset: 52) in his father. REVIEW OF SYSTEMS:  As above in the HPI, otherwise negative    PHYSICAL EXAM:    Vitals:  BP (!) 105/57   Pulse 67   Temp 97.6 °F (36.4 °C) (Oral)   Resp 18   Ht 5' 9\" (1.753 m)   Wt 231 lb 9.6 oz (105.1 kg)   SpO2 95%   BMI 34.20 kg/m²     General:  Awake, alert, oriented X 3. Well developed, well nourished, well groomed. No apparent distress. Morbidly obese  Looks pale  HEENT:  Normocephalic, atraumatic. Pupils equal, round, reactive to light. No scleral icterus. No conjunctival injection. .  No nasal discharge. Neck:  Supple  Heart:  RRR, no murmurs, gallops, rubs  Lungs: Scattered rails  Abdomen:   Bowel sounds present, soft, nontender, no masses, no organomegaly, no peritoneal signs  Extremities:  No clubbing, cyanosis,   Bilateral lower extremity edema 2+ noted  Skin:  Warm and dry, no open lesions or rash  Neuro: Cranial nerves 2-12 intact, no focal deficits  Mental status back at his baseline  Breast: deferred  Rectal: deferred  Genitalia:  deferred    LABS: Data reviewed      ASSESSMENT:      Active Problems:    Acute decompensated heart failure ([facility] [address]  Complicated by anemia  Recent hospitalization for acute non-ST elevation MI/viral pneumonitis/delirium  Anemia more than likely from chronic disease i.e. chronic kidney disease  Cell indicis not reflective of iron deficiency  May benefit from erythropoietin injections  Otherwise continue current medical management  Replace potassium  Hematology consult  Reviewed in detail with son at bedside      PLAN:  As above  Questions answered to their Jena Call MD  9:59 AM  8/1/2021

## 2021-08-01 NOTE — CONSULTS
[facility]  Hematology/Oncology  Consult      Patient Name: Jamil Olmedo  YOB: 1940  PCP: Dimitri Bailey MD   Referring Provider:      Reason for Consultation:   Chief Complaint   Patient presents with    Shortness of Breath        History of Present Illness: This is an 64-year-old male patient with a PMH of CKD, HTN, BPH, CAD, and DM who had a recent hospitalization for a non-STEMI requiring blood and iron transfusions for anemia. Patient presented to the emergency room for evaluation of shortness of breath and leg swelling. Chest x-ray showed patchy bibasilar airspace disease. The heart is in upper limits of normal size. Patient admitted for acute on chronic decompensated heart failure management. CMP GFR 53 due to CKD. CBC with macrocytic anemia. Hgb 6.3 on admission s/p 1 unit of blood now 7.1. Consultation for further evaluation of anemia. Diagnostic Data:     Past Medical History:   Diagnosis Date    Arthritis     bilateral knee    BPH (benign prostatic hypertrophy) 8/4/2016    CAD (coronary artery disease)     Cervical spondylosis 8/4/2016    Constipation     prune juice in am    CTS (carpal tunnel syndrome) 8/4/2016    Diabetes mellitus ([address]     DM type 2 (diabetes mellitus, type 2) ([address] 8/4/2016    Exogenous obesity 8/4/2016    Glaucoma     bilateral    History of EKG 10/14/2015 per Dr Tanya Mijares on chart.     Hypercholesterolemia     Hypertension     OA (osteoarthritis) 8/4/2016    Partial blindness     left eye    Polyp of colon 8/4/2016    PONV (postoperative nausea and vomiting)     Preoperative clearance 11/12/2015    per Dr Tanya Mijares on chart; for right tjak Dr Beth Ojeda Vitamin B12 deficiency 8/4/2016    Vitamin D deficiency 8/4/2016    Wears glasses        Patient Active Problem List    Diagnosis Date Noted    Acute decompensated heart failure ([address] 07/31/2021    NSTEMI (non-ST elevated myocardial infarction) ([address] 06/21/2021    Generalized weakness 06/15/2021    Chest pain 2020    Morbidly obese ([facility] 2020    Asthmatic bronchitis, mild intermittent, uncomplicated     Benign prostatic hyperplasia 2016    CTS (carpal tunnel syndrome) 2016    Cervical spondylosis 2016    DM type 2 (diabetes mellitus, type 2) ([facility] 2016    Exogenous obesity 2016    OA (osteoarthritis) 2016    Polyp of colon 2016    Vitamin B12 deficiency 2016    Vitamin D deficiency 2016    Hypertension     Hypercholesterolemia     CAD (coronary artery disease)         Past Surgical History:   Procedure Laterality Date    CARDIAC CATHETERIZATION  2021    [place]    CATARACT REMOVAL WITH IMPLANT Bilateral     COLONOSCOPY  10/31/2013    DIAGNOSTIC CARDIAC CATH LAB PROCEDURE   approximately    ? stent    EYE SURGERY Bilateral     cataract    JOINT REPLACEMENT Right 2015    SHOULDER ARTHROPLASTY Left 2000    TOTAL KNEE ARTHROPLASTY Right 2015       Family History  Family History   Problem Relation Age of Onset    Heart Disease Mother         AFIB    Heart Disease Father 52         MI       Social History    TOBACCO:   reports that he quit smoking about 40 years ago. His smoking use included cigars. He started smoking about 67 years ago. He has a 120.00 pack-year smoking history. He quit smokeless tobacco use about 5 years ago. ETOH:   reports no history of alcohol use. Home Medications  Prior to Admission medications    Medication Sig Start Date End Date Taking?  Authorizing Provider   atorvastatin (LIPITOR) 40 MG tablet TAKE 1 TABLET BY MOUTH EVERY DAY 21  Yes Lovely Welch MD   isosorbide mononitrate (IMDUR) 60 MG extended release tablet Take 1 tablet by mouth daily 21  Yes Lovely Welch MD   carvedilol (COREG) 6.25 MG tablet Take 1 tablet by mouth 2 times daily (with meals) 21  Yes Lovely Welch MD   furosemide (LASIX) 20 MG Woody Garzon MD   FreeStyle Lancets [facility] 1 each by Does not apply route 2 times daily E11.9 7/27/21   Woody Garzon MD   blood glucose test strips (FREESTYLE TEST STRIPS) strip Indications: freestyle test strips DX: E11.9 QD    Diagnosis is E 11.9 7/23/21   Woody Garzon MD   vitamin D (ERGOCALCIFEROL) 1.25 MG (34337 UT) CAPS capsule Take 1 capsule by mouth once a week for 12 doses  Patient taking differently: Take 50,000 Units by mouth once a week - Takes on Wednesday 12/7/20 6/15/21  Woody Garzon MD   Coenzyme Q10 (CO Q-10) 100 MG CAPS Take 100 mg by mouth nightly    Historical Provider, MD   latanoprost (XALATAN) 0.005 % ophthalmic solution Place 1 drop into the right eye nightly  9/23/15   Historical Provider, MD       Allergies  Allergies   Allergen Reactions    Ace Inhibitors      LIZZ    Anesthetics, Amide      Hospitalization after use.  Vicodin [Hydrocodone-Acetaminophen] Other (See Comments)     Abdominal pain constipation        Review of Systems:     Constitutional:  No fever chills or rigors.  Eyes: No changes in vision, discharge, or pain   ENT: No Headaches, hearing loss or vertigo. No mouth sores or sore throat. No change in taste or smell.  Cardiovascular: No chest discomfort, dyspnea on exertion, palpitations or loss of consciousness. or phlebitis.  Respiratory: Has no cough or wheezing, Has no sputum production. Has no hemoptysis, Has no pleuritic pain, .  Gastrointestinal: No abdominal pain, appetite loss, blood in stools. No change in bowel habits. No hematemesis    Genitourinary: Patient acknowledges no dysuria, trouble voiding, or hematuria. No nocturia or increased frequency.  Musculoskeletal: No gait disturbance, weakness or joint complaints.  Integumentary: No rash or pruritis.  Neurological: No headache, diplopia, change in muscle strength, numbness or tingling. No change in gait, balance, coordination, mood, affect, memory, mentation, behavior.     Psychiatric: No anxiety, or depression.  Endocrine: No temperature intolerance. No excessive thirst, fluid intake, or urination. No tremor.  Hematologic/Lymphatic: No abnormal bruising or bleeding, blood clots or swollen lymph nodes.  Allergic/Immunologic: No nasal congestion or hives. Objective  BP (!) 105/57   Pulse 67   Temp 97.6 °F (36.4 °C) (Oral)   Resp 18   Ht 5' 9\" (1.753 m)   Wt 231 lb 9.6 oz (105.1 kg)   SpO2 95%   BMI 34.20 kg/m²     Physical Exam:   Performance Status:  General: AAO to person, place, time, in no acute distress,   Head and neck : PERRLA, EOMI . Sclera non icteric. Oropharynx : Clear  Neck: no JVD,  no adenopathy  Heart: Regular rate and regular rhythm, no murmur  Lungs: Clear to auscultation   Extremities: No edema,no cyanosis, no clubbing. Abdomen: Soft, non-tender;no masses, no organomegaly  Skin:  No rash. Neurologic:Cranial nerves grossly intact. No focal motor or sensory deficits .     Recent Laboratory Data-   Lab Results   Component Value Date    WBC 3.5 (L) 08/01/2021    HGB 7.1 (L) 08/01/2021    HCT 21.7 (L) 08/01/2021    .3 (H) 08/01/2021     08/01/2021    LYMPHOPCT 32.5 08/01/2021    RBC 2.06 (L) 08/01/2021    MCH 34.5 08/01/2021    MCHC 32.7 08/01/2021    RDW 23.1 (H) 08/01/2021    NEUTOPHILPCT 60.7 08/01/2021    MONOPCT 6.8 08/01/2021    BASOPCT 0.3 08/01/2021    NEUTROABS 2.14 08/01/2021    LYMPHSABS 1.16 (L) 08/01/2021    MONOSABS 0.24 08/01/2021    EOSABS 0.00 (L) 08/01/2021    BASOSABS 0.00 08/01/2021       Lab Results   Component Value Date     08/01/2021    K 2.9 (L) 08/01/2021    CL 95 (L) 08/01/2021    CO2 29 08/01/2021    BUN 19 08/01/2021    CREATININE 1.3 (H) 08/01/2021    GLUCOSE 117 (H) 08/01/2021    CALCIUM 7.9 (L) 08/01/2021    PROT 5.6 (L) 08/01/2021    LABALBU 3.0 (L) 08/01/2021    BILITOT 1.1 08/01/2021    ALKPHOS 187 (H) 08/01/2021    AST 12 08/01/2021    ALT 45 (H) 08/01/2021    LABGLOM 53 08/01/2021    GFRAA >60 08/01/2021 No results found for: IRON, TIBC, FERRITIN        Radiology-    XR CHEST PORTABLE    Result Date: 7/31/2021  EXAMINATION: ONE XRAY VIEW OF THE CHEST 7/31/2021 11:34 am COMPARISON: 06/30/2021 and 07/02/2021 HISTORY: ORDERING SYSTEM PROVIDED HISTORY: sob TECHNOLOGIST PROVIDED HISTORY: Reason for exam:->sob What reading provider will be dictating this exam?->CRC FINDINGS: The cardiac silhouette is at the upper limits of normal. There is a smooth bordered granuloma seen within the right lower lobe which is chronic. There is a 1 cm nodule seen within the right hilum which was also present on the patient's prior study of 06/30/2021 is unchanged. This could represent a granuloma. Patchy airspace disease seen within the right and left lung base. Patchy bibasilar airspace disease The heart is at upper limits of normal in size. XR CHEST PORTABLE    Result Date: 7/2/2021  EXAMINATION: ONE XRAY VIEW OF THE CHEST 7/2/2021 12:43 pm COMPARISON: 06/30/2021 HISTORY: ORDERING SYSTEM PROVIDED HISTORY: pneumonia, CHF TECHNOLOGIST PROVIDED HISTORY: Reason for exam:->pneumonia, CHF What reading provider will be dictating this exam?->CRC FINDINGS: Prominent but stable cardiomediastinal silhouette. Prominent central vessels. Patchy left lung base atelectasis or infiltrate. No large effusion or pneumothorax. There has been prior left shoulder arthroplasty. Prominent but stable cardiomediastinal silhouette. There is prominence of the central vessels, with overall improved aeration of the lungs compared to the prior study. ASSESSMENT/PLAN :  79 yo male  -CKD, HTN, BPH, CAD, and DM  admitted for acute on chronic decompensated heart failure management. We have been consulted for anemia. Anemia: Hemoglobin was normal up until December 2020. It has ranged from 7-10 for the past couple of months. MCV is elevated. Denies blood in stools or melanotic stools.   Will check B12 folate LDH reticulocyte iron panel, FOBT.  Transfuse to keep hemoglobin above 7. He has grade 3 CKD. Patient has a poor performance status and is dependent for his activities of daily living. Would not benefit from extensive workup. We will continue to follow. Thank you for the consult. JOHN Vasquez - CNP  Electronically signed 8/1/2021 at 10:57 AM   Patient seen and examined. Agree with CNP's assessment and plan. Note updated.   Daly Howell MD

## 2021-08-02 NOTE — DISCHARGE SUMMARY
Physician Discharge Summary     Patient ID:  Luiza Luevano  [id number]  78 y.o.  1940    Admit date: 7/31/2021    Discharge date and time: 8/2/2021 11:11 AM     Admission Diagnoses: Acute decompensated heart failure ([facility] [I50.9]    Discharge Diagnoses:   Acute diastolic heart failure  Severe anemia  Chronically elevated troponin  Recent myocardial infarction  Hypokalemia  Patient Active Problem List   Diagnosis    Hypertension    Hypercholesterolemia    CAD (coronary artery disease)    Benign prostatic hyperplasia    CTS (carpal tunnel syndrome)    Cervical spondylosis    DM type 2 (diabetes mellitus, type 2) ([facility]    Exogenous obesity    OA (osteoarthritis)    Polyp of colon    Vitamin B12 deficiency    Vitamin D deficiency    Morbidly obese (HCC)    Asthmatic bronchitis, mild intermittent, uncomplicated    Chest pain    Generalized weakness    NSTEM[facility] (non-ST elevated [facility] infarction) [facility]    Acute decompensated heart failure ([facility]       Consults: Hematology    Procedures:     Hospital Course:   64-year-old man with recent myocardial infarction  On medical management only  Presented to emergency room with worsening shortness of breath and leg swelling  Denied chest pain  Troponins much improved from the recent visit  Responded well to IV diuretics potassium replacement and packed cell transfusion  Symptoms resolved  Discharged home in stable condition    Discharge Exam:  Patient was seen on the floor prior to discharge  Able to sleep flat in the bed  No need for oxygen supplements  Lungs were clear  Heart regular rate and rhythm  Edema was near resolution  Lab data reviewed    Disposition: Home  Stable at the time of discharge  Patient Instructions:   Discharge Medication List as of 8/2/2021 10:32 AM      START taking these medications    Details   potassium chloride (KLOR-CON M) 20 MEQ extended release tablet Take 1 tablet by mouth daily, Disp-60 tablet, R-3Normal CONTINUE these medications which have CHANGED    Details   furosemide (LASIX) 20 MG tablet Take 2 tablets by mouth daily, Disp-90 tablet, R-1Normal         CONTINUE these medications which have NOT CHANGED    Details   atorvastatin (LIPITOR) 40 MG tablet TAKE 1 TABLET BY MOUTH EVERY DAY, Disp-90 tablet, R-1Normal      isosorbide mononitrate (IMDUR) 60 MG extended release tablet Take 1 tablet by mouth daily, Disp-90 tablet, R-1Normal      carvedilol (COREG) 6.25 MG tablet Take 1 tablet by mouth 2 times daily (with meals), Disp-180 tablet, R-1Normal      clopidogrel (PLAVIX) 75 MG tablet Take 1 tablet by mouth daily, Disp-90 tablet, R-1Normal      Ascorbic Acid (VITAMIN C) 500 MG CAPS Take 500 mg by mouth daily, Disp-90 capsule, R-1Normal      dapagliflozin (FARXIGA) 10 MG tablet TAKE 1 TABLET BY MOUTH EVERY DAY IN THE MORNING, Disp-90 tablet, R-1Normal      ferrous sulfate (IRON 325) 325 (65 Fe) MG tablet Take 1 tablet by mouth daily (with breakfast), Disp-90 tablet, R-1Normal      metFORMIN (GLUCOPHAGE-XR) 500 MG extended release tablet Take 1 tablet by mouth 3 times daily (with meals), Disp-270 tablet, R-1Normal      !!  pantoprazole (PROTONIX) 40 MG tablet TAKE 1 TABLET BY MOUTH EVERY DAY BEFORE BREAKFAST, Disp-90 tablet, R-0Normal      aspirin 81 MG EC tablet Take 81 mg by mouth nightlyHistorical Med      Omega-3 Fatty Acids (FISH OIL) 300 MG CAPS Take 1 capsule by mouth Daily with supperHistorical Med      vitamin B-12 (CYANOCOBALAMIN) 1000 MCG tablet Take 1,000 mcg by mouth dailyHistorical Med      ipratropium-albuterol (DUONEB) 0.5-2.5 (3) MG/3ML SOLN nebulizer solution Inhale 3 mLs into the lungs every 6 hours, Disp-360 mL, R-0NO PRINT      magnesium hydroxide (MILK OF MAGNESIA) 400 MG/5ML suspension Take 30 mLs by mouth daily as needed for ConstipationHistorical Med      bisacodyl (BISAC-EVAC) 10 MG suppository Place 1 suppository rectally as needed for Constipation, Disp-28 suppository, R-2Normal tamsulosin (FLOMAX) 0.4 MG capsule TAKE 1 CAPSULE BY MOUTH EVERYDAY AT BEDTIME, Disp-90 capsule, R-1Normal      !! pantoprazole (PROTONIX) 40 MG tablet Take 1 tablet by mouth every morning (before breakfast), Disp-90 tablet, R-1Normal      glucose monitoring (FREESTYLE) kit DAILY Starting Tue 7/27/2021, Disp-1 kit, R-0, Normal      FreeStyle Lancets MISC 2 TIMES DAILY Starting Tue 7/27/2021, Disp-100 each, R-3, VoxhlhA19.9       blood glucose test strips (FREESTYLE TEST STRIPS) strip Indications: freestyle test strips DX: E11.9 QD    Diagnosis is E 11.9, Disp-100 each, R-2Normal      vitamin D (ERGOCALCIFEROL) 1.25 MG (89226 UT) CAPS capsule Take 1 capsule by mouth once a week for 12 doses, Disp-12 capsule, R-3PATIENT WOULD LIKE A REFILL. THANK YOUNormal      Coenzyme Q10 (CO Q-10) 100 MG CAPS Take 100 mg by mouth nightlyHistorical Med      latanoprost (XALATAN) 0.005 % ophthalmic solution Place 1 drop into the right eye nightly , R-4Historical Med       !! - Potential duplicate medications found. Please discuss with provider.       STOP taking these medications       acetaminophen (TYLENOL) 325 MG suppository Comments:   Reason for Stopping:             Activity: As tolerated  Diet: Diabetic    Follow-up with PCP    Note that over 40 minutes was spent in preparing discharge papers, discussing discharge with patient, medication review, etc.    Signed:  Emanuel Daigle MD  8/2/2021  3:55 PM

## 2021-08-02 NOTE — PROGRESS NOTES
The patient is assisted to dress. Discharge instructions were presented to patient's son. All questions answered. Patient's home medication given to son. Dr. Hammonds Milton through [address] [place] [address] re: mike. Ordered to d/c duoneb.

## 2021-08-03 NOTE — CARE COORDINATION
BPCI-A PROGRAM QUALIFYING  from previous admission     [address] Initial Follow Up Call    Call within 2 business days of discharge: Yes    Patient: Debora Olivarez Patient : 1940   MRN: [id number]  Reason for Admission: SOB, CHF   Discharge Date: 21 RARS: Readmission Risk Score: 27      Last Discharge [facility]       Complaint Diagnosis Description Type Department Provider    21 Shortness of Breath Acute on chronic congestive heart failure, unspecified heart failure type ([facility] . .. ED to Hosp-Admission (Discharged) (ADMITTED) [facility] P[facility] Yogesh Mckinnon MD; Mikaela Ahmadi . .. Spoke with: Julio's wife Lisa     Facility: [facility] Emergency Department services provided:  Obtained and reviewed discharge summary and/or continuity of care documents    Care Transitions 24 Hour Call    Do you have any ongoing symptoms?: No  Do you have a copy of your discharge instructions?: Yes  Do you have all of your prescriptions and are they filled?: Yes  Have you been contacted by a Estefania Natarajan Pharmacist?: No  Have you scheduled your follow up appointment?: Yes  How are you going to get to your appointment?: Car - family or friend to transport  Were you discharged with any Home Care or Post Acute Services: Yes  Post Acute Services: Home Health (Comment: [facility])  Do you feel like you have everything you need to keep you well at home?: Yes  Care Transitions Interventions  No Identified Needs     Spoke briefly with Julio's wife Aryan Henderson for BPCI-A call. Aryan Henderson reports that [place] Amas is \"ok\" today. She stated he is presently napping. She stated he doesn't feel like getting up and moving much today. She denies edema or complaints of SOB. She declined full med review. CTN explained that a member of the Care Transition Central Team will be contacting them for further follow up calls, Aryan Henderson is in agreement and denies any other needs or concerns at this time.      Per chart review [facility] has already reached out to the PCP and will be following at home.      Follow Up  Future Appointments   Date Time Provider Phillip Quintero   8/5/2021  1:20 PM MD NICK Sorto RN
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
## 2021-08-10 NOTE — ED PROVIDER NOTES
Department of Emergency Medicine   ED  Provider Note  Admit Date/RoomTime: 8/10/2021  1:46 PM  ED Room: 17A/17A-17      8/10/21  3:02 PM EDT    History of Present Illness:   Shaun Pollock is a 78 y.o. male presenting to the ED from home for fall, beginning this AM. Per patient's wife, patient had a fall around 4-5 AM when he went to bathroom. Patient was recently admitted for acute exacerbation of heart failure on 7/31/2021 and had NSTEMI on 6/2021. His wife states that he has not been eating well and doing well generally. He had multiple falls since last admission and was intermittently confused with place and time. Denies LOC, head injury, immediate pain after fall. When seen in the ED, patient is awake, oriented x1. Denies fever, chills, dizziness, lightheadedness, headache, neck pain, chest pain, palpitations, SOB, change in bowel and urination. Compliant with medications. Review of Systems:   Pertinent positives and negatives are stated within HPI, all other systems reviewed and are negative.      --------------------------------------------- PAST HISTORY ---------------------------------------------  Past Medical History:  has a past medical history of Arthritis, BPH (benign prostatic hypertrophy), CAD (coronary artery disease), Cervical spondylosis, Constipation, CTS (carpal tunnel syndrome), Diabetes mellitus ([facility] [address] DM type 2 (diabetes mellitus, type 2) ([facility] [address] Exogenous obesity, Glaucoma, History of EKG, Hypercholesterolemia, Hypertension, OA (osteoarthritis), Partial blindness, Polyp of colon, PONV (postoperative nausea and vomiting), Preoperative clearance, Vitamin B12 deficiency, Vitamin D deficiency, and Wears glasses. Past Surgical History:  has a past surgical history that includes Diagnostic Cardiac Cath Lab Procedure (1990 approximately); eye surgery (Bilateral); Total shoulder arthroplasty (Left, 2000); Cataract removal with implant (Bilateral);  Total knee arthroplasty (Right, 11/30/2015); Colonoscopy (10/31/2013); joint replacement (Right, 12/08/2015); and Cardiac catheterization (06/22/2021). Social History:  reports that he quit smoking about 40 years ago. His smoking use included cigars. He started smoking about 67 years ago. He has a 120.00 pack-year smoking history. He quit smokeless tobacco use about 5 years ago. He reports that he does not drink alcohol and does not use drugs. Family History: family history includes Heart Disease in his mother; Heart Disease (age of onset: 52) in his father. The patients home medications have been reviewed. Allergies: Ace inhibitors; Anesthetics, amide; and Vicodin [hydrocodone-acetaminophen]      ------------------------- NURSING NOTES AND VITALS REVIEWED ---------------------------   The nursing notes within the ED encounter and vital signs as below have been reviewed. /66   Pulse 82   Temp 98.3 °F (36.8 °C) (Oral)   Resp 16   Ht 5' 9\" (1.753 m)   Wt 230 lb (104.3 kg)   SpO2 99%   BMI 33.97 kg/m²   Oxygen Saturation Interpretation: Normal    ---------------------------------------------------PHYSICAL EXAM--------------------------------------    Constitutional/General: Alert and oriented x1, well appearing, non toxic in NAD  Head: Normocephalic and atraumatic  Eyes: PERRL, EOMI, conjunctiva normal, sclera non icteric  Mouth: Oropharynx clear, handling secretions, no trismus, no asymmetry of the posterior oropharynx or uvular edema  Neck: Supple, full ROM, non tender to palpation in the midline, no stridor, no crepitus, no meningeal signs  Respiratory: rhonchi noted on RLL  Cardiovascular:  Regular rate. Regular rhythm. No murmurs, gallops, or rubs. 2+ distal pulses  Chest: No chest wall tenderness  GI:  Abdomen Soft, Non tender, Non distended. +BS. No organomegaly, no palpable masses,  No rebound, guarding, or rigidity. Musculoskeletal: Moves all extremities x 4. Tenderness to palpation on right knee.  Warm and well perfused, no clubbing, cyanosis, 2+ pitting edema bilateral lower extremity. Capillary refill <3 seconds  Integument: skin warm and dry. No rashes. Lymphatic: no lymphadenopathy noted  Neurologic: GCS 15, no focal deficits, symmetric strength 5/5 in the upper and lower extremities bilaterally  Psychiatric: Normal Affect      -------------------------------------------------- RESULTS / INTERVENTIONS -------------------------------------------------  All laboratory and radiology tests have been reviewed by this physician.     LABS:  Results for orders placed or performed during the hospital encounter of 08/10/21   CBC Auto Differential   Result Value Ref Range    WBC 3.4 (L) 4.5 - 11.5 E9/L    RBC 1.82 (L) 3.80 - 5.80 E12/L    Hemoglobin 6.4 (LL) 12.5 - 16.5 g/dL    Hematocrit 19.5 (L) 37.0 - 54.0 %    .1 (H) 80.0 - 99.9 fL    MCH 35.2 (H) 26.0 - 35.0 pg    MCHC 32.8 32.0 - 34.5 %    RDW 22.5 (H) 11.5 - 15.0 fL    Platelets 657 064 - 663 E9/L    MPV 10.6 7.0 - 12.0 fL    Neutrophils % 58.8 43.0 - 80.0 %    Lymphocytes % 35.1 20.0 - 42.0 %    Monocytes % 6.1 2.0 - 12.0 %    Eosinophils % 0.6 0.0 - 6.0 %    Basophils % 0.3 0.0 - 2.0 %    Neutrophils Absolute 2.01 1.80 - 7.30 E9/L    Lymphocytes Absolute 1.19 (L) 1.50 - 4.00 E9/L    Monocytes Absolute 0.20 0.10 - 0.95 E9/L    Eosinophils Absolute 0.00 (L) 0.05 - 0.50 E9/L    Basophils Absolute 0.00 0.00 - 0.20 E9/L    Anisocytosis 2+     Polychromasia 2+     Poikilocytes 1+     Schistocytes 1+     Bryce Cells 1+     Macro Ovalocytes 1+    Basic Metabolic Panel w/ Reflex to MG   Result Value Ref Range    Sodium 137 132 - 146 mmol/L    Potassium reflex Magnesium 3.8 3.5 - 5.0 mmol/L    Chloride 96 (L) 98 - 107 mmol/L    CO2 28 22 - 29 mmol/L    Anion Gap 13 7 - 16 mmol/L    Glucose 149 (H) 74 - 99 mg/dL    BUN 35 (H) 6 - 23 mg/dL    CREATININE 1.1 0.7 - 1.2 mg/dL    GFR Non-African American >60 >=60 mL/min/1.73    GFR African American >60     Calcium 8.7 8.6 - 10.2 mg/dL   Brain Natriuretic Peptide   Result Value Ref Range    Pro-BNP 20,135 (H) 0 - 450 pg/mL   Troponin   Result Value Ref Range    Troponin, High Sensitivity 63 (H) 0 - 11 ng/L   MAGNESIUM   Result Value Ref Range    Magnesium 2.0 1.6 - 2.6 mg/dL   Phosphorus   Result Value Ref Range    Phosphorus 3.6 2.5 - 4.5 mg/dL   Troponin   Result Value Ref Range    Troponin, High Sensitivity 63 (H) 0 - 11 ng/L   EKG 12 Lead   Result Value Ref Range    Ventricular Rate 72 BPM    Atrial Rate 72 BPM    P-R Interval 106 ms    QRS Duration 136 ms    Q-T Interval 474 ms    QTc Calculation (Bazett) 519 ms    P Axis 88 degrees    R Axis 48 degrees    T Axis 94 degrees   TYPE AND SCREEN   Result Value Ref Range    ABO/Rh A POS     Antibody Screen NEG    PREPARE RBC (CROSSMATCH), 1 Units   Result Value Ref Range    Product Code Blood Bank [id number]     Description Blood Bank Red Blood Cells, Leuko-reduced     Unit Number [id number]     Dispense Status Blood Bank issued        RADIOLOGY: Interpreted by Radiologist unless otherwise noted. CT HEAD WO CONTRAST   Final Result   CT head without contrast:      1. No skull fracture or acute intracranial abnormality. CT cervical spine without contrast:      1. No fracture or joint dislocation is seen. 2. Degenerative changes, as described. CT CERVICAL SPINE WO CONTRAST   Final Result   CT head without contrast:      1. No skull fracture or acute intracranial abnormality. CT cervical spine without contrast:      1. No fracture or joint dislocation is seen. 2. Degenerative changes, as described. XR CHEST (2 VW)   Final Result   Cardiomegaly and pulmonary vascular congestion. EKG: As interpreted by this ER physician.   Rate: 72  Rhythm: Sinus  Axis: normal  ST Segments: no acute change  T-Waves: no acute change  Interpretation: no acute changes  Comparison: stable as compared to patient's most recent EKG    Oxygen Saturation Interpretation: unit pRBC. Patient appears to be fluid overloaded and was given lasix IV 20 mg. Discussed with PCP Dr. Teofilo Sol and he prefers the patient to be admitted to ICU. Discussed the case with intensivist Dr. Rona Menezes who agrees to admit patient to ICU pending for bed. Patient was observed in ED for several hours and vital signs stable and BP improved. Decision made to admit patient to intermediate. This patient's ED course included: a personal history and physicial examination, re-evaluation prior to disposition, multiple bedside re-evaluations, IV medications, cardiac monitoring, continuous pulse oximetry and complex medical decision making and emergency management    Diagnosis:  1. Fall, initial encounter    2. Acute on chronic anemia    3. Elevated brain natriuretic peptide (BNP) level        Disposition:  Patient's disposition: Admit to telemetry  Patient's condition is stable. This patient was seen, examined and treated with Dr. Carmel Aaron. All pertinent aspects of the patient's care were discussed with the attending physician.            Benitez Hodges MD  Resident  08/10/21 [id number]

## 2021-08-10 NOTE — ED NOTES
Bed: 39  Expected date:   Expected time:   Means of arrival:   Comments:  EMS     Radha Warren RN  08/10/21 9346

## 2021-08-10 NOTE — LETTER
peptide (BNP) level, Fall, initial encounter, Fall at home, initial encounter, Acute on chronic anemia and DX codes: R79.89, W19. Siri Gonsalez. [place], [place], D64.9      PATIENT                 Name: Mina Mosqueda : 1940 (80 yrs)   Address: [address] [place] [address] Sex: Male   [place] [facility] [address]         Marital Status:    Employer: RETIRED         Amish: Islam   Primary Care Provider: Lev Anne MD         Primary Phone: [phone]   EMERGENCY CONTACT   Contact Name Legal Guardian? Relationship to Patient Home Phone Work Phone   1. Allyson Koch  2. Ana Parra      Spouse  Child [phone]                 GUARANTOR            Guarantor: Mina Mosqueda     : 1940   Address: [address] [place] [address] Sex: Male   [place] [facility] [place] [address]     Relation to Patient: Self       Home Phone: [phone]   Guarantor ID: [id number]       Work Phone:     Guarantor Employer: UNKNOWN         Status: UNKNOWN      COVERAGE        PRIMARY INSURANCE   Payor: MEDICARE Plan: MEDICARE PART A AND B   Payor Address: [facility] [address],  [facility] [address], [facility] [address]       Group Number:   Insurance Type: INDEMNITY   Subscriber Name: Vania Ocampo : 1940   Subscriber ID: You Lama. Rel. to Sub: Self   SECONDARY INSURANCE   Payor: MEDICAL MUTUAL Plan: [employer] [address]   Payor Address:  [place], [place] [address]          Group Number: [id number] Insurance Type: INDEMNITY   Subscriber Name: Vania Ocampo : 1940   Subscriber ID: [id number] Pat.  Rel. to Sub: SELF

## 2021-08-11 NOTE — PLAN OF CARE
Shift  Goal: Able to distinguish between reality-based and nonreality-based thinking  Description: Able to distinguish between reality-based and nonreality-based thinking  Outcome: Met This Shift  Goal: Able to interrupt nonreality-based thinking  Description: Able to interrupt nonreality-based thinking  Outcome: Met This Shift     Problem: Sleep Pattern Disturbance:  Goal: Appears well-rested  Description: Appears well-rested  Outcome: Met This Shift     Problem: Skin Integrity:  Goal: Will show no infection signs and symptoms  Description: Will show no infection signs and symptoms  Outcome: Met This Shift  Goal: Absence of new skin breakdown  Description: Absence of new skin breakdown  Outcome: Met This Shift

## 2021-08-11 NOTE — PROGRESS NOTES
# Kelly Loya is non-formulary and will not be dispensed by the pharmacy at this time. Please have patient bring in home supply of medication and deliver it to pharmacy for identification and barcode. If patient has not supplied medication by 1400 on 08/12/21, please notify pharmacy.  #

## 2021-08-11 NOTE — PROGRESS NOTES
[address]:                                                  Patient Name: Alan Bland    MRN: [id number]    : 1940    Room: [address] [place] [address]      Evaluating OT: Robert Rapp, 82 HiginioMontserrat Figueredo OTR/L; [id number]      Referring Provider: Ninoska Jarrell MD    Specific Provider Orders/Date: OT Eval and Treat 21      Diagnosis: Elevated BNP    Surgery: none this admission     Pertinent Medical History: Arthritis, CAD, DM-2, HTN, BPH, CTS, Glaucoma, OA, Hypercholesterolemia, NSTEMI, acute decompensated heart failure, Morbidly obese, falls at home     Recommended Adaptive Equipment:  TBD      Precautions:  Fall Risk,      Assessment of current deficits    [x] Functional mobility  [x]ADLs  [x] Strength               [x]Cognition    [x] Functional transfers   [x] IADLs         [x] Safety Awareness   [x]Endurance    [x] Fine Coordination              [x] Balance      [] Vision/perception   []Sensation     []Gross Motor Coordination  [] ROM  [] Delirium                   [] Motor Control     OT PLAN OF CARE   OT POC based on physician orders, patient diagnosis and results of clinical assessment    Frequency/Duration: 1-3 days/wk for 2 weeks PRN   Specific OT Treatment Interventions to include:   * Instruction/training on adapted ADL techniques and AE recommendations to increase functional independence within precautions       * Training on energy conservation strategies, correct breathing pattern and techniques to improve independence/tolerance for self-care routine  * Functional transfer/mobility training/DME recommendations for increased independence, safety, and fall prevention  * Patient/Family education to increase follow through with safety techniques and functional independence  * Recommendation of environmental modifications for increased safety with functional transfers/mobility and ADLs  * Cognitive retraining/development of therapeutic activities to improve problem solving, judgement, memory, and attention for increased safety/participation in ADL/IADL tasks  * Therapeutic exercise to improve motor endurance, ROM, and functional strength for ADLs/functional transfers  * Therapeutic activities to facilitate/challenge dynamic balance, stand tolerance for increased safety and independence with ADLs      Home Living: Pt poor historian at this time d/t to cognition. Per family/chart pt lives with wife in 3 story ranch home. Bathroom setup: walk in shower with shower chair  Equipment owned: ww, cane, shower chair, BSC    Prior Level of Function:  Assist with ADLs, dependent with IADLs  ambulated with ww most recently  Driving: no    Pain Level: 0/10  Cognition: A&O: 1/4 - oriented to self only (reoriented to place, time, situation) Follows single - multi step directions   Memory:  fair   Sequencing:  fair   Problem solving:  fair   Judgement/safety:  fair     Functional Assessment:  AM-PAC Daily Activity Raw Score: 15/24   Initial Eval Status  Date: 8/11/21 Treatment Status  Date: STGs = LTGs  Time frame: 10-14 days   Feeding Stand by Assist   Opening packages/tray set  IND   Grooming Stand by Assist   Seated EOB  Independent    UB Dressing Stand by Assist   Seated EOB  Independent    LB Dressing Moderate Assist   Rufino/doff socks seated EOB   Pt unable to simulate figure 4 technique attempting to bend over  Educated pt on safety d/t experiencing dizziness seated EOB  Stand by Assist    Bathing Moderate Assist  D/t fatigue and cognitive deficits  Stand by Assist    Toileting Moderate Assist   For safety   Stand by Assist    Bed Mobility  Log Roll: NT  Supine to sit: Minimal Assist   Sit to supine: Minimal Assist   Supine to sit:  Independent   Sit to supine: Independent    Functional Transfers Sit to stand: Min A   Stand to sit: Min A  2-3 side steps to [facility] using ww  Stand pivot: NT  Commode: NT  Sit to stand: Mod Ind   Stand to sit:Mod Ind  Stand pivot: Mod Ind  Commode: Mod Ind    Functional Mobility NT  Minimal Assist    Balance Sitting:     Static - SBA     Dynamic - SBA  Standing: Min A  Sitting:  Static: IND  Dynamic: IND  Standing: Mod Ind   Activity Tolerance Poor  FAIR   Visual/  Perceptual Glasses: yes                   Hand Dominance: Right   AROM (PROM) Strength Additional Info:  Goal:   RUE  WFL 4/5 good  and wfl FMC/dexterity noted during ADL tasks   Improve overall RUE strength to 4+/5 for participation in functional tasks       LUE WFL 4/5 Good  and wfl FMC/dexterity noted during ADL tasks   Improve overall LUE strength to 4+/5 for participation in functional tasks       [facility]   Sensation:  No c/o numbness or tingling  Tone: WFL   Edema: unremarkable    Comment: Cleared by RN to see pt. Upon arrival patient lying supine in bed and wife and son present and agreeable to OT session. Pt required Min A with bed mobility seated EOB, required assist for scooting to EOB d/t limited ability to follow directions for scooting and weakness. Pt completed side step to HOB using ww, Mod A of 2 for sit to stand EOB and educated on proper hand placement on EOB. At end of session, patient lying supine in bed with HOB elevated with call light and phone within reach, all lines and tubes intact. Overall patient demonstrated  decreased independence and safety during completion of ADL/functional transfer/mobility tasks. Pt would benefit from continued skilled OT to increase safety and independence with completion of ADL/IADL tasks for functional independence and quality of life. Rehab Potential: Good  for established goals     LTG: maximize independence with ADLs to return to PLOF    Patient and/or family were instructed on functional diagnosis, prognosis/goals and OT plan of care. Demonstrated fair understanding.     [] Malnutrition indicators have been identified and nursing has been notified to ensure a dietitian consult is ordered. Eval Complexity: LOW    Evaluation time includes thorough review of current medical information, gathering information on past medical & social history & PLOF, completion of standardized testing, informal observation of tasks, consultation with other medical professions/disciplines, assessment of data & development of POC/goals. Time In: 1046  Time Out: 1101  Total Treatment Time: none    Min Units   OT Eval Low 62105  x     OT Eval Medium 35351      OT Eval High 36468      OT Re-Eval [id number]       Therapeutic Ex [id number]       Therapeutic Activities 70465       ADL/Self Care 62064       Orthotic Management 31837       Manual 99674     Neuro Re-Ed 59268       Non-Billable Time          Evaluation Time additionally includes thorough review of current medical information, gathering information on past medical history/social history and prior level of function, interpretation of standardized testing/informal observation of tasks, assessment of data and development of plan of care and goals.           FILEMON Costa OTR/L; [id number]

## 2021-08-11 NOTE — CARE COORDINATION
Spoke with Pt and Son about Transition Plan of Care. Pt lives with Wife Giuliana Campa in a ranch home with 2 steps to enter. Pt has a ww and wc. Nebulizer but no med ordered for it. PCP: Dr. Douige Miles. [facility] - [facility]. Son has already spoke n with Michael Villasenor - Liaison for [facility] for Pt's return. Pt was discharged July 13th from [facility]. Pt was Vaccinated for [facility] CYDNEY February 2021. Discharge Plan is to [facility] when medically stable. SW/CM to follow for discharge needs. Referral made to [facility] for [facility]. Inge Aguila will review and notify SW of acceptance. Completed Envelope, HENS completed, and ambulance form. See soft chart.    Chiquita Renee, L.S.W.  [phone]

## 2021-08-11 NOTE — PROGRESS NOTES
Physical Therapy  Physical Therapy Initial Assessment     Name: Tangela Serum  : 1940  MRN: [id number]      Date of Service: 2021    Evaluating PT:  Fullerdonn Severino, PT, DPT [id number]    Room #:  3988/7130-U  Diagnosis:  Elevated brain natriuretic peptide (BNP) level [R79.89]  Fall, initial encounter [W19. XXXA]  Fall at home, initial encounter [G06. Tovar Josrrey, Y92.009]  Acute on chronic anemia [D64.9]  PMHx/PSHx:  HTN, Hypercholesterolemia, CAD, arthritis, Glaucoma, partial blindness L eye, DM, BPH, CTS, cerical spondylosis, vit B12 deficiency, vit D deficiency, cardiac cath 2021  Precautions:  Falls, cognition  Equipment Needs:  TBD    SUBJECTIVE:    Pt lives with wife in a 1 story home with 2 stairs to enter and 1 rail. Bed is on first floor and bath is on first floor. Pt ambulated with front [address] PTA. Recently discharged last month and was previously independent with mobility prior to that admission per family. Pt's mobility and cognition has been declining recently per family. OBJECTIVE:   Initial Evaluation  Date: 2021 Treatment Short Term/ Long Term   Goals   AM-PAC 6 Clicks      Was pt agreeable to Eval/treatment? yes     Does pt have pain? No c/o pain     Bed Mobility  Rolling: maxA  Supine to sit: maxA  Sit to supine: maxA  Scooting: maxA  Rolling: Roberto Carlos  Supine to sit: Roberto Carlos  Sit to supine: Roberto Carlos  Scooting: Roberto Carlos   Transfers Sit to stand: maxA  Stand to sit: maxA  Stand pivot: NT  Sit to stand: Roberto Carlos  Stand to sit: Roberto Carlos  Stand pivot: Roberto Carlos front [address]   Ambulation    side steps at EOB front [address] maxA  25 feet with front [address] [employer] negotiation: ascended and descended  NT  NA   ROM BUE:  Per OT note  BLE:  WNL     Strength BUE:  Per OT note  BLE:  Grossly 4/5     Balance Sitting EOB:  SBA  Dynamic Standing:  maxA front [address]  Sitting EOB:  Independent  Dynamic Standing:  Roberto Carlos front [address]     Pt is A & O x 1 self only.   Follows simple 1 step commands with increased time and repeated cues.  Sensation:  Pt denies numbness and tingling to extremities  Edema:  Unremarkable. Patient education  Pt educated on role of PT intervention. Pt educated on safety in room with utilization of call light for assistance with mobility. Patient response to education:   Pt verbalized understanding Pt demonstrated skill Pt requires further education in this area   yes yes yes     ASSESSMENT:    Conditions Requiring Skilled Therapeutic Intervention:    [x]Decreased strength     [x]Decreased ROM  [x]Decreased functional mobility  [x]Decreased balance   [x]Decreased endurance   [x]Decreased posture  []Decreased sensation  []Decreased coordination   []Decreased vision  [x]Decreased safety awareness   []Increased pain       Comments:  RN cleared pt for activity prior to session. Pt received supine in bed and agreeable to PT intervention at this time. Pt performed all functional mobility as noted above. Pt presents to hospital for second time in 1 month. Per family before first admission pt was relatively independent at home. They state his cognition and mobility have been declining recently. Assisted into standing but only able to tolerate side steps at EOB with maxA. Pt returned to supine at end of session and left with all needs met and call light in reach. Pt requires continued skilled PT intervention for the purposes of maximizing functional mobility and independence by addressing deficits described above. Treatment:  Patient practiced and was instructed in the following treatment:     Therapeutic Activities Completed:  o Functional mobility as noted above:   - Bed mobility: as noted above. Max VC and hand over hand guidance to facilitate efficient use of BUE on bed rail to promote more independent completion of task. - Transfer training: sit<>stand: maxA from eob. Max VC for proper hand placement and sequencing for safe and more independent completion of task.     - Ambulation: a few side Treatment Time  5 minutes     Evaluation Time includes thorough review of current medical information, gathering information on past medical history/social history and prior level of function, completion of standardized testing/informal observation of tasks, assessment of data and education on plan of care and goals.     CPT codes:  [] Low Complexity PT evaluation 67420  [x] Moderate Complexity PT evaluation 67280  [] High Complexity PT evaluation 70645  [] PT Re-evaluation 83271  [] Gait training 34412 0 minutes  [] Manual therapy 32281 0 minutes  [x] Therapeutic activities 98678 5 minutes  [] Therapeutic exercises 33656 0 minutes  [] Neuromuscular reeducation 88914 0 minutes     Darek Johnson, PT, DPT  [id number]

## 2021-08-11 NOTE — H&P
Charlott Osgood, MD FACP  Internal medicine   History and Physical      CHIEF COMPLAINT: Anemia      HISTORY OF PRESENT ILLNESS:    78-year-old man seen on the floor earlier this morning  Son at bedside  Spoke with the ER physician at the time of admission  Data reviewed in detail  Patient with recent myocardial infarction followed by congestive heart failure  Medical management was recommended by cardiology at the time  She presents with worsening dementia symptoms and family was requesting placement  In the meantime he was found to be profoundly anemic  He was transfused overnight  He was confused this morning  Son at bedside  Data reviewed in detail    Past Medical History:    Past Medical History:   Diagnosis Date    Arthritis     bilateral knee    BPH (benign prostatic hypertrophy) 8/4/2016    CAD (coronary artery disease)     Cervical spondylosis 8/4/2016    Constipation     prune juice in am    CTS (carpal tunnel syndrome) 8/4/2016    Diabetes mellitus ([facility] [address]     DM type 2 (diabetes mellitus, type 2) ([facility] [address] 8/4/2016    Exogenous obesity 8/4/2016    Glaucoma     bilateral    History of EKG 10/14/2015 per Dr Stevo Collazo on chart.     Hypercholesterolemia     Hypertension     OA (osteoarthritis) 8/4/2016    Partial blindness     left eye    Polyp of colon 8/4/2016    PONV (postoperative nausea and vomiting)     Preoperative clearance 11/12/2015    per Dr Stevo Collazo on chart; for right tjak Dr Cyril Mohs Vitamin B12 deficiency 8/4/2016    Vitamin D deficiency 8/4/2016    Wears glasses        Past Surgical History:    Past Surgical History:   Procedure Laterality Date    CARDIAC CATHETERIZATION  06/22/2021    [facility]    CATARACT REMOVAL WITH IMPLANT Bilateral     COLONOSCOPY  10/31/2013    DIAGNOSTIC CARDIAC CATH LAB PROCEDURE  1990 approximately    ? stent    EYE SURGERY Bilateral     cataract    JOINT REPLACEMENT Right 12/08/2015    SHOULDER ARTHROPLASTY Left 2000    TOTAL KNEE ARTHROPLASTY Right 11/30/2015       Medications Prior to Admission:    Medications Prior to Admission: docusate sodium (COLACE) 100 MG capsule, Take 100 mg by mouth daily  vitamin E 180 MG (400 UNIT) CAPS capsule, Take 180 mg by mouth daily  Cholecalciferol (VITAMIN D3) 125 MCG (5000 UT) TABS, Take 5,000 Units by mouth daily  hydrOXYzine (ATARAX) 25 MG tablet, Take 1 tablet by mouth 2 times daily  furosemide (LASIX) 20 MG tablet, Take 2 tablets by mouth daily  potassium chloride (KLOR-CON M) 20 MEQ extended release tablet, Take 1 tablet by mouth daily  atorvastatin (LIPITOR) 40 MG tablet, TAKE 1 TABLET BY MOUTH EVERY DAY  magnesium hydroxide (MILK OF MAGNESIA) 400 MG/5ML suspension, Take 30 mLs by mouth daily as needed for Constipation  isosorbide mononitrate (IMDUR) 60 MG extended release tablet, Take 1 tablet by mouth daily  carvedilol (COREG) 6.25 MG tablet, Take 1 tablet by mouth 2 times daily (with meals)  clopidogrel (PLAVIX) 75 MG tablet, Take 1 tablet by mouth daily  Ascorbic Acid (VITAMIN C) 500 MG CAPS, Take 500 mg by mouth daily  dapagliflozin (FARXIGA) 10 MG tablet, TAKE 1 TABLET BY MOUTH EVERY DAY IN THE MORNING  ferrous sulfate (IRON 325) 325 (65 Fe) MG tablet, Take 1 tablet by mouth daily (with breakfast)  tamsulosin (FLOMAX) 0.4 MG capsule, TAKE 1 CAPSULE BY MOUTH EVERYDAY AT BEDTIME  metFORMIN (GLUCOPHAGE-XR) 500 MG extended release tablet, Take 1 tablet by mouth 3 times daily (with meals)  pantoprazole (PROTONIX) 40 MG tablet, Take 1 tablet by mouth every morning (before breakfast)  glucose monitoring (FREESTYLE) kit, 1 kit by Does not apply route daily  FreeStyle Lancets MISC, 1 each by Does not apply route 2 times daily E11.9  blood glucose test strips (FREESTYLE TEST STRIPS) strip, Indications: freestyle test strips DX: E11.9 QD    Diagnosis is E 11.9  vitamin D (ERGOCALCIFEROL) 1.25 MG (02199 UT) CAPS capsule, Take 1 capsule by mouth once a week for 12 doses (Patient taking differently: Take 50,000 Units by mouth once a week - Takes on Thursdays)  aspirin 81 MG EC tablet, Take 81 mg by mouth nightly  Coenzyme Q10 (CO Q-10) 200 MG CAPS, Take 200 mg by mouth nightly   Omega-3 Fatty Acids (FISH OIL) 1200 MG CAPS, Take 1 capsule by mouth Daily with supper   Cyanocobalamin (VITAMIN B-12) 5000 MCG TBDP, Take 5,000 mcg by mouth daily   latanoprost (XALATAN) 0.005 % ophthalmic solution, Place 1 drop into the right eye nightly     Allergies:    Ace inhibitors; Anesthetics, amide; and Vicodin [hydrocodone-acetaminophen]    Social History:    reports that he quit smoking about 40 years ago. His smoking use included cigars. He started smoking about 67 years ago. He has a 120.00 pack-year smoking history. He quit smokeless tobacco use about 5 years ago. He reports that he does not drink alcohol and does not use drugs. Family History:   family history includes Heart Disease in his mother; Heart Disease (age of onset: 52) in his father. REVIEW OF SYSTEMS:  As above in the HPI, otherwise negative    PHYSICAL EXAM:    Vitals:  BP (!) 109/59   Pulse 88   Temp 97.3 °F (36.3 °C) (Temporal)   Resp 18   Ht 5' 9\" (1.753 m)   Wt 230 lb (104.3 kg)   SpO2 98%   BMI 33.97 kg/m²     General:  Awake, alert, disoriented well developed, well nourished, well groomed. No apparent distress. Morbidly obese  HEENT:  Normocephalic, atraumatic. Pupils equal, round, reactive to light. No scleral icterus. No conjunctival injection. No nasal discharge. Neck:  Supple  Heart:  RRR, no murmurs, gallops, rubs  Lungs:  CTA bilaterally, bilat symmetrical expansion, no wheeze, rales, or rhonchi  Abdomen:   Bowel sounds present, soft, nontender, no masses, no organomegaly, no peritoneal signs  Extremities:  No clubbing, cyanosis, or edema  Skin:  Warm and dry, no open lesions or rash  Neuro:  Cranial nerves 2-12 intact, no focal deficits  Breast: deferred  Rectal: deferred  Genitalia:  deferred    LABS: Data reviewed in detail    ASSESSMENT:      Active Problems:    Fall at home, initial encounter  Anemia of chronic kidney disease  No active GI bleeding  Recent myocardial infarction and heart failure  On medical management only  Underlying dementia  Patient Active Problem List   Diagnosis    Hypertension    Hypercholesterolemia    CAD (coronary artery disease)    Benign prostatic hyperplasia    CTS (carpal tunnel syndrome)    Cervical spondylosis    DM type 2 (diabetes mellitus, type 2) (HCC)    Exogenous obesity    OA (osteoarthritis)    Polyp of colon    Vitamin B12 deficiency    Vitamin D deficiency    [facility]bidly obese ([facility] [address]    Asthmatic bronchitis, mild intermittent, uncomplicated    Chest pain    Generalized weakness    NSTEMI (non-ST elevated myocardial infarction) ([facility] [address]    [facility]    Fall at home, initial encounter           PLAN:  Transfuse to keep hemoglobin above 8 in view of his underlying coronary disease and recent myocardial infarction  Resume other medications  Monitor labs  Will need subacute rehab  Reviewed in detail with the son at bedside  Tanner Owens MD  2:28 PM  8/11/2021

## 2021-08-12 NOTE — DISCHARGE SUMMARY
Physician Discharge Summary     Patient ID:  Kamron Lazaro  [id number]  23 y.o.  1940    Admit date: 8/10/2021    Discharge date and time: 8/12/2021  2:20 PM     Admission Diagnoses: Elevated brain natriuretic peptide (BNP) level [R79.89]  Fall, initial encounter [W19. XXXA]  Fall at home, initial encounter [I04. XXXA, Y92.009]  Acute on chronic anemia [D64.9]    Discharge Diagnoses:   Severe symptomatic anemia  Anemia of chronic disease  Recent myocardial infarction and diastolic heart failure  Well compensated diastolic CHF at present  Patient Active Problem List   Diagnosis    Hypertension    Hypercholesterolemia    CAD (coronary artery disease)    Benign prostatic hyperplasia    CTS (carpal tunnel syndrome)    Cervical spondylosis    DM type 2 (diabetes mellitus, type 2) (HCC)    Exogenous obesity    OA (osteoarthritis)    Polyp of colon    Vitamin B12 deficiency    Vitamin D deficiency    Morbidly obese ([facility] [place] [address]    Asthmatic bronchitis, mild intermittent, uncomplicated    Chest pain    Generalized weakness    NSTEMI (non-ST elevated myocardial infarction) ([facility] [place] [address]    Acute decompensated heart failure (HCC)    Fall at home, initial encounter       Consults:     Procedures:     Hospital Course:   75-year-old  man recently suffered from acute non-ST elevation myocardial infarction resulting in acute heart failure  Following this he developed viral pneumonia and multiple medical issues  Recently hospitalized with anemia and CHF again  Discharge back to home  Came back with altered mental status  Found to be severely anemic requiring 2 units of packed cells upon admission  Remained stable during the rest of the hospital course  Discharge to extended care facility    Discharge Exam:  See progress note from today    Disposition: Stable at the time of discharge  [facility] to [facility]  Patient Instructions:   Discharge Medication List as of 8/12/2021 11:47 AM      CONTINUE these medications which have CHANGED    Details   ! ! pantoprazole (PROTONIX) 40 MG tablet TAKE 1 TABLET BY MOUTH EVERY DAY BEFORE BREAKFAST, Disp-90 tablet, R-0DC to SNF       ! ! - Potential duplicate medications found. Please discuss with provider.       CONTINUE these medications which have NOT CHANGED    Details   docusate sodium (COLACE) 100 MG capsule Take 100 mg by mouth dailyHistorical Med      vitamin E 180 MG (400 UNIT) CAPS capsule Take 180 mg by mouth dailyHistorical Med      Cholecalciferol (VITAMIN D3) 125 MCG (5000 UT) TABS Take 5,000 Units by mouth dailyHistorical Med      hydrOXYzine (ATARAX) 25 MG tablet Take 1 tablet by mouth 2 times daily, Disp-60 tablet, R-2Normal      furosemide (LASIX) 20 MG tablet Take 2 tablets by mouth daily, Disp-90 tablet, R-1Normal      potassium chloride (KLOR-CON M) 20 MEQ extended release tablet Take 1 tablet by mouth daily, Disp-60 tablet, R-3Normal      atorvastatin (LIPITOR) 40 MG tablet TAKE 1 TABLET BY MOUTH EVERY DAY, Disp-90 tablet, R-1Normal      magnesium hydroxide (MILK OF MAGNESIA) 400 MG/5ML suspension Take 30 mLs by mouth daily as needed for ConstipationHistorical Med      isosorbide mononitrate (IMDUR) 60 MG extended release tablet Take 1 tablet by mouth daily, Disp-90 tablet, R-1Normal      carvedilol (COREG) 6.25 MG tablet Take 1 tablet by mouth 2 times daily (with meals), Disp-180 tablet, R-1Normal      clopidogrel (PLAVIX) 75 MG tablet Take 1 tablet by mouth daily, Disp-90 tablet, R-1Normal      Ascorbic Acid (VITAMIN C) 500 MG CAPS Take 500 mg by mouth daily, Disp-90 capsule, R-1Normal      dapagliflozin (FARXIGA) 10 MG tablet TAKE 1 TABLET BY MOUTH EVERY DAY IN THE MORNING, Disp-90 tablet, R-1Normal      ferrous sulfate (IRON 325) 325 (65 Fe) MG tablet Take 1 tablet by mouth daily (with breakfast), Disp-90 tablet, R-1Normal      tamsulosin (FLOMAX) 0.4 MG capsule TAKE 1 CAPSULE BY MOUTH EVERYDAY AT BEDTIME, Disp-90 capsule, R-1Normal      metFORMIN (GLUCOPHAGE-XR) 500 MG extended release tablet Take 1 tablet by mouth 3 times daily (with meals), Disp-270 tablet, R-1Normal      !! pantoprazole (PROTONIX) 40 MG tablet Take 1 tablet by mouth every morning (before breakfast), Disp-90 tablet, R-1Normal      vitamin D (ERGOCALCIFEROL) 1.25 MG (91745 UT) CAPS capsule Take 1 capsule by mouth once a week for 12 doses, Disp-12 capsule, R-3PATIENT WOULD LIKE A REFILL. THANK YOUNormal      aspirin 81 MG EC tablet Take 81 mg by mouth nightlyHistorical Med      Coenzyme Q10 (CO Q-10) 200 MG CAPS Take 200 mg by mouth nightly Historical Med      Omega-3 Fatty Acids (FISH OIL) 1200 MG CAPS Take 1 capsule by mouth Daily with supper Historical Med      Cyanocobalamin (VITAMIN B-12) 5000 MCG TBDP Take 5,000 mcg by mouth daily Historical Med      latanoprost (XALATAN) 0.005 % ophthalmic solution Place 1 drop into the right eye nightly , R-4Historical Med       !! - Potential duplicate medications found. Please discuss with provider.       STOP taking these medications       bisacodyl (BISAC-EVAC) 10 MG suppository Comments:   Reason for Stopping:         glucose monitoring (FREESTYLE) kit Comments:   Reason for Stopping:         FreeStyle Lancets MISC Comments:   Reason for Stopping:         blood glucose test strips (FREESTYLE TEST STRIPS) strip Comments:   Reason for Stopping:             Activity: As tolerated  Diet: Diabetic    Follow-up with PCP    Note that over 40 minutes was spent in preparing discharge papers, discussing discharge with patient, medication review, etc.    Signed:  Leane Gaucher, MD  8/12/2021  3:31 PM

## 2021-08-12 NOTE — PROGRESS NOTES
Ryan Thomas MD FACP  Internal medicine  Progress notes      CHIEF COMPLAINT: Anemia      HISTORY OF PRESENT ILLNESS:    8/11/2021  78year-old man seen on the floor earlier this morning  Son at bedside  Spoke with the ER physician at the time of admission  Data reviewed in detail  Patient with recent myocardial infarction followed by congestive heart failure  Medical management was recommended by cardiology at the time  She presents with worsening dementia symptoms and family was requesting placement  In the meantime he was found to be profoundly anemic  He was transfused overnight  He was confused this morning  Son at bedside  Data reviewed in detail  8/12/2021  Patient was seen on the floor  Remains confused  Mental status at his baseline  He got transfused 2 units so far  No active bleeding  Tolerating medications    Past Medical History:    Past Medical History:   Diagnosis Date    Arthritis     bilateral knee    BPH (benign prostatic hypertrophy) 8/4/2016    CAD (coronary artery disease)     Cervical spondylosis 8/4/2016    Constipation     prune juice in am    CTS (carpal tunnel syndrome) 8/4/2016    [facility]     DM type 2 (diabetes mellitus, type 2) ([facility] 8/4/2016    Exogenous obesity 8/4/2016    Glaucoma     bilateral    History of EKG 10/14/2015 per Dr Vicky Pisano on chart.     Hypercholesterolemia     Hypertension     OA (osteoarthritis) 8/4/2016    Partial blindness     left eye    Polyp of colon 8/4/2016    PONV (postoperative nausea and vomiting)     Preoperative clearance 11/12/2015    per Dr Vicky Pisano on chart; for right tjak Dr Kristin Marin Vitamin B12 deficiency 8/4/2016    Vitamin D deficiency 8/4/2016    Wears glasses        Past Surgical History:    Past Surgical History:   Procedure Laterality Date    CARDIAC CATHETERIZATION  06/22/2021    [facility]    CATARACT REMOVAL WITH IMPLANT Bilateral     COLONOSCOPY  10/31/2013    DIAGNOSTIC CARDIAC CATH LAB PROCEDURE  1990 approximately    ? stent    EYE SURGERY Bilateral     cataract    JOINT REPLACEMENT Right 12/08/2015    SHOULDER ARTHROPLASTY Left 2000    TOTAL KNEE ARTHROPLASTY Right 11/30/2015       Medications Prior to Admission:    Medications Prior to Admission: docusate sodium (COLACE) 100 MG capsule, Take 100 mg by mouth daily  vitamin E 180 MG (400 UNIT) CAPS capsule, Take 180 mg by mouth daily  Cholecalciferol (VITAMIN D3) 125 MCG (5000 UT) TABS, Take 5,000 Units by mouth daily  hydrOXYzine (ATARAX) 25 MG tablet, Take 1 tablet by mouth 2 times daily  furosemide (LASIX) 20 MG tablet, Take 2 tablets by mouth daily  potassium chloride (KLOR-CON M) 20 MEQ extended release tablet, Take 1 tablet by mouth daily  atorvastatin (LIPITOR) 40 MG tablet, TAKE 1 TABLET BY MOUTH EVERY DAY  magnesium hydroxide (MILK OF MAGNESIA) 400 MG/5ML suspension, Take 30 mLs by mouth daily as needed for Constipation  isosorbide mononitrate (IMDUR) 60 MG extended release tablet, Take 1 tablet by mouth daily  carvedilol (COREG) 6.25 MG tablet, Take 1 tablet by mouth 2 times daily (with meals)  clopidogrel (PLAVIX) 75 MG tablet, Take 1 tablet by mouth daily  Ascorbic Acid (VITAMIN C) 500 MG CAPS, Take 500 mg by mouth daily  dapagliflozin (FARXIGA) 10 MG tablet, TAKE 1 TABLET BY MOUTH EVERY DAY IN THE MORNING  ferrous sulfate (IRON 325) 325 (65 Fe) MG tablet, Take 1 tablet by mouth daily (with breakfast)  tamsulosin (FLOMAX) 0.4 MG capsule, TAKE 1 CAPSULE BY MOUTH EVERYDAY AT BEDTIME  metFORMIN (GLUCOPHAGE-XR) 500 MG extended release tablet, Take 1 tablet by mouth 3 times daily (with meals)  pantoprazole (PROTONIX) 40 MG tablet, Take 1 tablet by mouth every morning (before breakfast)  vitamin D (ERGOCALCIFEROL) 1.25 MG (67863 UT) CAPS capsule, Take 1 capsule by mouth once a week for 12 doses (Patient taking differently: Take 50,000 Units by mouth once a week - Takes on Thursdays)  aspirin 81 MG EC tablet, Take 81 mg by mouth nightly  Coenzyme Q10 (CO Q-10) 200 MG CAPS, Take 200 mg by mouth nightly   Omega-3 Fatty Acids (FISH OIL) 1200 MG CAPS, Take 1 capsule by mouth Daily with supper   Cyanocobalamin (VITAMIN B-12) 5000 MCG TBDP, Take 5,000 mcg by mouth daily   latanoprost (XALATAN) 0.005 % ophthalmic solution, Place 1 drop into the right eye nightly   [DISCONTINUED] glucose monitoring (FREESTYLE) kit, 1 kit by Does not apply route daily  [DISCONTINUED] FreeStyle Lancets MISC, 1 each by Does not apply route 2 times daily E11.9  [DISCONTINUED] blood glucose test strips (FREESTYLE TEST STRIPS) strip, Indications: freestyle test strips DX: E11.9 QD    Diagnosis is E 11.9    Allergies:    Ace inhibitors; Anesthetics, amide; and Vicodin [hydrocodone-acetaminophen]    Social History:    reports that he quit smoking about 40 years ago. His smoking use included cigars. He started smoking about 67 years ago. He has a 120.00 pack-year smoking history. He quit smokeless tobacco use about 5 years ago. He reports that he does not drink alcohol and does not use drugs. Family History:   family history includes Heart Disease in his mother; Heart Disease (age of onset: 52) in his father. REVIEW OF SYSTEMS:  As above in the HPI, otherwise negative    PHYSICAL EXAM:    Vitals:  BP 95/85   Pulse 85   Temp 96.8 °F (36 °C) (Temporal)   Resp 18   Ht 5' 9\" (1.753 m)   Wt 230 lb (104.3 kg)   SpO2 99%   BMI 33.97 kg/m²     General:  Awake, alert, disoriented well developed, well nourished, well groomed. No apparent distress. Morbidly obese  HEENT:  Normocephalic, atraumatic. Pupils equal, round, reactive to light. No scleral icterus. No conjunctival injection. No nasal discharge. Neck:  Supple  Heart:  RRR, no murmurs, gallops, rubs  Lungs:  CTA bilaterally, bilat symmetrical expansion, no wheeze, rales, or rhonchi  Abdomen:   Bowel sounds present, soft, nontender, no masses, no organomegaly, no peritoneal signs  Extremities:  No clubbing, cyanosis, or edema  Skin:  Warm and dry, no open lesions or rash  Neuro:  Cranial nerves 2-12 intact, no focal deficits  Breast: deferred  Rectal: deferred  Genitalia:  deferred    LABS: Data reviewed in detail    ASSESSMENT:      Active Problems:    Fall at home, initial encounter  Anemia of chronic kidney disease  No active GI bleeding  Recent myocardial infarction and heart failure  On medical management only  Underlying dementia  Patient Active Problem List   Diagnosis    Hypertension    Hypercholesterolemia    CAD (coronary artery disease)    Benign prostatic hyperplasia    CTS (carpal tunnel syndrome)    Cervical spondylosis    DM type 2 (diabetes mellitus, type 2) (HCC)    Exogenous obesity    OA (osteoarthritis)    Polyp of colon    Vitamin B12 deficiency    Vitamin D deficiency    Morbidly obese ([address]    Asthmatic bronchitis, mild intermittent, uncomplicated    Chest pain    Generalized weakness    NSTEMI (non-ST elevated myocardial infarction) ([address]    Acute decompensated heart failure [facility]    Fall at home, initial encounter           PLAN:  Transfuse to keep hemoglobin above 8 in view of his underlying coronary disease and recent myocardial infarction  Resumed other medications  Monitor labs  Medically stable for subacute rehab  Zach Majano MD  7:29 AM  8/12/2021

## 2021-08-12 NOTE — PLAN OF CARE
This Shift  Goal: Decrease in sensory misperception frequency  Description: Decrease in sensory misperception frequency  Outcome: Met This Shift  Goal: Able to refrain from responding to false sensory perceptions  Description: Able to refrain from responding to false sensory perceptions  Outcome: Met This Shift  Goal: Demonstrates accurate environmental perceptions  Description: Demonstrates accurate environmental perceptions  Outcome: Met This Shift  Goal: Able to distinguish between reality-based and nonreality-based thinking  Description: Able to distinguish between reality-based and nonreality-based thinking  Outcome: Met This Shift  Goal: Able to interrupt nonreality-based thinking  Description: Able to interrupt nonreality-based thinking  Outcome: Met This Shift     Problem: Sleep Pattern Disturbance:  Goal: Appears well-rested  Description: Appears well-rested  Outcome: Met This Shift     Problem: Skin Integrity:  Goal: Will show no infection signs and symptoms  Description: Will show no infection signs and symptoms  Outcome: Met This Shift  Goal: Absence of new skin breakdown  Description: Absence of new skin breakdown  Outcome: Met This Shift

## 2021-08-12 NOTE — DISCHARGE INSTR - COC
Continuity of Care Form    Patient Name: Kd Richards   :  1940  MRN:  [id number]    Admit date:  8/10/2021  Discharge date:  21    Code Status Order: Prior   Advance Directives:      Admitting Physician:  Merline Hawking, MD  PCP: Merline Hawking, MD    Discharging Nurse: [facility] Unit/Room#: 6809/6180-W  Discharging Unit Phone Number: [phone]    Emergency Contact:   Extended Emergency Contact Information  Primary Emergency Contact: [place]  Address: [address] [place] [address]           [address]  Home Phone: [phone]  Relation: Spouse  Secondary Emergency Contact: Jen Newman  Mobile Phone: [phone]  Relation: Child    Past Surgical History:  Past Surgical History:   Procedure Laterality Date    CARDIAC CATHETERIZATION  2021    [place]    CATARACT REMOVAL WITH IMPLANT Bilateral     COLONOSCOPY  10/31/2013    DIAGNOSTIC CARDIAC CATH LAB PROCEDURE   approximately    ? stent    EYE SURGERY Bilateral     cataract    JOINT REPLACEMENT Right 2015    SHOULDER ARTHROPLASTY Left 2000    TOTAL KNEE ARTHROPLASTY Right 2015       Immunization History:   Immunization History   Administered Date(s) Administered    COVID-19, Weller Peter, PF, 30mcg/0.3mL 2021, 02/10/2021    Influenza, High Dose (Fluzone 65 yrs and older) 2011, 09/10/2012, 10/24/2014, 10/06/2015, 2016, 2017, 2018, 2019    Influenza, High-dose, Quadv, 65 yrs +, IM (Fluzone) 10/01/2020    Pneumococcal Conjugate 13-valent ([facility]) 10/06/2015    Pneumococcal Polysaccharide ([id number]) 1997, 2001, 2014    Zoster Live (Zostavax) 2011       Active Problems:  Patient Active Problem List   Diagnosis Code    Hypertension I10    Hypercholesterolemia E78.00    CAD (coronary artery disease) I25.10    Benign prostatic hyperplasia N40.0    CTS (carpal tunnel syndrome) G56.00    Cervical spondylosis M47.812  DM type 2 (diabetes mellitus, type 2) ([facility]) E11.9    Exogenous obesity E66.09    OA (osteoarthritis) M19.90    Polyp of colon K63.5    Vitamin B12 deficiency E53.8    Vitamin D deficiency E55.9    Morbidly obese ([facility]) E66.01    Asthmatic bronchitis, mild intermittent, uncomplicated L96.56    Chest pain R07.9    Generalized weakness R53.1    NSTEMI (non-ST elevated myocardial infarction) ([facility]) I21.4    Acute decompensated heart failure ([address] I50.9    Fall at home, initial encounter W19. Christopher Edwards, Y92.009       Isolation/Infection:   Isolation            No Isolation          Patient Infection Status       Infection Onset Added Last Indicated Last Indicated By Review Planned Expiration Resolved Resolved By    None active    Resolved    COVID-19 Rule Out 07/03/21 07/03/21 07/03/21 Respiratory Panel, Molecular, with COVID-19 (Restricted: peds pts or suitable admitted adults) (Ordered)   07/03/21 Rule-Out Test Resulted    C-diff Rule Out 06/27/21 06/27/21 06/27/21 CLOSTRIDIUM DIFFICILE EIA (Ordered)   06/28/21 Mari Burroughs RN    Order discontinued by RN/physician    C-diff Rule Out 12/22/20 12/22/20 12/22/20 CLOSTRIDIUM DIFFICILE EIA (Ordered)   12/23/20 Rule-Out Test Resulted    COVID-19 Rule Out 12/22/20 12/22/20 12/22/20 COVID-19 (Ordered)   12/22/20 Rule-Out Test Resulted            Nurse Assessment:  Last Vital Signs: BP 95/85   Pulse 85   Temp 96.8 °F (36 °C) (Temporal)   Resp 18   Ht 5' 9\" (1.753 m)   Wt 230 lb (104.3 kg)   SpO2 99%   BMI 33.97 kg/m²     Last documented pain score (0-10 scale): Pain Level: 4  Last Weight:   Wt Readings from Last 1 Encounters:   08/10/21 230 lb (104.3 kg)     Mental Status:  disoriented and alert    IV Access:  - None    Nursing Mobility/ADLs:  Walking   Dependent  Transfer  Dependent  Bathing  Dependent  Dressing  Dependent  Toileting  Dependent  Feeding  Dependent  Med Admin  Assisted  Med Delivery   whole    Wound Care Documentation and Therapy: Elimination:  Continence:   · Bowel: No  · Bladder: No  Urinary Catheter: None   Colostomy/Ileostomy/Ileal Conduit: No       Date of Last BM: 8/13/21    Intake/Output Summary (Last 24 hours) at 8/12/2021 0723  Last data filed at 8/12/2021 0447  Gross per 24 hour   Intake 932.5 ml   Output --   Net 932.5 ml     I/O last 3 completed shifts: In: 932.5 [P.O.:420; Blood:512.5]  Out: -     Safety Concerns: At Risk for Falls    Impairments/Disabilities:      None    Nutrition Therapy:  Current Nutrition Therapy:   - Oral Diet:  General    Routes of Feeding: Oral  Liquids: Thin Liquids  Daily Fluid Restriction: no  Last Modified Barium Swallow with Video (Video Swallowing Test): not done    Treatments at the Time of Hospital Discharge:   Respiratory Treatments:   Oxygen Therapy:  is not on home oxygen therapy. Ventilator:    - No ventilator support    Rehab Therapies: Physical Therapy and Occupational Therapy  Weight Bearing Status/Restrictions: No weight bearing restirctions  Other Medical Equipment (for information only, NOT a DME order):     Other Treatments:     Patient's personal belongings (please select all that are sent with patient):  None    RN SIGNATURE:  Electronically signed by Portia Villasenor RN on 8/12/21 at 11:46 AM EDT    CASE MANAGEMENT/SOCIAL WORK SECTION    Inpatient Status Date: ***    Readmission Risk Assessment Score:  Readmission Risk              Risk of Unplanned Readmission:  40           Discharging to Facility/ Agency   · Name:   · Address:  · Phone:  · Fax:    Dialysis Facility (if applicable)   · Name:  · Address:  · Dialysis Schedule:  · Phone:  · Fax:    / signature: {Esignature:[id number]:::0}    PHYSICIAN SECTION    Prognosis: {Prognosis:0225747144:::0}    Condition at Discharge: Estrada Hinson Patient Condition:824487561:::0}    Rehab Potential (if transferring to Rehab): {Prognosis:0690847376:::0}    Recommended Labs or Other Treatments After Discharge: ***    Physician Certification: I certify the above information and transfer of Alan Bland  is necessary for the continuing treatment of the diagnosis listed and that he requires {Admit to Appropriate Level of Care:38861:::0} for {GREATER/LESS:718897518} 30 days.      Update Admission H&P: {CHP DME Changes in HandP:561584054:::0}    PHYSICIAN SIGNATURE:  Ninoska Jarrell MD

## 2021-08-16 NOTE — CARE COORDINATION
[facility] Transitions Follow Up Call    2021    Patient: Anjelica Glynn  Patient : 1940   MRN: <[id number]>  Reason for Admission:   Discharge Date: 21 RARS: Readmission Risk Score: 34    Patient discharged to [address] [place] [address] on 21. Email sent to [facility] to verify whether or not they will be following patient for BPCI-A bundle. Received email from [employer] with [facility]. She confirmed [facility] will be following this patient.     CTN removing self from care team.        Teresita Carlos RN

## 2021-09-08 NOTE — CARE COORDINATION
Per [employer] Email:    Transitioned to LTC at [facility] on 9.2.2021 - facility phone # [phone]

## 2021-09-09 NOTE — ED NOTES
Bed: 22  Expected date:   Expected time:   Means of arrival:   Comments:  aubrie Couch RN  09/09/21 0945

## 2021-09-09 NOTE — PROGRESS NOTES
by most insurance plans. The following levels of hospice care were discussed including, routine level of hospice care at private home or facility, which patient/family is responsible for any room and board fees at the facility, and general in patient level of care (GIP) at the [place] [address] for short term symptom management. Per Medicare guidelines, a patient is considered appropriate for GIP if they have uncontrolled symptoms such as pain, agitation, labored breathing or nausea/vomiting. Once symptoms become managed, the patient would need to be moved to a lower level of care such as home with hospice, ECF with hospice or the Transition program.  [place] [address] transition program also explained which is routine hospice care provided at the [place] [address] instead of an ECF or home. The transition program is private pay $300/day for room/board. Room/board for the transition program is not covered by Medicaid as would be in an ECF. Family informed that with the routine level of care at home or ECF,  the hospice team consists of the RN who visits 1-3 times a week, a  who visits within the first five days of the hospice election, the personal care team who visit 1-3 times a week, non-medical volunteers and Chaplains. Explained that at home in routine level of care, familles are responsible for the 24 hour care. Discussed that under hospice care patient would not receive chemotherapy, radiation, immune therapy, IV antibiotics, dialysis or blood transfusions. Explained that once in hospice care, all aggressive treatments would be stopped and allow nature to takes its course with focus on comfort care for the patient. Plan per family is for patient to return to [address]  with [facility]. I was able to speak with Yonatan Myers at [address] to update that patient will return with [facility]. NO equipment needs for tonight and physician is Dr. Jimmye Crigler.    updated and she will set up transport to facility. Stand by RN updated of admission  Discharge Plan:  Discharge Disposition; ECF    [facility] plan:  1. ECF with [facility]  2. Please call [facility] [phone] with any questions. 3. Patient not currently under the care of hospice.     Electronically signed by Shreya Shea RN on 9/9/2021 at 4:48 PM

## 2021-09-09 NOTE — FLOWSHEET NOTE
Report given to Kike Carranza RN, at [facility]).

## 2021-09-09 NOTE — CARE COORDINATION
CM note. Pt to d/c to [facility] set up [address] Updated RN and family at bedside.   Adelina Sincliar RN CM

## 2021-09-10 NOTE — ED PROVIDER NOTES
Chief complaint: Anemia      HPI:  9/10/21, Time: 10:36 AM EDT    CHEPE Shah Officer is a 78 y.o. male presenting to the ED for anemia. History is obtained from the patient's medical record as well as the patient's family. Patient was sent in today for anemia. Per the patient's daughter he has had anemia developing for months. The patient is DNR CC. They have discussed them biopsy but have not pursued secondary to the patient being DNR CC and is being aggressive measures. Per the daughter the patient has had significant decline and is currently in a nursing home. The history is otherwise limited secondary to the patient's altered mental status which is his baseline. ROS:   Review of Systems   Unable to perform ROS: Dementia       --------------------------------------------- PAST HISTORY ---------------------------------------------  Past Medical History:  has a past medical history of Arthritis, BPH (benign prostatic hypertrophy), CAD (coronary artery disease), Cervical spondylosis, Constipation, CTS (carpal tunnel syndrome), Diabetes mellitus ([facility] [address] DM type 2 (diabetes mellitus, type 2) ([facility] [address] Exogenous obesity, Glaucoma, History of EKG, Hypercholesterolemia, Hypertension, OA (osteoarthritis), Partial blindness, Polyp of colon, PONV (postoperative nausea and vomiting), Preoperative clearance, Vitamin B12 deficiency, Vitamin D deficiency, and Wears glasses. Past Surgical History:  has a past surgical history that includes Diagnostic Cardiac Cath Lab Procedure (1990 approximately); eye surgery (Bilateral); Total shoulder arthroplasty (Left, 2000); Cataract removal with implant (Bilateral); Total knee arthroplasty (Right, 11/30/2015); Colonoscopy (10/31/2013); joint replacement (Right, 12/08/2015); and Cardiac catheterization (06/22/2021). Social History:  reports that he quit smoking about 40 years ago. His smoking use included cigars. He started smoking about 67 years ago.  He has a 120.00 pack-year smoking history. He quit smokeless tobacco use about 5 years ago. He reports that he does not drink alcohol and does not use drugs. Family History: family history includes Heart Disease in his mother; Heart Disease (age of onset: 52) in his father. The patients home medications have been reviewed. Allergies: Ace inhibitors; Anesthetics, amide; and Vicodin [hydrocodone-acetaminophen]    ---------------------------------------------------PHYSICAL EXAM--------------------------------------        Constitutional/General: Alert and oriented x1-agitated and rolling around in the bed, attempting to hit staff members  Head: Normocephalic and atraumatic  Eyes: Conjunctival pallor present  Mouth: Oropharynx clear, handling secretions, no trismus  Neck: Supple, full ROM,  Pulmonary: Lungs clear to auscultation bilaterally, no wheezes, rales, or rhonchi. Not in respiratory distress  Cardiovascular:  Regular rate. Regular rhythm. No murmurs  Chest: no chest wall tenderness  Abdomen: Soft. Non tender. Non distended. No rebound, guarding, or rigidity. No pulsatile masses appreciated. : Performed with male RN chaperone present, stool light brown, guaiac negative  Musculoskeletal: Moves all extremities x 4. Warm and well perfused, no clubbing, cyanosis, or edema. Capillary refill <3 seconds  Skin: warm and dry. No rashes. Neurologic: GCS 14-one off for confusion  Psych: Agitated    -------------------------------------------------- RESULTS -------------------------------------------------  I have personally reviewed all laboratory and imaging results for this patient. Results are listed below.      LABS:  Results for orders placed or performed during the hospital encounter of 09/09/21   CBC Auto Differential   Result Value Ref Range    WBC 5.4 4.5 - 11.5 E9/L    RBC 1.74 (L) 3.80 - 5.80 E12/L    Hemoglobin 5.8 (LL) 12.5 - 16.5 g/dL    Hematocrit 17.8 (L) 37.0 - 54.0 %    .3 (H) 80.0 - 99.9 fL    MCH 33.3 26.0 - 35.0 pg    MCHC 32.6 32.0 - 34.5 %    RDW 21.8 (H) 11.5 - 15.0 fL    Platelets 646 415 - 487 E9/L    MPV 10.4 7.0 - 12.0 fL    Neutrophils % 59.1 43.0 - 80.0 %    Lymphocytes % 24.3 20.0 - 42.0 %    Monocytes % 12.2 (H) 2.0 - 12.0 %    Eosinophils % 0.2 0.0 - 6.0 %    Basophils % 0.9 0.0 - 2.0 %    Neutrophils Absolute 3.40 1.80 - 7.30 E9/L    Lymphocytes Absolute 1.30 (L) 1.50 - 4.00 E9/L    Monocytes Absolute 0.65 0.10 - 0.95 E9/L    Eosinophils Absolute 0.00 (L) 0.05 - 0.50 E9/L    Basophils Absolute 0.05 0.00 - 0.20 E9/L    Metamyelocytes Relative 0.9 0.0 - 1.0 %    Myelocyte Percent 2.6 0 - 0 %    nRBC 0.9 /100 WBC    Anisocytosis 2+     Polychromasia 1+     Hypochromia 2+     Poikilocytes 1+     Schistocytes 1+     Acanthocytes 1+     Ovalocytes 1+     Tear Drop Cells 1+    Comprehensive Metabolic Panel w/ Reflex to MG   Result Value Ref Range    Sodium 136 132 - 146 mmol/L    Potassium reflex Magnesium 3.8 3.5 - 5.0 mmol/L    Chloride 96 (L) 98 - 107 mmol/L    CO2 27 22 - 29 mmol/L    Anion Gap 13 7 - 16 mmol/L    Glucose 204 (H) 74 - 99 mg/dL    BUN 20 6 - 23 mg/dL    CREATININE 1.2 0.7 - 1.2 mg/dL    GFR Non-African American 58 >=60 mL/min/1.73    GFR African American >60     Calcium 8.4 (L) 8.6 - 10.2 mg/dL    Total Protein 6.6 6.4 - 8.3 g/dL    Albumin 2.9 (L) 3.5 - 5.2 g/dL    Total Bilirubin 0.6 0.0 - 1.2 mg/dL    Alkaline Phosphatase 178 (H) 40 - 129 U/L    ALT 10 0 - 40 U/L    AST 14 0 - 39 U/L   Troponin   Result Value Ref Range    Troponin, High Sensitivity 168 (H) 0 - 11 ng/L   Lactate, Sepsis   Result Value Ref Range    Lactic Acid, Sepsis 4.1 (HH) 0.5 - 1.9 mmol/L   Lactate, Sepsis   Result Value Ref Range    Lactic Acid, Sepsis 3.4 (H) 0.5 - 1.9 mmol/L   Troponin   Result Value Ref Range    Troponin, High Sensitivity 163 (H) 0 - 11 ng/L   Lactic Acid, Plasma   Result Value Ref Range    Lactic Acid 5.4 (HH) 0.5 - 2.2 mmol/L   Hemoglobin and Hematocrit, Blood   Result Value Ref Range    Hemoglobin 6.1 (LL) 12.5 - 16.5 g/dL    Hematocrit 18.4 (L) 37.0 - 54.0 %   EKG 12 Lead   Result Value Ref Range    Ventricular Rate 86 BPM    Atrial Rate 86 BPM    P-R Interval 108 ms    QRS Duration 136 ms    Q-T Interval 420 ms    QTc Calculation (Bazett) 502 ms    P Axis 85 degrees    R Axis 54 degrees    T Axis 83 degrees   TYPE AND SCREEN   Result Value Ref Range    ABO/Rh A POS     Antibody Screen NEG    PREPARE RBC (CROSSMATCH), 1 Units   Result Value Ref Range    Product Code Blood Bank [id number]     Description Blood Bank Red Blood Cells, Leuko-reduced     Unit Number [id number]     Dispense Status Blood Bank transfused        RADIOLOGY:  Interpreted by Radiologist.  XR CHEST PORTABLE   Final Result   Cardiomegaly. No new abnormal findings allowing for rightward rotation. EKG:  Th sinus rhythm, rate of 86, diffuse ST segment depressions, right bundle branch block, no ST segment elevation, LA interval 108 MS,  MS,  ms. Interpreted by me      ------------------------- NURSING NOTES AND VITALS REVIEWED ---------------------------   The nursing notes within the ED encounter and vital signs as below have been reviewed by myself. BP (!) 59/46   Pulse 73   Temp 97.1 °F (36.2 °C) (Axillary)   Resp 15   Wt 209 lb 8 oz (95 kg)   SpO2 97%   BMI 30.94 kg/m²   Oxygen Saturation Interpretation: Normal    The patients available past medical records and past encounters were reviewed.         ------------------------------ ED COURSE/MEDICAL DECISION MAKING----------------------  Medications   droperidol (INAPSINE) injection 1.25 mg (1.25 mg IntraVENous Given 9/9/21 1009)   0.9 % sodium chloride bolus (0 mLs IntraVENous Stopped 9/9/21 1312)   LORazepam (ATIVAN) injection 0.5 mg (0.5 mg IntraMUSCular Given 9/9/21 1122)   0.9 % sodium chloride bolus (0 mLs IntraVENous Stopped 9/9/21 1500)             Medical Decision Making:   I, Dr. Aline Nair am the primary physician of record. Graeme Sanchez is a 78 y.o. male who presents to the ED for low hemoglobin. Upon arrival the patient was profoundly hypotensive. The patient is DNR CC. Patient was ordered IV fluids and blood. Despite resuscitative efforts and compliance with his DNR CC the patient remained hypotensive. The case was discussed extensively with the patient's family. They again wished for no aggressive measures for the patient. They would like to consult hospice. Hospice was consulted emergency department. The patient will go back to his facility with hospice services. Re-Evaluations/Consultations:             ED Course as of Sep 10 1045   Thu Sep 09, 2021   1317 At this time have discussed the results with the patient's daughter days critically ill. Patient is DNR CC. The patient's daughter is calling her mother as well as the patient's son. [MT]   3181 The patient is in the bed lying no acute distress the patient is hypotensive at this time. Patient's family still does wish for DNR CC    [MT]      ED Course User Index  [MT] Mere Warren DO               This patient's ED course included: History, physical examination, reevaluation prior to disposition, labs, imaging, telemetry monitoring, EKG       This patient has remained hemodynamically stable during their ED course. Counseling: The emergency provider has spoken with the patient and discussed todays results, in addition to providing specific details for the plan of care and counseling regarding the diagnosis and prognosis. Questions are answered at this time and they are agreeable with the plan.       --------------------------------- IMPRESSION AND DISPOSITION ---------------------------------    IMPRESSION  1. Hospice care    2. Anemia, unspecified type    3.  NSTEMI (non-ST elevated myocardial infarction) ([facility] [place] [address]        DISPOSITION  Disposition: Discharge to nursing home  Patient condition is serious        NOTE: This report was transcribed using voice recognition software.  Every effort was made to ensure accuracy; however, inadvertent computerized transcription errors may be present          Paula Billingsley DO  09/10/21 4312

## 2021-09-10 NOTE — PROGRESS NOTES
Patient: Mai Bucio 78 y.o. male     Date of Service: 7/6/21      Chief complaint: No chief complaint on file. HISTORY OF PRESENTING ILLNESS     Mr. Ayesha Martinez is a 78year old male with a past medical history of CAD, CHF, s/p angioplasty,Type 2 DM, hyperlipidemia, HTN, anemia who was admitted to the hospital due to chest pain on 06/21/2021. His chest pain was treated with nitroglycerin and heparin. He was unable to get a heart catheterization due to inability to lie down flat. He was treated with IV diuretics for CHF. He was also empirically treated with Zosyn for suspected pneumonia. He also received 2 units of PRBCs and IV iron x1 for anemia. He did have some delirium in the hospital and required restraints and haldol. He was discharged on 07/03 to [facility]. Health Maintenance:  Health Maintenance Due   Topic Date Due    Diabetic retinal exam  Never done    DTaP/Tdap/Td vaccine (1 - Tdap) Never done    Shingles Vaccine (2 of 3) 02/13/2012    Annual Wellness Visit (AWV)  Never done    Diabetic foot exam  08/26/2020    Flu vaccine (1) 09/01/2021     Past Medical History:      Diagnosis Date    Arthritis     bilateral knee    BPH (benign prostatic hypertrophy) 8/4/2016    CAD (coronary artery disease)     Cervical spondylosis 8/4/2016    Constipation     prune juice in am    CTS (carpal tunnel syndrome) 8/4/2016    Diabetes mellitus ([facility] [address]     DM type 2 (diabetes mellitus, type 2) ([facility] [address] 8/4/2016    Exogenous obesity 8/4/2016    Glaucoma     bilateral    History of EKG 10/14/2015 per Dr Rhianna Moran on chart.     Hypercholesterolemia     Hypertension     OA (osteoarthritis) 8/4/2016    Partial blindness     left eye    Polyp of colon 8/4/2016    PONV (postoperative nausea and vomiting)     Preoperative clearance 11/12/2015    per Dr Rhianna Moran on chart; for right tjak Dr [facility]uckett [place] Vitamin B12 deficiency 8/4/2016    Vitamin D deficiency 8/4/2016    Wears glasses      Past Surgical History:        Procedure Laterality Date    CARDIAC CATHETERIZATION  2021    [place]    CATARACT REMOVAL WITH IMPLANT Bilateral     COLONOSCOPY  10/31/2013    DIAGNOSTIC CARDIAC CATH LAB PROCEDURE   approximately    ? stent    EYE SURGERY Bilateral     cataract    JOINT REPLACEMENT Right 2015    SHOULDER ARTHROPLASTY Left 2000    TOTAL KNEE ARTHROPLASTY Right 2015     Allergies:    Ace inhibitors; Anesthetics, amide; and Vicodin [hydrocodone-acetaminophen]     Family History:       Problem Relation Age of Onset    Heart Disease Mother         AFIB    Heart Disease Father 52         MI     Review of Systems:   Review of Systems   Constitutional: Negative for activity change, chills and fever. HENT: Negative for rhinorrhea, sneezing and sore throat. Eyes: Negative for redness. Respiratory: Negative for cough, chest tightness and shortness of breath. Cardiovascular: Negative for chest pain, palpitations and leg swelling. Gastrointestinal: Negative for abdominal pain, blood in stool, diarrhea, nausea and vomiting. Genitourinary: Negative for decreased urine volume, dysuria, flank pain, frequency, hematuria and urgency. Musculoskeletal: Negative for arthralgias and myalgias. Neurological: Negative for dizziness, syncope, weakness, light-headedness and headaches. Psychiatric/Behavioral: Negative for agitation. The patient is not nervous/anxious. PHYSICAL EXAM   Vitals: There were no vitals taken for this visit. Physical Exam  Vitals reviewed. Constitutional:       Appearance: Normal appearance. HENT:      Head: Normocephalic and atraumatic. Cardiovascular:      Rate and Rhythm: Normal rate and regular rhythm. Pulses: Normal pulses. Heart sounds: Normal heart sounds. Pulmonary:      Effort: Pulmonary effort is normal. No respiratory distress. Breath sounds: Normal breath sounds. No wheezing.    Abdominal:      General: Bowel sounds are normal. There is no distension. Palpations: Abdomen is soft. Tenderness: There is no abdominal tenderness. There is no guarding. Musculoskeletal:         General: Normal range of motion. Cervical back: Normal range of motion. No rigidity. Skin:     General: Skin is warm. Neurological:      General: No focal deficit present. Mental Status: He is alert and oriented to person, place, and time. Psychiatric:         Mood and Affect: Mood normal.       ASSESSMENT AND PLAN     NSTEMI  Coronary angiography showing multivessel disease with severe left ventricular dysfunction  -Aspirin 81 mg daily  -Atorvastatin 40 mg daily  -Carvedilol 6.25 mg BID  -Plavix 75 mg daily    Hypokalemia  K+ 3.4, likely due to concurrent lasix use  -Klor-Con 20 mEq BID ordered x 1    BPH  -Flomax 0.4 mg nightly    CHF  Echo (06/23/21) showed EF 40-45%.  Stage II diastolic dysfunction  -Lasix 20 mg daily  -Isosorbide Dinitrate 60 mg daily    Glaucoma  -Lantanoprost 1 drop nightly    Type 2 DM  Hb A1C 6.7%  -Metformin 500 mg TID  -Farxiga 10 mg daily    Anemia  Hb 7.8, s/p 2 units of PRBC in hospital  -Repeat CBC ordered  -FOBT ordered  -Transfuse if Hb <8  -Protonix 40 mg daily  -Vitamin B12 1000 mcg daily    Mood disorder (unsure of the specifics)  -Seroquel 25 mg nightly    Medication List:    Current Outpatient Medications   Medication Sig Dispense Refill    acetaminophen (TYLENOL) 325 MG tablet Take 650 mg by mouth every 4 hours as needed for Pain or Fever      atorvastatin (LIPITOR) 40 MG tablet Take 40 mg by mouth daily      dapagliflozin (FARXIGA) 10 MG tablet Take 10 mg by mouth every morning      divalproex (DEPAKOTE) 125 MG DR tablet Take 125 mg by mouth 3 times daily      bisacodyl (DULCOLAX) 10 MG suppository Place 10 mg rectally daily as needed for Constipation      Sodium Phosphates (FLEET) 7-19 GM/118ML Place 1 enema rectally once as needed (constipation)      furosemide (LASIX) 40 MG tablet Take 40 mg by mouth daily      melatonin 3 MG TABS tablet Take 6 mg by mouth nightly as needed (sleep)      mirtazapine (REMERON) 7.5 MG tablet Take 7.5 mg by mouth nightly      tamsulosin (FLOMAX) 0.4 MG capsule Take 0.4 mg by mouth nightly      docusate sodium (COLACE) 100 MG capsule Take 100 mg by mouth daily      vitamin E 180 MG (400 UNIT) CAPS capsule Take 180 mg by mouth daily      Cholecalciferol (VITAMIN D3) 125 MCG (5000 UT) TABS Take 5,000 Units by mouth daily      potassium chloride (KLOR-CON M) 20 MEQ extended release tablet Take 1 tablet by mouth daily 60 tablet 3    magnesium hydroxide (MILK OF MAGNESIA) 400 MG/5ML suspension Take 30 mLs by mouth daily as needed for Constipation      isosorbide mononitrate (IMDUR) 60 MG extended release tablet Take 1 tablet by mouth daily 90 tablet 1    carvedilol (COREG) 6.25 MG tablet Take 1 tablet by mouth 2 times daily (with meals) 180 tablet 1    clopidogrel (PLAVIX) 75 MG tablet Take 1 tablet by mouth daily 90 tablet 1    Ascorbic Acid (VITAMIN C) 500 MG CAPS Take 500 mg by mouth daily 90 capsule 1    ferrous sulfate (IRON 325) 325 (65 Fe) MG tablet Take 1 tablet by mouth daily (with breakfast) 90 tablet 1    metFORMIN (GLUCOPHAGE-XR) 500 MG extended release tablet Take 1 tablet by mouth 3 times daily (with meals) 270 tablet 1    pantoprazole (PROTONIX) 40 MG tablet Take 1 tablet by mouth every morning (before breakfast) 90 tablet 1    aspirin 81 MG EC tablet Take 81 mg by mouth daily       Coenzyme Q10 (CO Q-10) 200 MG CAPS Take 200 mg by mouth nightly       Cyanocobalamin (VITAMIN B-12) 5000 MCG TBDP Take 5,000 mcg by mouth daily       latanoprost (XALATAN) 0.005 % ophthalmic solution Place 1 drop into the right eye nightly   4     No current facility-administered medications for this visit.          Earnest Desouza DO

## 2021-09-16 ENCOUNTER — CARE COORDINATION (OUTPATIENT)
Dept: CASE MANAGEMENT | Age: 81
End: 2021-09-16